# Patient Record
Sex: MALE | Race: OTHER | Employment: FULL TIME
[De-identification: names, ages, dates, MRNs, and addresses within clinical notes are randomized per-mention and may not be internally consistent; named-entity substitution may affect disease eponyms.]

---

## 2017-02-07 ENCOUNTER — OFFICE VISIT (OUTPATIENT)
Dept: FAMILY MEDICINE CLINIC | Facility: CLINIC | Age: 37
End: 2017-02-07

## 2017-02-07 ENCOUNTER — HOSPITAL ENCOUNTER (OUTPATIENT)
Age: 37
Discharge: HOME | End: 2017-02-07
Payer: COMMERCIAL

## 2017-02-07 VITALS
HEART RATE: 68 BPM | BODY MASS INDEX: 29.77 KG/M2 | SYSTOLIC BLOOD PRESSURE: 128 MMHG | HEIGHT: 69 IN | DIASTOLIC BLOOD PRESSURE: 66 MMHG | TEMPERATURE: 97.9 F | WEIGHT: 201 LBS

## 2017-02-07 DIAGNOSIS — Z00.00 ANNUAL PHYSICAL EXAM: Primary | ICD-10-CM

## 2017-02-07 DIAGNOSIS — Z00.00 ANNUAL PHYSICAL EXAM: ICD-10-CM

## 2017-02-07 LAB
CHOLESTEROL/HDL RATIO: 3.6
CHOLESTEROL: 197 MG/DL
GLUCOSE BLD-MCNC: 104 MG/DL (ref 70–99)
HDLC SERPL-MCNC: 54 MG/DL
LDL CHOLESTEROL: 120 MG/DL (ref 0–130)
TRIGL SERPL-MCNC: 115 MG/DL
VLDLC SERPL CALC-MCNC: NORMAL MG/DL (ref 1–30)

## 2017-02-07 PROCEDURE — 99395 PREV VISIT EST AGE 18-39: CPT | Performed by: FAMILY MEDICINE

## 2017-02-07 PROCEDURE — 82947 ASSAY GLUCOSE BLOOD QUANT: CPT

## 2017-02-07 PROCEDURE — 80061 LIPID PANEL: CPT

## 2017-02-07 PROCEDURE — 36415 COLL VENOUS BLD VENIPUNCTURE: CPT

## 2017-02-07 ASSESSMENT — PATIENT HEALTH QUESTIONNAIRE - PHQ9
1. LITTLE INTEREST OR PLEASURE IN DOING THINGS: 0
SUM OF ALL RESPONSES TO PHQ QUESTIONS 1-9: 0
SUM OF ALL RESPONSES TO PHQ9 QUESTIONS 1 & 2: 0
2. FEELING DOWN, DEPRESSED OR HOPELESS: 0

## 2017-02-07 ASSESSMENT — ENCOUNTER SYMPTOMS
CONSTIPATION: 1
NAUSEA: 0
SINUS PRESSURE: 0
SORE THROAT: 0
SHORTNESS OF BREATH: 0
VOMITING: 0
ABDOMINAL PAIN: 0

## 2017-10-20 ENCOUNTER — OFFICE VISIT (OUTPATIENT)
Dept: FAMILY MEDICINE CLINIC | Age: 37
End: 2017-10-20
Payer: COMMERCIAL

## 2017-10-20 VITALS
TEMPERATURE: 97.5 F | BODY MASS INDEX: 31.58 KG/M2 | HEIGHT: 68 IN | WEIGHT: 208.4 LBS | SYSTOLIC BLOOD PRESSURE: 124 MMHG | HEART RATE: 97 BPM | OXYGEN SATURATION: 97 % | DIASTOLIC BLOOD PRESSURE: 72 MMHG

## 2017-10-20 DIAGNOSIS — R20.0 NUMBNESS AND TINGLING IN LEFT HAND: ICD-10-CM

## 2017-10-20 DIAGNOSIS — M25.512 LEFT SHOULDER PAIN, UNSPECIFIED CHRONICITY: Primary | ICD-10-CM

## 2017-10-20 DIAGNOSIS — R20.2 NUMBNESS AND TINGLING IN LEFT HAND: ICD-10-CM

## 2017-10-20 PROCEDURE — 99213 OFFICE O/P EST LOW 20 MIN: CPT | Performed by: FAMILY MEDICINE

## 2017-10-20 ASSESSMENT — ENCOUNTER SYMPTOMS
SHORTNESS OF BREATH: 0
COUGH: 0
BACK PAIN: 0
NAUSEA: 0
ABDOMINAL PAIN: 0
DIARRHEA: 0
CONSTIPATION: 0
SORE THROAT: 0

## 2017-10-20 NOTE — PROGRESS NOTES
Subjective:      Patient ID: Maria Victoria Mart is a 44 y.o. male. Visit Information    Have you changed or started any medications since your last visit including any over-the-counter medicines, vitamins, or herbal medicines? no   Are you having any side effects from any of your medications? -  no  Have you stopped taking any of your medications? Is so, why? -  no    Have you seen any other physician or provider since your last visit? No   Have you had any other diagnostic tests since your last visit? Yes - Records Requested  Have you been seen in the emergency room and/or had an admission to a hospital since we last saw you? No  Have you had your routine dental cleaning in the past 6 months? no    Have you activated your [employer] account? If not, what are your barriers? Yes     Patient Care Team:  Barbara Alcocer MD as PCP - General (Family Medicine)    Medical History Review  Past Medical, Family, and Social History reviewed and does not contribute to the patient presenting condition    Health Maintenance   Topic Date Due    HIV screen  07/26/1994    DTaP/Tdap/Td vaccine (1 - Tdap) 07/26/1998    Flu vaccine (1) 09/01/2017     HPI   This 38-year -old male is seen in the Office. today complaining of left shoulder pain off and on   went to the urgent care and they gave him some pain medication he states that it is feeling better the pain comes and goes. He also complains of some numbness and tingling in both of his hand and mostly in his left hand he states he is a . At work it bothers him some time he has pain in his fingers has been using some patches on his shoulder and he states that the pain is much better  Review of Systems   Constitutional: Negative for appetite change. HENT: Negative for ear pain and sore throat. Eyes: Negative for visual disturbance. Respiratory: Negative for cough and shortness of breath. Cardiovascular: Negative for chest pain.    Gastrointestinal: Negative for abdominal pain, constipation, diarrhea and nausea. Genitourinary: Negative for frequency and urgency. Musculoskeletal: Positive for arthralgias. Negative for back pain. Neurological: Positive for numbness. Negative for dizziness and headaches. Objective:   Physical Exam   Constitutional: He is oriented to person, place, and time. He appears well-developed and well-nourished. /72   Pulse 97   Temp 97.5 °F (36.4 °C) (Oral)   Ht 5' 8\" (1.727 m)   Wt 208 lb 6.4 oz (94.5 kg)   SpO2 97%   BMI 31.69 kg/m²    HENT:   Head: Normocephalic. Mouth/Throat: Oropharynx is clear and moist.   Cardiovascular: Normal rate and regular rhythm. Pulmonary/Chest: Breath sounds normal. He has no rales. Abdominal: Soft. There is no tenderness. Musculoskeletal: He exhibits no edema or tenderness. No tenderness over the left shoulder present no restricted movement of the left arm   Lymphadenopathy:     He has no cervical adenopathy. Neurological: He is alert and oriented to person, place, and time. Tinel test is positive in left wrist   Nursing note and vitals reviewed. Assessment:      1. Left shoulder pain, unspecified chronicity  Analgesic    2. Numbness and tingling in left hand            Plan:        No orders of the defined types were placed in this encounter. No orders of the defined types were placed in this encounter. Return if symptoms worsen or fail to improve.     Discussed with the patient that if the pain gets worse in the shoulder he needs to come and see me if numbness in his hands get worse then we can run some tests    Wear a wrist splint on his left hand

## 2018-05-22 ENCOUNTER — OFFICE VISIT (OUTPATIENT)
Dept: FAMILY MEDICINE CLINIC | Age: 38
End: 2018-05-22
Payer: COMMERCIAL

## 2018-05-22 VITALS
OXYGEN SATURATION: 98 % | TEMPERATURE: 98 F | HEART RATE: 82 BPM | DIASTOLIC BLOOD PRESSURE: 80 MMHG | SYSTOLIC BLOOD PRESSURE: 120 MMHG | WEIGHT: 198 LBS | BODY MASS INDEX: 30.11 KG/M2

## 2018-05-22 DIAGNOSIS — K59.00 CONSTIPATION, UNSPECIFIED CONSTIPATION TYPE: Primary | ICD-10-CM

## 2018-05-22 PROCEDURE — 99213 OFFICE O/P EST LOW 20 MIN: CPT | Performed by: FAMILY MEDICINE

## 2018-05-22 RX ORDER — KETOCONAZOLE 20 MG/G
CREAM TOPICAL
Qty: 30 G | Refills: 1 | Status: CANCELLED | OUTPATIENT
Start: 2018-05-22

## 2018-05-22 RX ORDER — POLYETHYLENE GLYCOL 3350 17 G/17G
17 POWDER, FOR SOLUTION ORAL DAILY
Qty: 510 G | Refills: 0 | Status: SHIPPED | OUTPATIENT
Start: 2018-05-22 | End: 2019-01-21 | Stop reason: SDUPTHER

## 2018-05-22 RX ORDER — KETOCONAZOLE 20 MG/G
CREAM TOPICAL
COMMUNITY
Start: 2018-05-08 | End: 2018-05-22

## 2018-05-22 ASSESSMENT — ENCOUNTER SYMPTOMS
SHORTNESS OF BREATH: 0
NAUSEA: 0
ABDOMINAL PAIN: 0
VOMITING: 0
SORE THROAT: 0
CONSTIPATION: 1

## 2018-05-22 ASSESSMENT — PATIENT HEALTH QUESTIONNAIRE - PHQ9
2. FEELING DOWN, DEPRESSED OR HOPELESS: 0
SUM OF ALL RESPONSES TO PHQ9 QUESTIONS 1 & 2: 0
SUM OF ALL RESPONSES TO PHQ QUESTIONS 1-9: 0
1. LITTLE INTEREST OR PLEASURE IN DOING THINGS: 0

## 2018-07-16 ENCOUNTER — OFFICE VISIT (OUTPATIENT)
Dept: FAMILY MEDICINE CLINIC | Age: 38
End: 2018-07-16
Payer: COMMERCIAL

## 2018-07-16 VITALS
WEIGHT: 194.4 LBS | HEART RATE: 82 BPM | BODY MASS INDEX: 28.79 KG/M2 | TEMPERATURE: 97.9 F | DIASTOLIC BLOOD PRESSURE: 78 MMHG | HEIGHT: 69 IN | SYSTOLIC BLOOD PRESSURE: 134 MMHG | OXYGEN SATURATION: 96 %

## 2018-07-16 DIAGNOSIS — Z23 NEED FOR DIPHTHERIA-TETANUS-PERTUSSIS (TDAP) VACCINE, ADULT/ADOLESCENT: ICD-10-CM

## 2018-07-16 DIAGNOSIS — B49 FUNGAL INFECTION: Primary | ICD-10-CM

## 2018-07-16 DIAGNOSIS — Z00.00 PREVENTATIVE HEALTH CARE: ICD-10-CM

## 2018-07-16 DIAGNOSIS — R53.83 FATIGUE, UNSPECIFIED TYPE: ICD-10-CM

## 2018-07-16 DIAGNOSIS — Z83.3 FAMILY HISTORY OF DIABETES MELLITUS: ICD-10-CM

## 2018-07-16 DIAGNOSIS — K59.00 CONSTIPATION, UNSPECIFIED CONSTIPATION TYPE: ICD-10-CM

## 2018-07-16 PROCEDURE — 90471 IMMUNIZATION ADMIN: CPT | Performed by: NURSE PRACTITIONER

## 2018-07-16 PROCEDURE — 90715 TDAP VACCINE 7 YRS/> IM: CPT | Performed by: NURSE PRACTITIONER

## 2018-07-16 PROCEDURE — 99204 OFFICE O/P NEW MOD 45 MIN: CPT | Performed by: NURSE PRACTITIONER

## 2018-07-16 RX ORDER — NYSTATIN 100000 [USP'U]/G
POWDER TOPICAL
Qty: 1 BOTTLE | Refills: 5 | Status: SHIPPED | OUTPATIENT
Start: 2018-07-16 | End: 2021-02-08

## 2018-07-16 ASSESSMENT — ENCOUNTER SYMPTOMS
ABDOMINAL DISTENTION: 1
CONSTIPATION: 1
SHORTNESS OF BREATH: 0
ABDOMINAL PAIN: 0
COUGH: 0
EYE DISCHARGE: 0
BLOOD IN STOOL: 0

## 2018-07-16 NOTE — PROGRESS NOTES
Value Date     05/23/2007    K 3.6 05/23/2007     05/23/2007    CO2 26 05/23/2007    BUN 23 05/23/2007    CREATININE 1.1 05/23/2007    GLUCOSE 104 02/07/2017    CALCIUM 9.6 05/23/2007     No results found for: LABA1C        Assessment:       Diagnosis Orders   1. Fungal infection  nystatin (MYCOSTATIN) 636770 UNIT/GM powder   2. Constipation, unspecified constipation type     3. Fatigue, unspecified type  CBC    TSH with Reflex   4. Preventative health care  Comprehensive Metabolic Panel    Lipid Panel    HIV Screen   5. Need for diphtheria-tetanus-pertussis (Tdap) vaccine, adult/adolescent  Tdap (age 10y-63y) IM (Adacel)   10. Family history of diabetes mellitus  Hemoglobin A1C       Plan:       Diagnosis Orders   1. Fungal infection  nystatin (MYCOSTATIN) 687801 UNIT/GM powder   2. Constipation, unspecified constipation type     3. Fatigue, unspecified type  CBC    TSH with Reflex   4. Preventative health care  Comprehensive Metabolic Panel    Lipid Panel    HIV Screen   5. Need for diphtheria-tetanus-pertussis (Tdap) vaccine, adult/adolescent  Tdap (age 10y-63y) IM (Adacel)   10. Family history of diabetes mellitus  Hemoglobin A1C         Use cream ,use powder to absorb sweat, use mild soap on genitalia. Patient verbalizes understanding. Florencia Elizabeth received counseling on the following healthy behaviors: medication adherence  Reviewed prior labs and health maintenance  Continue current medications, diet and exercise. Discussed use, benefit, and side effects of prescribed medications. Barriers to medication compliance addressed. Patient given educational materials - see patient instructions  Was a self-tracking handout given in paper form or via [employer]? Yes    Requested Prescriptions     Signed Prescriptions Disp Refills    nystatin (MYCOSTATIN) 186645 UNIT/GM powder 1 Bottle 5     Sig: Apply 3 times daily. All patient questions answered. Patient voiced understanding.     Quality Measures    Body mass index is 28.71 kg/m². Elevated. Weight control planned discussed Healthy diet and regular exercise. BP: 134/78 Blood pressure is normal. Treatment plan consists of No treatment change needed. Lab Results   Component Value Date    LDLCHOLESTEROL 120 02/07/2017    (goal LDL reduction with dx if diabetes is 50% LDL reduction)      PHQ Scores 5/22/2018 2/7/2017 11/30/2015   PHQ2 Score 0 0 0   PHQ9 Score 0 0 0     Interpretation of Total Score Depression Severity: 1-4 = Minimal depression, 5-9 = Mild depression, 10-14 = Moderate depression, 15-19 = Moderately severe depression, 20-27 = Severe depression    Return in about 4 weeks (around 8/13/2018). Orders Placed This Encounter   Procedures    Tdap (age 10y-63y) IM (Adacel)    CBC     Standing Status:   Future     Standing Expiration Date:   7/16/2019    Comprehensive Metabolic Panel     Standing Status:   Future     Standing Expiration Date:   7/16/2019    Lipid Panel     Standing Status:   Future     Standing Expiration Date:   7/16/2019     Order Specific Question:   Is Patient Fasting?/# of Hours     Answer:   12    TSH with Reflex     Standing Status:   Future     Standing Expiration Date:   7/17/2019    Hemoglobin A1C     Standing Status:   Future     Standing Expiration Date:   7/16/2019    HIV Screen     Standing Status:   Future     Standing Expiration Date:   7/16/2019     Orders Placed This Encounter   Medications    nystatin (MYCOSTATIN) 365251 UNIT/GM powder     Sig: Apply 3 times daily. Dispense:  1 Bottle     Refill:  5     56.7 g       Patient given verbal and/or written educational instructions. Follow up as directed. I have reviewed and agree with the nursing documentation.     Electronically signed by PEPE Connolly CNP on 7/16/2018 at 9:43 AM

## 2018-07-21 ENCOUNTER — HOSPITAL ENCOUNTER (OUTPATIENT)
Age: 38
Discharge: HOME | End: 2018-07-21
Payer: COMMERCIAL

## 2018-07-21 DIAGNOSIS — Z83.3 FAMILY HISTORY OF DIABETES MELLITUS: ICD-10-CM

## 2018-07-21 DIAGNOSIS — R53.83 FATIGUE, UNSPECIFIED TYPE: ICD-10-CM

## 2018-07-21 DIAGNOSIS — Z00.00 PREVENTATIVE HEALTH CARE: ICD-10-CM

## 2018-07-21 LAB
ALBUMIN SERPL-MCNC: 4.4 G/DL (ref 3.5–5.2)
ALBUMIN/GLOBULIN RATIO: ABNORMAL (ref 1–2.5)
ALP BLD-CCNC: 67 U/L (ref 40–129)
ALT SERPL-CCNC: 18 U/L (ref 5–41)
ANION GAP SERPL CALCULATED.3IONS-SCNC: 12 MMOL/L (ref 9–17)
AST SERPL-CCNC: 18 U/L
BILIRUB SERPL-MCNC: 0.42 MG/DL (ref 0.3–1.2)
BUN BLDV-MCNC: 15 MG/DL (ref 6–20)
BUN/CREAT BLD: ABNORMAL (ref 9–20)
CALCIUM SERPL-MCNC: 9.1 MG/DL (ref 8.6–10.4)
CHLORIDE BLD-SCNC: 104 MMOL/L (ref 98–107)
CHOLESTEROL/HDL RATIO: 3.2
CHOLESTEROL: 165 MG/DL
CO2: 28 MMOL/L (ref 20–31)
CREAT SERPL-MCNC: 0.74 MG/DL (ref 0.7–1.2)
ESTIMATED AVERAGE GLUCOSE: 105 MG/DL
GFR AFRICAN AMERICAN: >60 ML/MIN
GFR NON-AFRICAN AMERICAN: >60 ML/MIN
GFR SERPL CREATININE-BSD FRML MDRD: ABNORMAL ML/MIN/{1.73_M2}
GFR SERPL CREATININE-BSD FRML MDRD: ABNORMAL ML/MIN/{1.73_M2}
GLUCOSE BLD-MCNC: 114 MG/DL (ref 70–99)
HBA1C MFR BLD: 5.3 % (ref 4–6)
HCT VFR BLD CALC: 44 % (ref 41–53)
HDLC SERPL-MCNC: 52 MG/DL
HEMOGLOBIN: 15.1 G/DL (ref 13.5–17.5)
HIV AG/AB: NONREACTIVE
LDL CHOLESTEROL: 103 MG/DL (ref 0–130)
MCH RBC QN AUTO: 30.8 PG (ref 26–34)
MCHC RBC AUTO-ENTMCNC: 34.4 G/DL (ref 31–37)
MCV RBC AUTO: 89.7 FL (ref 80–100)
NRBC AUTOMATED: NORMAL PER 100 WBC
PDW BLD-RTO: 13.3 % (ref 11.5–14.9)
PLATELET # BLD: 281 K/UL (ref 150–450)
PMV BLD AUTO: 8.6 FL (ref 6–12)
POTASSIUM SERPL-SCNC: 4.6 MMOL/L (ref 3.7–5.3)
RBC # BLD: 4.91 M/UL (ref 4.5–5.9)
SODIUM BLD-SCNC: 144 MMOL/L (ref 135–144)
TOTAL PROTEIN: 7.6 G/DL (ref 6.4–8.3)
TRIGL SERPL-MCNC: 48 MG/DL
TSH SERPL DL<=0.05 MIU/L-ACNC: 0.67 MIU/L (ref 0.3–5)
VLDLC SERPL CALC-MCNC: NORMAL MG/DL (ref 1–30)
WBC # BLD: 5.4 K/UL (ref 3.5–11)

## 2018-07-21 PROCEDURE — 84443 ASSAY THYROID STIM HORMONE: CPT

## 2018-07-21 PROCEDURE — 85027 COMPLETE CBC AUTOMATED: CPT

## 2018-07-21 PROCEDURE — 80061 LIPID PANEL: CPT

## 2018-07-21 PROCEDURE — 83036 HEMOGLOBIN GLYCOSYLATED A1C: CPT

## 2018-07-21 PROCEDURE — 36415 COLL VENOUS BLD VENIPUNCTURE: CPT

## 2018-07-21 PROCEDURE — 80053 COMPREHEN METABOLIC PANEL: CPT

## 2018-07-21 PROCEDURE — 87389 HIV-1 AG W/HIV-1&-2 AB AG IA: CPT

## 2018-07-31 ENCOUNTER — OFFICE VISIT (OUTPATIENT)
Dept: UROLOGY | Age: 38
End: 2018-07-31
Payer: COMMERCIAL

## 2018-07-31 VITALS
HEIGHT: 69 IN | HEART RATE: 65 BPM | BODY MASS INDEX: 28.88 KG/M2 | DIASTOLIC BLOOD PRESSURE: 88 MMHG | SYSTOLIC BLOOD PRESSURE: 131 MMHG | WEIGHT: 195 LBS | RESPIRATION RATE: 16 BRPM

## 2018-07-31 DIAGNOSIS — N48.1 BALANITIS: Primary | ICD-10-CM

## 2018-07-31 PROCEDURE — 99204 OFFICE O/P NEW MOD 45 MIN: CPT | Performed by: UROLOGY

## 2018-07-31 RX ORDER — CLOTRIMAZOLE AND BETAMETHASONE DIPROPIONATE 10; .64 MG/G; MG/G
CREAM TOPICAL
Qty: 45 G | Refills: 0 | Status: SHIPPED | OUTPATIENT
Start: 2018-07-31 | End: 2018-09-18 | Stop reason: SDUPTHER

## 2018-07-31 ASSESSMENT — ENCOUNTER SYMPTOMS
NAUSEA: 0
ABDOMINAL PAIN: 0
VOMITING: 0
COUGH: 0
COLOR CHANGE: 0
BACK PAIN: 1
WHEEZING: 0
EYE REDNESS: 0
EYE PAIN: 0
SHORTNESS OF BREATH: 0

## 2018-07-31 NOTE — PROGRESS NOTES
[facility]  [facility] - [place]  [address]  [address]  [address] [facility] [address]  Dept: [phone]  Dept Fax: [phone] [facility] Urology Office Note - New patient    Patient:  Neville Mcguire  YOB: 1980  Date: 7/31/2018    The patient is a 43 y.o. male who presents today for evaluation of the following problems:   Chief Complaint   Patient presents with    New Patient     fungal infection    referred by PEPE Gant CNP. HPI  Here for fungal infection he has noted on his penis. His PCP has given in Ketoconazole with some improvement. His foreskin is tight. Gets athletes feet. (Patient's old records have been requested, reviewed and summarized in today's note.)    Summary of old records: N/A    Additional History: N/A    Procedures Today: N/A    Last several PSA's:  No results found for: PSA  Last total testosterone:  No results found for: TESTOSTERONE  Urinalysis today:  No results found for this visit on 07/31/18. AUA Symptom Score (7/31/2018):   INCOMPLETE EMPTYING: How often have you had the sensation of not emptying your bladder?: Less than Half the time  FREQUENCY: How often do you have to urinate less than every two hours?: Not at all  INTERMITTENCY: How often have you found you stopped and started again several times when you urinated?: Not at all  URGENCY: How often have you found it difficult to postpone urination?: Not at all  WEAK STREAM: How often have you had a weak urinary stream?: Not at all  STRAINING: How often have you had to strain to start  urination?: Not at all  NOCTURIA: How many times did you typically get up at night to uriniate?: NONE  TOTAL I-PSS SCORE[de-identified] 2  How would you feel if you were to spend the rest of your life with your urinary condition?: Delighted    Last BUN and creatinine:  Lab Results   Component Value Date    BUN 15 07/21/2018     Lab Results   Component Value Date    CREATININE 0.74 07/21/2018 Additional Lab/Culture results:     Imaging Reviewed during this Office Visit:   (results were independently reviewed by physician and radiology report verified)    PAST MEDICAL, FAMILY AND SOCIAL HISTORY:  Past Medical History:   Diagnosis Date    GERD (gastroesophageal reflux disease)     Irritable bowel syndrome      Past Surgical History:   Procedure Laterality Date    COLONOSCOPY      UPPER GASTROINTESTINAL ENDOSCOPY  08/10/2009     Family History   Problem Relation Age of Onset    Hypertension Mother     Diabetes Father     Cancer Father         stomach death age 71     Outpatient Prescriptions Marked as Taking for the 7/31/18 encounter (Office Visit) with Mc Ingram MD   Medication Sig Dispense Refill    clotrimazole-betamethasone (LOTRISONE) 1-0.05 % cream Apply topically 2 times daily to clean dry skin. Stop cream once skin is les sinflamed. 45 g 0    nystatin (MYCOSTATIN) 459604 UNIT/GM powder Apply 3 times daily. 1 Bottle 5       Patient has no known allergies. History   Smoking Status    Never Smoker   Smokeless Tobacco    Never Used      (If patient a smoker, smoking cessation counseling offered)   History   Alcohol Use    0.0 oz/week     Comment: occasionally       REVIEW OF SYSTEMS:  Review of Systems    Physical Exam:    This a 43 y.o. male   Vitals:    07/31/18 1537   BP: 131/88   Pulse: 65   Resp: 16     Body mass index is 28.8 kg/m². Physical Exam  Constitutional: Patient in no acute distress. Neuro: Alert and oriented to person, place and time. Psych: Mood normal, affect normal  Skin: No rash noted  HEENT: Head: Normocephalic and atraumatic  Conjunctivae and EOM are normal. Pupils are equal, round  Nose: Normal  Right External Ear: Normal; Left External Ear: Normal  Mouth: Mucosa Moist  Neck: Supple  Lungs: Respiratory effort is normal  Cardiovascular: strong and regular. Abdomen: Soft, non-tender, non-distended  Bladder non-tender and not distended.   Musculoskeletal:

## 2018-08-13 ENCOUNTER — OFFICE VISIT (OUTPATIENT)
Dept: FAMILY MEDICINE CLINIC | Age: 38
End: 2018-08-13
Payer: COMMERCIAL

## 2018-08-13 ENCOUNTER — HOSPITAL ENCOUNTER (OUTPATIENT)
Age: 38
Discharge: HOME | End: 2018-08-13
Payer: COMMERCIAL

## 2018-08-13 VITALS
WEIGHT: 192.4 LBS | DIASTOLIC BLOOD PRESSURE: 82 MMHG | SYSTOLIC BLOOD PRESSURE: 116 MMHG | OXYGEN SATURATION: 97 % | BODY MASS INDEX: 27.54 KG/M2 | HEART RATE: 77 BPM | HEIGHT: 70 IN

## 2018-08-13 DIAGNOSIS — T14.8XXA BRUISING: ICD-10-CM

## 2018-08-13 DIAGNOSIS — R53.83 FATIGUE, UNSPECIFIED TYPE: ICD-10-CM

## 2018-08-13 DIAGNOSIS — L23.7 POISON IVY DERMATITIS: Primary | ICD-10-CM

## 2018-08-13 DIAGNOSIS — N48.1 BALANITIS: ICD-10-CM

## 2018-08-13 DIAGNOSIS — K59.00 CONSTIPATION, UNSPECIFIED CONSTIPATION TYPE: ICD-10-CM

## 2018-08-13 LAB — VITAMIN D 25-HYDROXY: 29.6 NG/ML (ref 30–100)

## 2018-08-13 PROCEDURE — 99214 OFFICE O/P EST MOD 30 MIN: CPT | Performed by: NURSE PRACTITIONER

## 2018-08-13 PROCEDURE — 36415 COLL VENOUS BLD VENIPUNCTURE: CPT

## 2018-08-13 PROCEDURE — 82306 VITAMIN D 25 HYDROXY: CPT

## 2018-08-13 RX ORDER — PREDNISONE 20 MG/1
20 TABLET ORAL 2 TIMES DAILY
Qty: 10 TABLET | Refills: 0 | Status: SHIPPED | OUTPATIENT
Start: 2018-08-13 | End: 2018-08-23

## 2018-08-13 ASSESSMENT — ENCOUNTER SYMPTOMS
SHORTNESS OF BREATH: 0
CONSTIPATION: 1
ABDOMINAL DISTENTION: 0

## 2018-08-13 NOTE — PROGRESS NOTES
[address] [facility]  [address]  [address] [facility] [address]  Dept: [phone]  Dept Fax: [phone]    Dong Pacheco is a 43 y.o. male who presents today for his medical conditions/complaints as noted below. Dong Pacheco is c/o of Nail Problem and Poison Ivy (still on hip)        HPI:     Patient presents with:  Saw URO for rash on penis, getting better. Poison Ivy: still on hip, remaining on hip, cleared on R elebow. occas constipation  Had colonoscopy   Was wnl         Past Medical History:   Diagnosis Date    GERD (gastroesophageal reflux disease)     Irritable bowel syndrome       Past Surgical History:   Procedure Laterality Date    COLONOSCOPY      UPPER GASTROINTESTINAL ENDOSCOPY  08/10/2009       Family History   Problem Relation Age of Onset    Hypertension Mother     Diabetes Father     Cancer Father         colon  death age 71       Social History   Substance Use Topics    Smoking status: Never Smoker    Smokeless tobacco: Never Used    Alcohol use 0.0 oz/week      Comment: occasionally      Current Outpatient Prescriptions   Medication Sig Dispense Refill    predniSONE (DELTASONE) 20 MG tablet Take 1 tablet by mouth 2 times daily for 10 days 10 tablet 0    clotrimazole-betamethasone (LOTRISONE) 1-0.05 % cream Apply topically 2 times daily to clean dry skin. Stop cream once skin is les sinflamed. 45 g 0    nystatin (MYCOSTATIN) 060638 UNIT/GM powder Apply 3 times daily. 1 Bottle 5     No current facility-administered medications for this visit. No Known Allergies      Subjective:      Review of Systems   Constitutional: Positive for fatigue. No night sweats, no wt loss    HENT: Negative for congestion. Respiratory: Negative for shortness of breath. Cardiovascular: Negative for chest pain. Gastrointestinal: Positive for constipation (occas ). Negative for abdominal distention.    Genitourinary: Negative for by PEPE Morillo CNP on 8/13/2018 at 4:20 PM

## 2018-08-14 LAB — PATHOLOGIST REVIEW: NORMAL

## 2018-09-18 ENCOUNTER — OFFICE VISIT (OUTPATIENT)
Dept: UROLOGY | Age: 38
End: 2018-09-18
Payer: COMMERCIAL

## 2018-09-18 VITALS
BODY MASS INDEX: 26.22 KG/M2 | HEIGHT: 69 IN | WEIGHT: 177 LBS | DIASTOLIC BLOOD PRESSURE: 74 MMHG | SYSTOLIC BLOOD PRESSURE: 120 MMHG | TEMPERATURE: 97.7 F | HEART RATE: 82 BPM

## 2018-09-18 DIAGNOSIS — N48.1 BALANITIS: Primary | ICD-10-CM

## 2018-09-18 PROCEDURE — 99213 OFFICE O/P EST LOW 20 MIN: CPT | Performed by: UROLOGY

## 2018-09-18 RX ORDER — CLOTRIMAZOLE AND BETAMETHASONE DIPROPIONATE 10; .64 MG/G; MG/G
CREAM TOPICAL
Qty: 45 G | Refills: 0 | Status: SHIPPED | OUTPATIENT
Start: 2018-09-18 | End: 2019-01-21 | Stop reason: SDUPTHER

## 2018-09-18 ASSESSMENT — ENCOUNTER SYMPTOMS
COUGH: 0
VOMITING: 0
BACK PAIN: 1
COLOR CHANGE: 0
SHORTNESS OF BREATH: 0
ABDOMINAL PAIN: 0
EYE REDNESS: 0
EYE PAIN: 0
WHEEZING: 0
NAUSEA: 0

## 2018-09-18 NOTE — PROGRESS NOTES
[facility]  [facility] [address]  [address]  [address] [facility] [address]  Dept: [phone] [facility] Urology Office Note - Established    Patient:  Jerome Borrero  YOB: 1980  Date: 9/18/2018    The patient is a 43 y.o. male who presents today for evaluation of the following problems:   Chief Complaint   Patient presents with    1 Month Follow-Up     7 week F/U for Balanitis       HPI  Doing better with ketoconoloze, improving, no pain, no urinary isues. Stream good,     Summary of old records: N/A    Additional History: N/A    Procedures Today: N/A    Urinalysis today:  No results found for this visit on 09/18/18. Last several PSA's:  No results found for: PSA  Last total testosterone:  No results found for: TESTOSTERONE    AUA Symptom Score (9/18/2018):   INCOMPLETE EMPTYING: How often have you had the sensation of not emptying your bladder?: Not at all  FREQUENCY: How often do you have to urinate less than every two hours?: Less than 1 to 5 times  INTERMITTENCY: How often have you found you stopped and started again several times when you urinated?: Not at all  URGENCY: How often have you found it difficult to postpone urination?: Not at all  WEAK STREAM: How often have you had a weak urinary stream?: Not at all  STRAINING: How often have you had to strain to start  urination?: Not at all  NOCTURIA: How many times did you typically get up at night to uriniate?: NONE  TOTAL I-PSS SCORE[de-identified] 1  How would you feel if you were to spend the rest of your life with your urinary condition?: Mostly Satisfied    Last BUN and creatinine:  Lab Results   Component Value Date    BUN 15 07/21/2018     Lab Results   Component Value Date    CREATININE 0.74 07/21/2018       Additional Lab/Culture results: none    Imaging Reviewed during this Office Visit: none  (results were independently reviewed by physician and radiology report verified)    PAST MEDICAL, [address] [place] [address] HISTORY UPDATE:  Past Medical History:   Diagnosis Date    GERD (gastroesophageal reflux disease)     Irritable bowel syndrome      Past Surgical History:   Procedure Laterality Date    COLONOSCOPY      UPPER GASTROINTESTINAL ENDOSCOPY  08/10/2009     Family History   Problem Relation Age of Onset    Hypertension Mother     Diabetes Father     Cancer Father         colon  death age 71     No outpatient prescriptions have been marked as taking for the 9/18/18 encounter (Office Visit) with Freddy Stephens MD.       Patient has no known allergies. History   Smoking Status    Never Smoker   Smokeless Tobacco    Never Used     (If patient a smoker, smoking cessation counseling offered)    History   Alcohol Use    0.0 oz/week     Comment: occasionally       REVIEW OF SYSTEMS:  Review of Systems    Physical Exam:      Vitals:    09/18/18 1605   BP: 120/74   Pulse: 82   Temp: 97.7 °F (36.5 °C)     Body mass index is 26.14 kg/m². Patient is a 43 y.o. male in no acute distress and alert and oriented to person, place and time. Physical Exam  Constitutional: Patient in no acute distress. Neuro: Alert and oriented to person, place and time. Psych: Mood normal, affect normal  Skin: No rash noted  HEENT: Head: Normocephalic and atraumatic  Conjunctivae and EOM are normal. Pupils are equal, round  Nose: Normal  Right External Ear: Normal; Left External Ear: Normal  Mouth: Mucosa Moist  Neck: Supple  Lungs: Respiratory effort is normal  Cardiovascular: Warm & Pink  Abdomen: Soft, non-tender, non-distended with no CVA,  No flank tenderness,  Or hepatosplenomegaly       Assessment and Plan      1. Balanitis           Plan:    cont cream for now  Consider circ in future  Return in about 3 months (around 12/18/2018) for Follow up. Prescriptions Ordered:  No orders of the defined types were placed in this encounter. Orders Placed:  No orders of the defined types were placed in this encounter.          Freddy Stephens MD    Agree with the ROS entered by the MA.

## 2018-09-18 NOTE — PROGRESS NOTES
Review of Systems   Constitutional: Negative for appetite change, chills and fever. Eyes: Negative for pain, redness and visual disturbance. Respiratory: Negative for cough, shortness of breath and wheezing. Cardiovascular: Negative for chest pain and leg swelling. Gastrointestinal: Negative for abdominal pain, nausea and vomiting. Genitourinary: Negative for difficulty urinating, discharge, dysuria, flank pain, frequency, hematuria, scrotal swelling and testicular pain. Musculoskeletal: Positive for back pain. Negative for joint swelling and myalgias. Skin: Negative for color change, rash and wound. Neurological: Negative for dizziness, tremors and numbness. Hematological: Negative for adenopathy. Does not bruise/bleed easily.

## 2018-12-18 ENCOUNTER — OFFICE VISIT (OUTPATIENT)
Dept: UROLOGY | Age: 38
End: 2018-12-18
Payer: COMMERCIAL

## 2018-12-18 VITALS
TEMPERATURE: 97.8 F | BODY MASS INDEX: 30.07 KG/M2 | SYSTOLIC BLOOD PRESSURE: 132 MMHG | HEART RATE: 78 BPM | HEIGHT: 69 IN | DIASTOLIC BLOOD PRESSURE: 97 MMHG | WEIGHT: 203 LBS

## 2018-12-18 DIAGNOSIS — N47.1 PHIMOSIS: ICD-10-CM

## 2018-12-18 DIAGNOSIS — N48.1 BALANITIS: Primary | ICD-10-CM

## 2018-12-18 PROCEDURE — 99213 OFFICE O/P EST LOW 20 MIN: CPT | Performed by: UROLOGY

## 2018-12-18 ASSESSMENT — ENCOUNTER SYMPTOMS
ABDOMINAL PAIN: 0
EYE PAIN: 0
BACK PAIN: 0
VOMITING: 0
NAUSEA: 0
COUGH: 0
SHORTNESS OF BREATH: 0
COLOR CHANGE: 0
EYE REDNESS: 0
WHEEZING: 0

## 2018-12-18 NOTE — PROGRESS NOTES
up.    Prescriptions Ordered:  No orders of the defined types were placed in this encounter. Orders Placed:  No orders of the defined types were placed in this encounter. Babita Dc MD    Agree with the ROS entered by the MA.

## 2019-01-21 ENCOUNTER — OFFICE VISIT (OUTPATIENT)
Dept: FAMILY MEDICINE CLINIC | Age: 39
End: 2019-01-21
Payer: COMMERCIAL

## 2019-01-21 VITALS
HEART RATE: 87 BPM | HEIGHT: 69 IN | SYSTOLIC BLOOD PRESSURE: 125 MMHG | OXYGEN SATURATION: 96 % | DIASTOLIC BLOOD PRESSURE: 77 MMHG | WEIGHT: 203.6 LBS | BODY MASS INDEX: 30.16 KG/M2

## 2019-01-21 DIAGNOSIS — E66.9 OBESITY, CLASS I, BMI 30-34.9: ICD-10-CM

## 2019-01-21 DIAGNOSIS — K59.00 CONSTIPATION, UNSPECIFIED CONSTIPATION TYPE: ICD-10-CM

## 2019-01-21 DIAGNOSIS — Z00.01 ENCOUNTER FOR GENERAL ADULT MEDICAL EXAMINATION WITH ABNORMAL FINDINGS: Primary | ICD-10-CM

## 2019-01-21 DIAGNOSIS — B37.2 YEAST DERMATITIS: ICD-10-CM

## 2019-01-21 DIAGNOSIS — E55.9 VITAMIN D DEFICIENCY: ICD-10-CM

## 2019-01-21 DIAGNOSIS — R53.83 FATIGUE, UNSPECIFIED TYPE: ICD-10-CM

## 2019-01-21 PROBLEM — E66.811 OBESITY, CLASS I, BMI 30-34.9: Status: ACTIVE | Noted: 2019-01-21

## 2019-01-21 PROCEDURE — 99395 PREV VISIT EST AGE 18-39: CPT | Performed by: NURSE PRACTITIONER

## 2019-01-21 RX ORDER — POLYETHYLENE GLYCOL 3350 17 G/17G
17 POWDER, FOR SOLUTION ORAL DAILY
Qty: 510 G | Refills: 5 | Status: SHIPPED | OUTPATIENT
Start: 2019-01-21 | End: 2020-02-06 | Stop reason: SDUPTHER

## 2019-01-21 RX ORDER — CLOTRIMAZOLE AND BETAMETHASONE DIPROPIONATE 10; .64 MG/G; MG/G
CREAM TOPICAL
Qty: 45 G | Refills: 0 | Status: SHIPPED | OUTPATIENT
Start: 2019-01-21 | End: 2021-02-08

## 2019-01-21 ASSESSMENT — ENCOUNTER SYMPTOMS
COUGH: 0
EYE DISCHARGE: 0
BLOOD IN STOOL: 0
ABDOMINAL PAIN: 0
ROS SKIN COMMENTS: GROIN RASH
CONSTIPATION: 1
SHORTNESS OF BREATH: 0

## 2020-02-06 ENCOUNTER — OFFICE VISIT (OUTPATIENT)
Dept: FAMILY MEDICINE CLINIC | Age: 40
End: 2020-02-06
Payer: COMMERCIAL

## 2020-02-06 VITALS
WEIGHT: 215.8 LBS | HEIGHT: 69 IN | BODY MASS INDEX: 31.96 KG/M2 | HEART RATE: 77 BPM | OXYGEN SATURATION: 97 % | SYSTOLIC BLOOD PRESSURE: 120 MMHG | DIASTOLIC BLOOD PRESSURE: 76 MMHG

## 2020-02-06 PROBLEM — R53.83 FATIGUE: Status: ACTIVE | Noted: 2020-02-06

## 2020-02-06 PROBLEM — F51.02 ADJUSTMENT INSOMNIA: Status: ACTIVE | Noted: 2020-02-06

## 2020-02-06 PROCEDURE — 99396 PREV VISIT EST AGE 40-64: CPT | Performed by: FAMILY MEDICINE

## 2020-02-06 PROCEDURE — 81003 URINALYSIS AUTO W/O SCOPE: CPT | Performed by: FAMILY MEDICINE

## 2020-02-06 RX ORDER — POLYETHYLENE GLYCOL 3350 17 G/17G
17 POWDER, FOR SOLUTION ORAL DAILY
Qty: 510 G | Refills: 5 | Status: SHIPPED | OUTPATIENT
Start: 2020-02-06 | End: 2020-03-07

## 2020-02-06 SDOH — ECONOMIC STABILITY: TRANSPORTATION INSECURITY
IN THE PAST 12 MONTHS, HAS LACK OF TRANSPORTATION KEPT YOU FROM MEETINGS, WORK, OR FROM GETTING THINGS NEEDED FOR DAILY LIVING?: NO

## 2020-02-06 SDOH — ECONOMIC STABILITY: TRANSPORTATION INSECURITY
IN THE PAST 12 MONTHS, HAS THE LACK OF TRANSPORTATION KEPT YOU FROM MEDICAL APPOINTMENTS OR FROM GETTING MEDICATIONS?: NO

## 2020-02-06 SDOH — ECONOMIC STABILITY: INCOME INSECURITY: HOW HARD IS IT FOR YOU TO PAY FOR THE VERY BASICS LIKE FOOD, HOUSING, MEDICAL CARE, AND HEATING?: NOT HARD AT ALL

## 2020-02-06 SDOH — ECONOMIC STABILITY: FOOD INSECURITY: WITHIN THE PAST 12 MONTHS, THE FOOD YOU BOUGHT JUST DIDN'T LAST AND YOU DIDN'T HAVE MONEY TO GET MORE.: SOMETIMES TRUE

## 2020-02-06 SDOH — ECONOMIC STABILITY: FOOD INSECURITY: WITHIN THE PAST 12 MONTHS, YOU WORRIED THAT YOUR FOOD WOULD RUN OUT BEFORE YOU GOT MONEY TO BUY MORE.: SOMETIMES TRUE

## 2020-02-06 ASSESSMENT — ENCOUNTER SYMPTOMS
COUGH: 0
DIARRHEA: 0
APNEA: 0
NAUSEA: 0
EYE PAIN: 0
CONSTIPATION: 1
TROUBLE SWALLOWING: 0
ABDOMINAL PAIN: 0
RESPIRATORY NEGATIVE: 1
COLOR CHANGE: 0
SHORTNESS OF BREATH: 0
SORE THROAT: 0
CHEST TIGHTNESS: 0
VOMITING: 0

## 2020-02-06 ASSESSMENT — PATIENT HEALTH QUESTIONNAIRE - PHQ9
SUM OF ALL RESPONSES TO PHQ9 QUESTIONS 1 & 2: 0
SUM OF ALL RESPONSES TO PHQ QUESTIONS 1-9: 0
2. FEELING DOWN, DEPRESSED OR HOPELESS: 0
1. LITTLE INTEREST OR PLEASURE IN DOING THINGS: 0
SUM OF ALL RESPONSES TO PHQ QUESTIONS 1-9: 0

## 2020-02-06 NOTE — PATIENT INSTRUCTIONS
when you lie down, start a worry book. Well before bedtime, write down your worries, and then set the book and your concerns aside. · Try meditation or other relaxation techniques before you go to bed. · If you cannot fall asleep, get up and go to another room until you feel sleepy. Do something relaxing. Repeat your bedtime routine before you go to bed again. · Make your house quiet and calm about an hour before bedtime. Turn down the lights, turn off the TV, log off the computer, and turn down the volume on music. This can help you relax after a busy day. When should you call for help? Watch closely for changes in your health, and be sure to contact your doctor if:    · Your efforts to improve your sleep do not work.     · Your insomnia gets worse.     · You have been feeling down, depressed, or hopeless or have lost interest in things that you usually enjoy. Where can you learn more? Go to https://[employer]ion[employer].Testive. org and sign in to your [employer] account. Enter P513 in the [employer] box to learn more about \"Insomnia: Care Instructions. \"     If you do not have an account, please click on the \"Sign Up Now\" link. Current as of: April 7, 2019  Content Version: 12.3  © 6341-5694 Healthwise, Incorporated. Care instructions adapted under license by [facility] ([facility]). If you have questions about a medical condition or this instruction, always ask your healthcare professional. [place] [address] any warranty or liability for your use of this information.

## 2020-02-06 NOTE — PROGRESS NOTES
Visit Information    Have you changed or started any medications since your last visit including any over-the-counter medicines, vitamins, or herbal medicines? no   Are you having any side effects from any of your medications? -  no  Have you stopped taking any of your medications? Is so, why? -  no    Have you seen any other physician or provider since your last visit? No  Have you had any other diagnostic tests since your last visit? No  Have you been seen in the emergency room and/or had an admission to a hospital since we last saw you? No  Have you had your routine dental cleaning in the past 6 months? no    Have you activated your [employer] account? If not, what are your barriers?  Yes     Patient Care Team:  PEPE Linares CNP as PCP - General (Family Medicine)  PEPE Delacruz CNP as PCP - [facility] Provider    Medical History Review  Past Medical, Family, and Social History reviewed and does contribute to the patient presenting condition    Health Maintenance   Topic Date Due    Diabetes screen  07/26/2019    Flu vaccine (1) 09/01/2019    Lipid screen  07/21/2023    DTaP/Tdap/Td vaccine (2 - Td) 07/16/2028    Shingles Vaccine (1 of 2) 07/26/2029    HIV screen  Completed    Hepatitis A vaccine  Aged Out    Hepatitis B vaccine  Aged Out    Hib vaccine  Aged Out    Meningococcal (ACWY) vaccine  Aged Out    Pneumococcal 0-64 years Vaccine  Aged Out

## 2020-02-06 NOTE — PROGRESS NOTES
[address] [address]  Dept: [phone]     Chief Complaint   Patient presents with    Annual Exam     Genesis Benavidez is a 35 y.o. male patient is here today for his annual physical examination. Also, Annual Exam   [employer] Orlando Kiser's chronic conditions includes:  Past Medical History:   Diagnosis Date    GERD (gastroesophageal reflux disease)     Irritable bowel syndrome       Cardiovascular screening:  BP screening: UTD  BP Readings from Last 3 Encounters:   02/06/20 120/76   01/21/19 125/77   12/18/18 (!) 132/97     Lipid screening -women 39 or older, increased risk of CHD:   The 10-year ASCVD risk score (Stefanie Moses, et al., 2013) is: 0.6%    Values used to calculate the score:      Age: 35 years      Sex: Male      Is Non- : No      Diabetic: No      Tobacco smoker: No      Systolic Blood Pressure: 591 mmHg      Is BP treated: No      HDL Cholesterol: 52 mg/dL      Total Cholesterol: 165 mg/dL    Diabetes mellitus screening:  Other: none  Lab Results   Component Value Date    LABA1C 5.3 07/21/2018     Lab Results   Component Value Date     07/21/2018        Depression screening:  The patient reports that domestic violence in his life is absent. Sleep pattern7-9 hours  PHQ-9 Total Score: 0 (2/6/2020  3:16 PM)       Nutritional assessment:  denies Healthy diet:  denies Fluctuation with weight. deniesRegular exercise:  not activenone  Body mass index is 31.87 kg/m². Wt Readings from Last 3 Encounters:   02/06/20 215 lb 12.8 oz (97.9 kg)   01/21/19 203 lb 9.6 oz (92.4 kg)   12/18/18 203 lb (92.1 kg)     Vision/Dentist/hearing  Last eye exam:last year   Visual Acuity Screening    Right eye Left eye Both eyes   Without correction: 20/13 20/13 20/10   With correction:        deniesHearing difficulties.   Last dental exam and preventative dental cleaning:yearly    No results found for: PAP Urinary symptoms:none  Denies prostate cancer history    Colonoscopy Screening:  Colonoscopy N/A  Recommended at 46 y/o  admits tofamily history of colon cancer- FATHER    Social history/Risk Screening:  Social History     Tobacco Use    Smoking status: Never Smoker    Smokeless tobacco: Never Used   Substance Use Topics    Alcohol use: Yes     Alcohol/week: 0.0 standard drinks     Comment: occasionally    Drug use: No      denies Use of recreational drugs none  deniesEver been transfused or tattooed  admits to Wears seatbelts:     Tobacco use screening:No Smoking History = 0  The patient smokes 0 packs per day.    Attempts to quit 0  Lung cancer screenin-78 yo, 30 pack-year currently smoking or have quit within the past 15 years:     Alcohol use:social drinker  no history of illicit drug use    Hepatitis C screening-adults at high risk and adults born between 80-46 No results found for: HAV, HEPAIGM, HEPBIGM, HEPBCAB, HBEAG, HEPCAB   HIV screening No results found for: [id number]     Immunization:  Tdap one time, then Td every 10 years: UTD  Influenza vaccination annually declined  Pneumococcal n/a  Varicella declined  Immunization History   Administered Date(s) Administered    Influenza A (G7V3-12) Vaccine Nasal 2009    Tdap (Boostrix, Adacel) 2018     Patient Active Problem List    Diagnosis Date Noted    Fatigue 2020    Adjustment insomnia 2020    Obesity, Class I, BMI 30-34.9 2019    Vitamin D deficiency 2019    Yeast dermatitis 2019    Family history of diabetes mellitus 2018    Constipation 2018     Past Surgical History:   Procedure Laterality Date    COLONOSCOPY      UPPER GASTROINTESTINAL ENDOSCOPY  08/10/2009     Family History   Problem Relation Age of Onset    Hypertension Mother     Diabetes Father     Cancer Father         colon  death age 71     Current Outpatient Medications   Medication Sig Dispense Refill    polyethylene glycol (MIRALAX) powder Take 17 g by mouth daily 510 g 5    melatonin ER 1 MG TBCR tablet Take 1 tablet by mouth nightly as needed (insomnia) 30 tablet 2    clotrimazole-betamethasone (LOTRISONE) 1-0.05 % cream Apply topically 2 times daily to clean dry skin. Stop cream once skin is less inflamed. 45 g 0    vitamin D (CHOLECALCIFEROL) 1000 UNIT TABS tablet Take 1 tablet by mouth daily 90 tablet 3    nystatin (MYCOSTATIN) 954730 UNIT/GM powder Apply 3 times daily. 1 Bottle 5     No current facility-administered medications for this visit. The patient's past medical, surgical, social, and family history as well as his current medications and allergies were reviewed as documented in today's encounter. Review of Systems   Constitutional: Positive for fatigue. Negative for chills and fever. HENT: Negative for congestion, ear pain, sore throat and trouble swallowing. Eyes: Negative for pain and visual disturbance. Respiratory: Negative. Negative for apnea, cough, chest tightness and shortness of breath. Cardiovascular: Negative. Gastrointestinal: Positive for constipation. Negative for abdominal pain, diarrhea, nausea and vomiting. Endocrine: Negative for cold intolerance and heat intolerance. Genitourinary: Negative for difficulty urinating, dysuria, frequency and genital sores. Musculoskeletal: Negative for arthralgias, gait problem and myalgias. Skin: Negative for color change and rash. Neurological: Negative for weakness, light-headedness and headaches. Psychiatric/Behavioral: Positive for sleep disturbance. Negative for agitation, behavioral problems and decreased concentration. The patient is nervous/anxious. /76   Pulse 77   Ht 5' 9\" (1.753 m)   Wt 215 lb 12.8 oz (97.9 kg)   SpO2 97%   BMI 31.87 kg/m²   Physical Exam  Vitals signs and nursing note reviewed. Constitutional:       Appearance: He is well-developed. He is obese.    HENT:      Head: Normocephalic. Right Ear: External ear normal.      Left Ear: External ear normal.      Nose: Nose normal.   Eyes:      General: No scleral icterus. Conjunctiva/sclera: Conjunctivae normal.      Pupils: Pupils are equal, round, and reactive to light. Neck:      Musculoskeletal: Normal range of motion and neck supple. Thyroid: No thyromegaly. Vascular: No JVD. Cardiovascular:      Rate and Rhythm: Normal rate and regular rhythm. Heart sounds: Normal heart sounds. No murmur. No friction rub. No gallop. Pulmonary:      Effort: Pulmonary effort is normal. No respiratory distress. Breath sounds: Normal breath sounds. No wheezing or rales. Abdominal:      General: Abdomen is protuberant. Bowel sounds are normal. There is no distension. Palpations: Abdomen is soft. Tenderness: There is no abdominal tenderness. There is no guarding or rebound. Hernia: No hernia is present. Musculoskeletal: Normal range of motion. General: No tenderness. Lymphadenopathy:      Cervical: No cervical adenopathy. Skin:     General: Skin is warm and dry. Capillary Refill: Capillary refill takes less than 2 seconds. Findings: No rash. Neurological:      Mental Status: He is alert and oriented to person, place, and time. Sensory: No sensory deficit. Coordination: Coordination normal.   Psychiatric:         Mood and Affect: Mood is anxious. Behavior: Behavior normal.         Thought Content:  Thought content normal.         Judgment: Judgment normal.         Lab Results   Component Value Date    WBC 5.4 07/21/2018    HGB 15.1 07/21/2018    HCT 44.0 07/21/2018    MCV 89.7 07/21/2018     07/21/2018     Lab Results   Component Value Date     07/21/2018    K 4.6 07/21/2018     07/21/2018    CO2 28 07/21/2018    BUN 15 07/21/2018    CREATININE 0.74 07/21/2018    GLUCOSE 114 07/21/2018    CALCIUM 9.1 07/21/2018      Lab Results   Component Value Date    ALT 18 07/21/2018    AST 18 07/21/2018    ALKPHOS 67 07/21/2018    BILITOT 0.42 07/21/2018     Lab Results   Component Value Date    TSH 0.67 07/21/2018     Lab Results   Component Value Date    CHOL 165 07/21/2018    CHOL 197 02/07/2017    CHOL 197 12/05/2015     Lab Results   Component Value Date    TRIG 48 07/21/2018    TRIG 115 02/07/2017    TRIG 89 12/05/2015     Lab Results   Component Value Date    HDL 52 07/21/2018    HDL 54 02/07/2017    HDL 48 12/05/2015     Lab Results   Component Value Date    LDLCHOLESTEROL 103 07/21/2018    LDLCHOLESTEROL 120 02/07/2017    LDLCHOLESTEROL 131 (H) 12/05/2015     Lab Results   Component Value Date    CHOLHDLRATIO 3.2 07/21/2018    CHOLHDLRATIO 3.6 02/07/2017    CHOLHDLRATIO 4.1 12/05/2015     Lab Results   Component Value Date    LABA1C 5.3 07/21/2018       Lab Results   Component Value Date    VITD25 29.6 (L) 08/13/2018     I personally reviewed testing with patient. Otherwise labs within normal limits  ASSESSMENT AND PLAN  1. Encounter for annual health examination  Annual  Get blood work done    - CBC Auto Differential; Future  - Comprehensive Metabolic Panel, Fasting; Future  - Hemoglobin A1C; Future  - Lipid, Fasting; Future  - Vitamin D 25 Hydroxy; Future  - Urinalysis Reflex to Culture; Future  - TSH without Reflex; Future    2. Constipation, unspecified constipation type  Ongoing  Drink plenty of fluids, enough so that your urine is light yellow or clear like water. Advised to Include high-fiber foods in your diet each day. These include fruits, vegetables, beans, and whole grains. Get at least 30 minutes of exercise on most days of the week. Take a fiber supplement, such as Citrucel (Methylcellulose fiber) or Metamucil (Psyllium), every day. Schedule time each day for a bowel movement. - polyethylene glycol (MIRALAX) powder; Take 17 g by mouth daily  Dispense: 510 g; Refill: 5    3.  Adjustment insomnia  Ongoing  Start Melatonin  Cut down intake of drinks with caffeine, such as coffee or black tea, for 8 hours before bed. Cut down on smoking or use other types of tobacco near bedtime. Cut down on Nicotine and alcohol   Advised to stop eating a big meal close to bedtime. Advised to stop drinking a lot of  fluids close to bedtime.    - melatonin ER 1 MG TBCR tablet; Take 1 tablet by mouth nightly as needed (insomnia)  Dispense: 30 tablet; Refill: 2    4. Vitamin D deficiency  Ongoing  Foods that contain a lot of vitamin D includes [place], tuna, and mackerel. Cheese, egg yolks, and beef liver have small amounts of vit D.  Milk, soy drinks, orange juice, yogurt, margarine, and some kinds of cereal have vitamin D added to them. Continue to use sunblock when out in the sun to prevent skin cancer.  - Vitamin D 25 Hydroxy; Future    5. Fatigue, unspecified type  Ongoing  Evaluate endocrine conditions  - TSH without Reflex; Future    6. Obesity, Class I, BMI 30-34.9  Ongoing  BMI increasing  Advised to eat a low-fat and low carbohydrates diet. Avoid fried foods especially fast food. Choose healthier options for snacks. Have 5-6 servings of fruits and vegetables per day. Cut down on eating processed food. Add 30 minutes to 1 hour aerobic exercise for 3-4 days a week. 7. Screening for prostate cancer  Recommended    - Psa screening; Future    8. Preventative health care  Initial visit  - CBC Auto Differential; Future  - Comprehensive Metabolic Panel, Fasting; Future  - Hemoglobin A1C; Future  - Lipid, Fasting; Future  - Vitamin D 25 Hydroxy; Future  - Urinalysis Reflex to Culture; Future  - TSH without Reflex; Future  - Psa screening; Future      Health Maintenance Due   Topic Date Due    Diabetes screen  07/26/2019     Health Maintenance reviewed   No influenza vaccine available.  Education Reviewed and Recommended: Weight Bearing Exercise, Low Fat, Low Cholesterol Diet   Healthful diet and Physical activity counseling to prevent CVD- via [place]? Yes    Requested Prescriptions     Signed Prescriptions Disp Refills    polyethylene glycol (MIRALAX) powder 510 g 5     Sig: Take 17 g by mouth daily    melatonin ER 1 MG TBCR tablet 30 tablet 2     Sig: Take 1 tablet by mouth nightly as needed (insomnia)       All patient questions answered. Patient voiced understanding. Quality Measures    Body mass index is 31.87 kg/m². Elevated. Weight control planned discussed Healthy diet and regular exercise. BP: 120/76 Blood pressure is normal. Treatment plan consists of No treatment change needed. Lab Results   Component Value Date    LDLCHOLESTEROL 103 07/21/2018    (goal LDL reduction with dx if diabetes is 50% LDL reduction)      PHQ Scores 2/6/2020 5/22/2018 2/7/2017 11/30/2015   PHQ2 Score 0 0 0 0   PHQ9 Score 0 0 0 0     Interpretation of Total Score Depression Severity: 1-4 = Minimal depression, 5-9 = Mild depression, 10-14 = Moderate depression, 15-19 = Moderately severe depression, 20-27 = Severe depression  The patient's past medical, surgical, social, andfamily history as well as his   current medications and allergies were reviewed as documented in today's encounter. Medications, labs, diagnostic studies, consultations and follow-up as documented in thisencounter. Return in about 1 year (around 2/6/2021) for annual exam, call for lab order, . Patient was seen with total face to face timeof 30 minutes. More than 50% of this visit was counseling and education. Future Appointments   Date Time Provider Ruddy Sim   2/8/2021  3:00 PM PEPE Patterson CNP [facility]     This note was completed by using the assistance of a speech-recognition program. However, inadvertent computerized transcription errors may be present. Although every effort was made to ensure accuracy, no guarantees can be provided that every mistake has been identified and corrected by editing.   Electronically signed by PEPE Grijalva CNP

## 2020-02-08 ENCOUNTER — HOSPITAL ENCOUNTER (OUTPATIENT)
Age: 40
Discharge: HOME | End: 2020-02-08
Payer: COMMERCIAL

## 2020-02-08 LAB
ABSOLUTE EOS #: 0.2 K/UL (ref 0–0.4)
ABSOLUTE IMMATURE GRANULOCYTE: ABNORMAL K/UL (ref 0–0.3)
ABSOLUTE LYMPH #: 1.7 K/UL (ref 1–4.8)
ABSOLUTE MONO #: 0.4 K/UL (ref 0.1–1.3)
ALBUMIN SERPL-MCNC: 4.5 G/DL (ref 3.5–5.2)
ALBUMIN/GLOBULIN RATIO: ABNORMAL (ref 1–2.5)
ALP BLD-CCNC: 71 U/L (ref 40–129)
ALT SERPL-CCNC: 26 U/L (ref 5–41)
ANION GAP SERPL CALCULATED.3IONS-SCNC: 11 MMOL/L (ref 9–17)
AST SERPL-CCNC: 21 U/L
BASOPHILS # BLD: 1 % (ref 0–2)
BASOPHILS ABSOLUTE: 0.1 K/UL (ref 0–0.2)
BILIRUB SERPL-MCNC: 0.4 MG/DL (ref 0.3–1.2)
BILIRUBIN URINE: NEGATIVE
BUN BLDV-MCNC: 14 MG/DL (ref 6–20)
BUN/CREAT BLD: ABNORMAL (ref 9–20)
CALCIUM SERPL-MCNC: 9.3 MG/DL (ref 8.6–10.4)
CHLORIDE BLD-SCNC: 104 MMOL/L (ref 98–107)
CHOLESTEROL, FASTING: 203 MG/DL
CHOLESTEROL/HDL RATIO: 4.1
CO2: 28 MMOL/L (ref 20–31)
COLOR: YELLOW
COMMENT UA: NORMAL
CREAT SERPL-MCNC: 0.72 MG/DL (ref 0.7–1.2)
DIFFERENTIAL TYPE: ABNORMAL
EOSINOPHILS RELATIVE PERCENT: 3 % (ref 0–4)
GFR AFRICAN AMERICAN: >60 ML/MIN
GFR NON-AFRICAN AMERICAN: >60 ML/MIN
GFR SERPL CREATININE-BSD FRML MDRD: ABNORMAL ML/MIN/{1.73_M2}
GFR SERPL CREATININE-BSD FRML MDRD: ABNORMAL ML/MIN/{1.73_M2}
GLUCOSE FASTING: 114 MG/DL (ref 70–99)
GLUCOSE URINE: NEGATIVE
HCT VFR BLD CALC: 47.1 % (ref 41–53)
HDLC SERPL-MCNC: 50 MG/DL
HEMOGLOBIN: 15.9 G/DL (ref 13.5–17.5)
IMMATURE GRANULOCYTES: ABNORMAL %
KETONES, URINE: NEGATIVE
LDL CHOLESTEROL: 129 MG/DL (ref 0–130)
LEUKOCYTE ESTERASE, URINE: NEGATIVE
LYMPHOCYTES # BLD: 31 % (ref 24–44)
MCH RBC QN AUTO: 30.6 PG (ref 26–34)
MCHC RBC AUTO-ENTMCNC: 33.8 G/DL (ref 31–37)
MCV RBC AUTO: 90.4 FL (ref 80–100)
MONOCYTES # BLD: 8 % (ref 1–7)
NITRITE, URINE: NEGATIVE
NRBC AUTOMATED: ABNORMAL PER 100 WBC
PDW BLD-RTO: 13.2 % (ref 11.5–14.9)
PH UA: 7.5 (ref 5–8)
PLATELET # BLD: 298 K/UL (ref 150–450)
PLATELET ESTIMATE: ABNORMAL
PMV BLD AUTO: 8.3 FL (ref 6–12)
POTASSIUM SERPL-SCNC: 4.3 MMOL/L (ref 3.7–5.3)
PROSTATE SPECIFIC ANTIGEN: 1.25 UG/L
PROTEIN UA: NEGATIVE
RBC # BLD: 5.21 M/UL (ref 4.5–5.9)
RBC # BLD: ABNORMAL 10*6/UL
SEG NEUTROPHILS: 57 % (ref 36–66)
SEGMENTED NEUTROPHILS ABSOLUTE COUNT: 3.1 K/UL (ref 1.3–9.1)
SODIUM BLD-SCNC: 143 MMOL/L (ref 135–144)
SPECIFIC GRAVITY UA: 1.02 (ref 1–1.03)
TOTAL PROTEIN: 7.7 G/DL (ref 6.4–8.3)
TRIGLYCERIDE, FASTING: 120 MG/DL
TSH SERPL DL<=0.05 MIU/L-ACNC: 0.65 MIU/L (ref 0.3–5)
TURBIDITY: CLEAR
URINE HGB: NEGATIVE
UROBILINOGEN, URINE: NORMAL
VITAMIN D 25-HYDROXY: 24.7 NG/ML (ref 30–100)
VLDLC SERPL CALC-MCNC: ABNORMAL MG/DL (ref 1–30)
WBC # BLD: 5.5 K/UL (ref 3.5–11)
WBC # BLD: ABNORMAL 10*3/UL

## 2020-02-08 PROCEDURE — 80053 COMPREHEN METABOLIC PANEL: CPT

## 2020-02-08 PROCEDURE — 85025 COMPLETE CBC W/AUTO DIFF WBC: CPT

## 2020-02-08 PROCEDURE — 83036 HEMOGLOBIN GLYCOSYLATED A1C: CPT

## 2020-02-08 PROCEDURE — 82306 VITAMIN D 25 HYDROXY: CPT

## 2020-02-08 PROCEDURE — G0103 PSA SCREENING: HCPCS

## 2020-02-08 PROCEDURE — 36415 COLL VENOUS BLD VENIPUNCTURE: CPT

## 2020-02-08 PROCEDURE — 80061 LIPID PANEL: CPT

## 2020-02-08 PROCEDURE — 81003 URINALYSIS AUTO W/O SCOPE: CPT

## 2020-02-08 PROCEDURE — 84443 ASSAY THYROID STIM HORMONE: CPT

## 2020-02-09 LAB
ESTIMATED AVERAGE GLUCOSE: 105 MG/DL
HBA1C MFR BLD: 5.3 % (ref 4–6)

## 2020-04-15 RX ORDER — MELATONIN
Qty: 90 TABLET | Refills: 3 | Status: SHIPPED | OUTPATIENT
Start: 2020-04-15

## 2020-12-28 ENCOUNTER — HOSPITAL ENCOUNTER (EMERGENCY)
Age: 40
Discharge: HOME | End: 2020-12-28
Attending: EMERGENCY MEDICINE
Payer: COMMERCIAL

## 2020-12-28 ENCOUNTER — APPOINTMENT (OUTPATIENT)
Dept: GENERAL RADIOLOGY | Age: 40
End: 2020-12-28
Payer: COMMERCIAL

## 2020-12-28 VITALS
WEIGHT: 210 LBS | BODY MASS INDEX: 31.01 KG/M2 | RESPIRATION RATE: 16 BRPM | OXYGEN SATURATION: 99 % | TEMPERATURE: 98.4 F | DIASTOLIC BLOOD PRESSURE: 84 MMHG | SYSTOLIC BLOOD PRESSURE: 135 MMHG | HEART RATE: 75 BPM

## 2020-12-28 PROCEDURE — 6370000000 HC RX 637 (ALT 250 FOR IP): Performed by: PHYSICIAN ASSISTANT

## 2020-12-28 PROCEDURE — 90715 TDAP VACCINE 7 YRS/> IM: CPT | Performed by: PHYSICIAN ASSISTANT

## 2020-12-28 PROCEDURE — 12001 RPR S/N/AX/GEN/TRNK 2.5CM/<: CPT

## 2020-12-28 PROCEDURE — 6360000002 HC RX W HCPCS: Performed by: PHYSICIAN ASSISTANT

## 2020-12-28 PROCEDURE — 90471 IMMUNIZATION ADMIN: CPT | Performed by: PHYSICIAN ASSISTANT

## 2020-12-28 PROCEDURE — 2500000003 HC RX 250 WO HCPCS: Performed by: PHYSICIAN ASSISTANT

## 2020-12-28 PROCEDURE — 99283 EMERGENCY DEPT VISIT LOW MDM: CPT

## 2020-12-28 PROCEDURE — 73140 X-RAY EXAM OF FINGER(S): CPT

## 2020-12-28 RX ORDER — CEPHALEXIN 500 MG/1
500 CAPSULE ORAL 4 TIMES DAILY
Qty: 28 CAPSULE | Refills: 0 | Status: SHIPPED | OUTPATIENT
Start: 2020-12-28 | End: 2021-01-04

## 2020-12-28 RX ORDER — HYDROCODONE BITARTRATE AND ACETAMINOPHEN 5; 325 MG/1; MG/1
1 TABLET ORAL ONCE
Status: COMPLETED | OUTPATIENT
Start: 2020-12-28 | End: 2020-12-28

## 2020-12-28 RX ORDER — LIDOCAINE HYDROCHLORIDE 10 MG/ML
20 INJECTION, SOLUTION INFILTRATION; PERINEURAL ONCE
Status: COMPLETED | OUTPATIENT
Start: 2020-12-28 | End: 2020-12-28

## 2020-12-28 RX ADMIN — TETANUS TOXOID, REDUCED DIPHTHERIA TOXOID AND ACELLULAR PERTUSSIS VACCINE, ADSORBED 0.5 ML: 5; 2.5; 8; 8; 2.5 SUSPENSION INTRAMUSCULAR at 13:34

## 2020-12-28 RX ADMIN — LIDOCAINE HYDROCHLORIDE 20 ML: 10 INJECTION, SOLUTION INFILTRATION; PERINEURAL at 14:51

## 2020-12-28 RX ADMIN — HYDROCODONE BITARTRATE AND ACETAMINOPHEN 1 TABLET: 5; 325 TABLET ORAL at 13:34

## 2020-12-28 ASSESSMENT — PAIN SCALES - GENERAL
PAINLEVEL_OUTOF10: 5

## 2020-12-28 NOTE — ED NOTES
Pt comes to this ER with c/o lt thumb laceration. Pt states he cut his thumb on a saw blade around 11:00 today. Bleeding is controlled by a dressing. Pt arrives A+O x 4, GCS = 15, PMS x 4 intact, eupneic, and PWD.      Ashley Cooley RN  12/28/20 [id number]

## 2020-12-28 NOTE — ED PROVIDER NOTES
eMERGENCY dEPARTMENT eNCOUnter   Independent Attestation     Pt Name: Gloria Ramirez  MRN: [id number]  [place] 1980  Date of evaluation: 12/28/20     Gloria Ramirez is a 38 y.o. male with CC: Laceration (Lt thumb laceration)      Based on the medical record the care appears appropriate. I was personally available for consultation in the Emergency Department.     Dacia Cummings DO  Attending Emergency Physician                  Dacia Cummings DO  12/28/20 0446

## 2020-12-28 NOTE — ED PROVIDER NOTES
[address]  eMERGENCY dEPARTMENT eNCOUnter      Pt Name: Raul Lombardi  MRN: [id number]  [place] 1980  Date of evaluation: 12/28/2020  Provider: Karena Rodriguez PA-C    CHIEF COMPLAINT       Chief Complaint   Patient presents with    Laceration     Lt thumb laceration           HISTORY OF PRESENT ILLNESS  (Location/Symptom, Timing/Onset, Context/Setting, Quality, Duration, Modifying Factors, Severity.)   Raul Lombardi is a 38 y.o. male who presents to the emergency department c/o laceration to left thumb. States they cut left thumb on a saw blade today around 11am. Denies any numbness or tingling. Denies any other complaints. Pt is right handed. Up to date on tetanus: no       Nursing Notes were reviewed. REVIEW OF SYSTEMS    (2-9 systems for level 4, 10 or more for level 5)     Review of Systems   Laceration to left thumb     Except as noted above the remainder of the review of systems was reviewed and negative. PAST MEDICAL HISTORY     Past Medical History:   Diagnosis Date    GERD (gastroesophageal reflux disease)     Irritable bowel syndrome      None otherwise stated in nurses notes    SURGICAL HISTORY       Past Surgical History:   Procedure Laterality Date    COLONOSCOPY      UPPER GASTROINTESTINAL ENDOSCOPY  08/10/2009     None otherwise stated in nurses notes    CURRENT MEDICATIONS       Discharge Medication List as of 12/28/2020  2:53 PM      CONTINUE these medications which have NOT CHANGED    Details   Cholecalciferol (VITAMIN D3) 25 MCG (1000 UT) TABS TAKE 1 TABLET BY MOUTH DAILY, Disp-90 tablet, R-3Normal      melatonin ER 1 MG TBCR tablet Take 1 tablet by mouth nightly as needed (insomnia), Disp-30 tablet, R-2Normal      clotrimazole-betamethasone (LOTRISONE) 1-0.05 % cream Apply topically 2 times daily to clean dry skin. Stop cream once skin is less inflamed. , Disp-45 g, R-0, Normal      nystatin (MYCOSTATIN) 200734 UNIT/GM powder Apply 3 times daily. , Disp-1 Bottle, R-5, Normal             ALLERGIES     Patient has no known allergies. FAMILY HISTORY           Problem Relation Age of Onset    Hypertension Mother     Diabetes Father     Cancer Father         colon  death age 71     Family Status   Relation Name Status    Mother  Alive    Father        None otherwise stated in nurses notes    SOCIAL HISTORY      reports that he has never smoked. He has never used smokeless tobacco. He reports current alcohol use. He reports that he does not use drugs. lives at home with others     PHYSICAL EXAM    (up to 7 for level 4, 8 or more for level 5)     ED Triage Vitals [20 1254]   BP Temp Temp Source Pulse Resp SpO2 Height Weight   135/84 98.4 °F (36.9 °C) Oral 75 16 99 % -- 210 lb (95.3 kg)       Physical Exam   Constitutional: well-developed, well-nourished, nontoxic, well appearing, not distressed  HEENT:  normocephalic atraumatic, external ears normal appearance, no nasal deformity, no neck masses or edema, patient protecting airway, no stridor, phonating well  Eyes: pupils equal, sclera non-icteric, no discharge  Cardiovascular: no JVD  Respiratory: non-labored breathing, effort normal, no accessory muscle use visulized, no audible wheezing  Gastrointestinal: Abdomen not distended  Musculoskeletal: Examination of left thumb reveals FROM. 5/5 strength. Brisk cap refill. Distal sensation intact. 2/2 radial pulse. Skin: examination of left thumb reveals laceration through nail. There is a 0.5cm jagged laceration through pad of left thumb. Bleeding controlled. No visible foreign bodies.    Neuro: alert and oriented times 3, GCS 15, normal coordination, no dysarthria or aphasia  Psych: normal mood and affect, cooperative, normal thought content              DIAGNOSTIC RESULTS         RADIOLOGY:   All plain film, CT, MRI, and formal ultrasound images (except ED bedside ultrasound) are read by the radiologist, see reports below, unless otherwise noted in MDM or here. No results found. LABS:  Labs Reviewed - No data to display    All other labs were within normal range or not returned as of this dictation. EMERGENCY DEPARTMENT COURSE and DIFFERENTIAL DIAGNOSIS/MDM:   Vitals:    Vitals:    12/28/20 1254   BP: 135/84   Pulse: 75   Resp: 16   Temp: 98.4 °F (36.9 °C)   TempSrc: Oral   SpO2: 99%   Weight: 210 lb (95.3 kg)           PROCEDURES:  Laceration Repair Procedure Note    Indication: Laceration    Procedure: The patient was placed in the appropriate position and anesthesia around the laceration was obtained by infiltration using 1% Lidocaine without epinephrine. The area was then irrigated with normal saline. The laceration was closed with 5-0 Ethilon using interrupted sutures. There were no additional lacerations requiring repair. The wound area was then dressed with gauze. Total repaired wound length: 1 cm. Other Items: Suture count: 3    The patient tolerated the procedure well. Complications: None        Laceration through left thumb nail. Small jagged laceration over pad of thumb. There are no visible foreign bodies. Xray shows no fracture. Jagged laceration repaired with 3 sutures. Will not remove thumb nail. Will start on keflex. boosterix updated. Referred to dr. Elvie Collet. Wound care instructions given. Pt instructed to have sutures removed in 7-10 days by pcp. In unable to f/u, return for suture removal. Pt agrees. Instructed to return for signs of infection including redness, swelling, fevers chills, increased pain, red streaking, pus drainage.     ED MEDS:  Orders Placed This Encounter   Medications    HYDROcodone-acetaminophen (NORCO) 5-325 MG per tablet 1 tablet    Tetanus-Diphth-Acell Pertussis (BOOSTRIX) injection 0.5 mL    lidocaine 1 % injection 20 mL    cephALEXin (KEFLEX) 500 MG capsule     Sig: Take 1 capsule by mouth 4 times daily for 7 days     Dispense:  28 capsule     Refill:  0 CONSULTS:  None          FINAL IMPRESSION      1.  Laceration of left thumb without foreign body with damage to nail, initial encounter          DISPOSITION/PLAN   DISPOSITION Decision To Discharge    PATIENT REFERRED TO:  Alyssa Moulton, APRN - CNP  [address]  [facility] [address] [facility]  [facility] [address]  [address] [facility] [address], [address] [address] [address] [facility] [address] [phone]            DISCHARGE MEDICATIONS:  Discharge Medication List as of 12/28/2020  2:53 PM      START taking these medications    Details   cephALEXin (KEFLEX) 500 MG capsule Take 1 capsule by mouth 4 times daily for 7 days, Disp-28 capsule, R-0Normal             (Please note that portions of this note were completed with a voice recognition program.  Efforts were made to edit the dictations but occasionally words are mis-transcribed.)    Tavia [address], PA-C  12/28/20 [id number]

## 2020-12-30 ENCOUNTER — TELEPHONE (OUTPATIENT)
Dept: FAMILY MEDICINE CLINIC | Age: 40
End: 2020-12-30

## 2020-12-30 NOTE — TELEPHONE ENCOUNTER
[facility]) ED Follow up Call    Reason for ED visit:  [address] [facility] , this is Linettepetar Correa from Dr. Kate Daniel's office, just calling to see how you are doing after your recent ED visit. Did you receive discharge instructions? Yes  Do you understand the discharge instructions? Yes  Did the ED give you any new prescriptions? Yes  Were you able to fill your prescriptions? Yes      Do you have one of our red, yellow and green  Zone sheets that help you to determine when you should go to the ED? Not Applicable      Do you need or want to make a follow up appt with your PCP? Yes    Do you have any further needs in the home i.e. Equipment?   Not Applicable        FU appts/Provider:    Future Appointments   Date Time Provider Ruddy Sim   2/8/2021  3:00 PM Barry Daniel, PEPE - CNP [facility]

## 2021-02-05 NOTE — PROGRESS NOTES
[address]  [address] [facility] [address]  Dept: [phone]     Chief Complaint   Patient presents with    Annual Exam      Here for annual exam. Also, Annual Exam    SHIFT WORK SLEEP DISORDER/INSOMNIA  Patient works ar [employer] and does shiftwork. He c/o some hypersomnia and insomnia. He was given some melatonin with mild success. Especially when shifts changes drastically. CONSTIPATION  Patient still c/o constipation. He tries some metamucil with no success. He c/o increase gassiness/bloatingness. Patient had colonoscopy when he was 26 due to constipation. No polyps, diverticulosis. DYSLIPIDEMIA  Ross Dunn has a known history of dyslipidemia. Patient is on healthy lifestyl to help improve dyslipidemia. Patient denies adverse reaction to this therapy. The patient is not known to have coexisting coronary artery disease. ASCVD Score below. The 10-year CVD risk score (JOJO'Sheldon, et al., 2008) is: 3.9%    Values used to calculate the score:      Age: 38 years      Sex: Male      Diabetic: No      Tobacco smoker: No      Systolic Blood Pressure: 673 mmHg      Is BP treated: No      HDL Cholesterol: 50 mg/dL      Total Cholesterol: 203 mg/dL  Lab Results   Component Value Date/Time    CHOLFAST 203 (H) 02/08/2020 08:23 AM    CHOL 165 07/21/2018 07:47 AM    HDL 50 02/08/2020 08:23 AM    LDLCHOLESTEROL 129 02/08/2020 08:23 AM    TRIGLYCFAST 120 02/08/2020 08:23 AM    CHOLHDLRATIO 4.1 02/08/2020 08:23 AM    VLDL NOT REPORTED 02/08/2020 08:23 AM     VITAMIN D  Patient has a known Vitamin D Deficiency. he had a course of supplementation for 12 weeks. he is due to get levels check. We continue to discuss ways to manage this condition. We also discussed ways to improve the levels. Such as learning food groups that are high in Vitamin D.  We also discuss that sun exposure is beneficial however, too much sun and sun damage can potentially result 21 223 lb (101.2 kg)   20 210 lb (95.3 kg)   20 215 lb 12.8 oz (97.9 kg)     Healthful diet and Physical activity counseling to prevent CVD- low carb, low fat diet, increase fruits and vegetables, and exercise 4-5 times a day 30-40 minutes a day discussed  Tobacco use screening: never smoked  Lung cancer screenin-78 yo, 30 pack-year currently smoking or have quit within the past 15 years:   Alcohol use:social drinker  Immunization History   Administered Date(s) Administered    Influenza A ([id number]) Vaccine Nasal 2009    Tdap (Boostrix, Adacel) 2018, 2020     Tdap onetime, then Td every 10 years  Influenza annually  PPSV 23 in all adults 19-63 yo with chronic conditions, smokers, alcoholism,  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 Colonoscopy recommended at 47  The patient has yes family history of colon cancer  The patient has yesfamily history of cancer.   BP screening  BP Readings from Last 3 Encounters:   21 118/82   20 135/84   20 120/76     Pulse Readings from Last 3 Encounters:   21 82   20 75   20 77     Diabetes mellitus type 2 screening if sustained /80  Lab Results   Component Value Date    LABA1C 5.3 2020     Lipid screening   Lab Results   Component Value Date    CHOL 165 2018    CHOL 197 2017    CHOL 197 2015     Lab Results   Component Value Date    TRIG 48 2018    TRIG 115 2017    TRIG 89 2015     Lab Results   Component Value Date    HDL 50 2020    HDL 52 2018    HDL 54 2017     Lab Results   Component Value Date    LDLCHOLESTEROL 129 2020    LDLCHOLESTEROL 103 2018    LDLCHOLESTEROL 120 2017     Lab Results   Component Value Date    CHOLHDLRATIO 4.1 2020    CHOLHDLRATIO 3.2 2018    CHOLHDLRATIO 3.6 2017     The 10-year ASCVD risk score (Linnell Jeans., et al., 2013) is: 1.1%    Values used to calculate the score: Age: 38 years      Sex: Male      Is Non- : No      Diabetic: No      Tobacco smoker: No      Systolic Blood Pressure: 222 mmHg      Is BP treated: No      HDL Cholesterol: 50 mg/dL      Total Cholesterol: 203 mg/dL  Hepatitis C screening-adults at high risk and adults born between 0219-8505  No results found for: HAV, HEPAIGM, HEPBIGM, HEPBCAB, HBEAG, HEPCAB  Fall screening-any fall in the past 1 yearNot at risk for falls. PHQ Scores 2/8/2021 2/6/2020 5/22/2018 2/7/2017 11/30/2015   PHQ2 Score 1 0 0 0 0   PHQ9 Score 1 0 0 0 0     Interpretation of Total Score Depression Severity: 1-4 = Minimal depression, 5-9 = Mild depression, 10-14 = Moderate depression, 15-19 = Moderately severedepression, 20-27 = Severe depression  Health Maintenance reviewed - annual labs. Health Maintenance Due   Topic Date Due    Hepatitis C screen  1980     Patient Active Problem List    Diagnosis Date Noted    Shift work sleep disorder 02/08/2021    Fatigue 02/06/2020    Adjustment insomnia 02/06/2020    Obesity, Class I, BMI 30-34.9 01/21/2019    Vitamin D deficiency 01/21/2019    Yeast dermatitis 01/21/2019    Family history of diabetes mellitus 07/16/2018    Constipation 07/16/2018     Past Medical History:   Diagnosis Date    GERD (gastroesophageal reflux disease)     Irritable bowel syndrome      Past Surgical History:   Procedure Laterality Date    COLONOSCOPY      UPPER GASTROINTESTINAL ENDOSCOPY  08/10/2009     Family History   Problem Relation Age of Onset    Hypertension Mother     Diabetes Father     Cancer Father         colon  death age 71     Social History     Tobacco Use    Smoking status: Never Smoker    Smokeless tobacco: Never Used   Substance Use Topics    Alcohol use:  Yes     Alcohol/week: 0.0 standard drinks     Comment: occasionally    Drug use: No     Current Outpatient Medications   Medication Sig Dispense Refill    Wheat Dextrin (BENEFIBER ON THE GO) POWD Take 1 Package by mouth daily Mix with 8 ounce drink daily. 100 each 3    modafinil (PROVIGIL) 100 MG tablet Take 1 tablet by mouth daily for 30 days. 30 tablet 0    melatonin ER 1 MG TBCR tablet Take 1 tablet by mouth nightly as needed (insomnia) 30 tablet 2    Cholecalciferol (VITAMIN D3) 25 MCG (1000 UT) TABS TAKE 1 TABLET BY MOUTH DAILY 90 tablet 3     No current facility-administered medications for this visit. The patient's past medical, surgical, social,and family history as well as his current medications and allergies were reviewed as documented in today's encounter. ROS:    Constitutional:  Negative for fatigue  HENT:  Negative for congestion, rhinitis, sore throat, normal hearing  Eyes:  No vision issues  Resp:  Negative for SOB, wheezing, cough  Cardiovascular: Negative for CP,   Gastrointestinal: Negative for abd pain and N/V, normal BMs  :  Negative for dysuria and enuresis  Musculoskeletal:  Negative for myalgias  Skin: Negative for rash, change in moles, and sunburn. Acne:none   Neuro:  Negative for dizziness, headache, syncopal episodes  Psych: negative for depression or anxiety  -vital signs stable and within normal limits except   /82   Pulse 82   Temp 97.3 °F (36.3 °C) (Temporal)   Ht 5' 9\" (1.753 m)   Wt 223 lb (101.2 kg)   SpO2 98%   BMI 32.93 kg/m²   Physical Exam  Vitals signs and nursing note reviewed. Constitutional:       Appearance: He is well-developed. He is obese. HENT:      Head: Normocephalic. Right Ear: External ear normal.      Left Ear: External ear normal.      Nose: Nose normal.   Eyes:      General: No scleral icterus. Conjunctiva/sclera: Conjunctivae normal.      Pupils: Pupils are equal, round, and reactive to light. Neck:      Musculoskeletal: Normal range of motion and neck supple. Thyroid: No thyromegaly. Vascular: No JVD. Cardiovascular:      Rate and Rhythm: Normal rate and regular rhythm.       Heart sounds: Normal heart sounds. No murmur. No friction rub. No gallop. Pulmonary:      Effort: Pulmonary effort is normal. No respiratory distress. Breath sounds: Normal breath sounds. No wheezing or rales. Abdominal:      General: Abdomen is protuberant. Bowel sounds are normal. There is no distension. Palpations: Abdomen is soft. Tenderness: There is no abdominal tenderness. There is no guarding or rebound. Hernia: No hernia is present. Musculoskeletal: Normal range of motion. General: No tenderness. Lymphadenopathy:      Cervical: No cervical adenopathy. Skin:     General: Skin is warm and dry. Capillary Refill: Capillary refill takes less than 2 seconds. Findings: No rash. Neurological:      Mental Status: He is alert and oriented to person, place, and time. Sensory: No sensory deficit. Coordination: Coordination normal.      Comments: Varicose veins both legs   Psychiatric:         Mood and Affect: Mood is anxious. Behavior: Behavior normal.         Thought Content: Thought content normal.         Judgment: Judgment normal.          I personally reviewed testing with patient.   Otherwise labs within normal limits  Lab Results   Component Value Date    WBC 5.5 02/08/2020    HGB 15.9 02/08/2020    HCT 47.1 02/08/2020    MCV 90.4 02/08/2020     02/08/2020     Lab Results   Component Value Date     02/08/2020    K 4.3 02/08/2020     02/08/2020    CO2 28 02/08/2020    BUN 14 02/08/2020    CREATININE 0.72 02/08/2020    GLUCOSE 114 07/21/2018    CALCIUM 9.3 02/08/2020      Lab Results   Component Value Date    ALT 26 02/08/2020    AST 21 02/08/2020    ALKPHOS 71 02/08/2020    BILITOT 0.40 02/08/2020     Lab Results   Component Value Date    TSH 0.65 02/08/2020     Lab Results   Component Value Date    CHOL 165 07/21/2018    CHOL 197 02/07/2017    CHOL 197 12/05/2015     Lab Results   Component Value Date    TRIG 48 07/21/2018    TRIG 115 02/07/2017 TRIG 89 12/05/2015     Lab Results   Component Value Date    HDL 50 02/08/2020    HDL 52 07/21/2018    HDL 54 02/07/2017     Lab Results   Component Value Date    LDLCHOLESTEROL 129 02/08/2020    LDLCHOLESTEROL 103 07/21/2018    LDLCHOLESTEROL 120 02/07/2017     Lab Results   Component Value Date    CHOLHDLRATIO 4.1 02/08/2020    CHOLHDLRATIO 3.2 07/21/2018    CHOLHDLRATIO 3.6 02/07/2017     Lab Results   Component Value Date    LABA1C 5.3 02/08/2020       Lab Results   Component Value Date    VITD25 24.7 (L) 02/08/2020     ASSESSMENT AND PLAN  1. Shift work sleep disorder  Failure to Improve  Continue current routine or therapy. DISCUSSED AND ADVISED TO:  Cut down intake of drinks with caffeine, such as coffee or black tea, for 8 hours before bed. Cut down on smoking or use other types of tobacco near bedtime. Cut down on Nicotine and alcohol   Stop eating a big meal close to bedtime. Stop drinking a lot of  fluids close to bedtime.      - modafinil (PROVIGIL) 100 MG tablet; Take 1 tablet by mouth daily for 30 days. Dispense: 30 tablet; Refill: 0    2. Adjustment insomnia  Failure to Improve  Continue current routine or therapy. DISCUSSED AND ADVISED TO:  Cut down intake of drinks with caffeine, such as coffee or black tea, for 8 hours before bed. Cut down on smoking or use other types of tobacco near bedtime. Cut down on Nicotine and alcohol   Stop eating a big meal close to bedtime. Stop drinking a lot of  fluids close to bedtime.      - melatonin ER 1 MG TBCR tablet; Take 1 tablet by mouth nightly as needed (insomnia)  Dispense: 30 tablet; Refill: 2    3. Constipation, unspecified constipation type  Failure to Improve  Continue therapy. DISCUSSED and ADVISED TO:  Drink plenty of fluids, 1.5 to 2 liters a day  Increase high-fiber foods  with 25-30 g each day. These include fruits, vegetables, beans, and whole grains. Get at least 30 minutes of exercise on most days of the week.    Take a fiber supplement, such as Citrucel (Methylcellulose fiber) or Metamucil (Psyllium), every day. Schedule time each day for a bowel movement. - Wheat Dextrin (BENEFIBER ON THE GO) POWD; Take 1 Package by mouth daily Mix with 8 ounce drink daily. Dispense: 100 each; Refill: 3    4. Vitamin D deficiency  Continue Vitamin D supplementation  DISCUSSED AND ADVISED TO:  Foods that contain a lot of vitamin D includes [place], tuna, and mackerel. Cheese, egg yolks, and beef liver have small amounts of vit D.  Milk, soy drinks, orange juice, yogurt, margarine, and some kinds of cereal have vitamin D added to them. Continue to use sunblock when out in the sun to prevent skin cancer.  - Vitamin D 25 Hydroxy; Future    5. Elevated LDL cholesterol level  Failure to Improve  Continue therapy. Advised to decrease the consumption of red meats, fried foods, trans fats, sweets, sugary beverages. Advised to increase fish, vegetables, and fruits consumption. Advised to add fiber or OTC supplements in diet. Discussed weight loss which will result in improvement of lipids levels. Advised to increase daily physical activities and add regular exercises. - Lipid, Fasting; Future    6. Obesity, Class I, BMI 30-34.9  BMI increasing  DISCUSSED AND ADVISED TO:  Eat a low-fat and low carbohydrates diet. Avoid fried foods especially fast food. Choose healthier options for snacks. Have 5-6 servings of fruits and vegetables per day. Cut down on eating processed food. Add 30 minutes to 1 hour aerobic exercise for 3-4 days a week. 7. Hyperglycemia  Ongoing   Evaluate for metabolic disorders   and or vitamin deficiencies. - CBC Auto Differential; Future  - Hemoglobin A1C; Future  - Comprehensive Metabolic Panel, Fasting; Future  - Urinalysis Reflex to Culture; Future    8. Screening for viral disease  recommended    - Hepatitis C Antibody; Future    9. Screening for prostate cancer  recommended    - PSA Screening;  Future     Low carb, low fat diet, increase fruits and vegetables, and exercise 4-5 times a week 30-40 minutes a day, or walk 1-2 hours per day, or wear a pedometer and get at least 10,000 steps per day. Dental exam 2-3 times /year advised. Immunizations reviewed. Health Maintenance reviewed - declined flu, duel for annual labs. Orders Placed This Encounter   Procedures    Hepatitis C Antibody     Standing Status:   Future     Standing Expiration Date:   8/5/2022    CBC Auto Differential     Standing Status:   Future     Standing Expiration Date:   8/5/2022    Hemoglobin A1C     Standing Status:   Future     Standing Expiration Date:   8/5/2022    Comprehensive Metabolic Panel, Fasting     Standing Status:   Future     Standing Expiration Date:   8/5/2022    Lipid, Fasting     Standing Status:   Future     Standing Expiration Date:   8/5/2022    Urinalysis Reflex to Culture     Standing Status:   Future     Standing Expiration Date:   8/5/2022     Order Specific Question:   SPECIFY(EX-CATH,MIDSTREAM,CYSTO,ETC)? Answer:   midstream    Vitamin D 25 Hydroxy     Standing Status:   Future     Standing Expiration Date:   8/5/2022    PSA Screening     Standing Status:   Future     Standing Expiration Date:   8/8/2022     Medications Discontinued During This Encounter   Medication Reason    nystatin (MYCOSTATIN) 184796 UNIT/GM powder LIST CLEANUP    clotrimazole-betamethasone (LOTRISONE) 1-0.05 % cream LIST CLEANUP    melatonin ER 1 MG TBCR tablet REORDER     Thepatient's past medical, surgical, social, and family history as well as his   current medications and allergies were reviewed as documented in today's encounter. Medications, labs, diagnostic studies,consultations and follow-up as documented in this encounter. Return in about 6 months (around 8/8/2021) for Chronic conditions, 30mins.   Future Appointments   Date Time Provider Ruddy Sim   6/14/2021  4:15 PM PEPE Patterson - [facility] [address] [facility]     This note was completed by using the assistance of a speech-recognition program. However, inadvertent computerized transcription errors may be present. Although every effort wasmade to ensure accuracy, no guarantees can be provided that every mistake has been identified and corrected by editing.   Electronically signed by PEPE Lopez CNP on 2/8/2021 at 7:26 PM

## 2021-02-08 ENCOUNTER — OFFICE VISIT (OUTPATIENT)
Dept: FAMILY MEDICINE CLINIC | Age: 41
End: 2021-02-08
Payer: COMMERCIAL

## 2021-02-08 VITALS
OXYGEN SATURATION: 98 % | TEMPERATURE: 97.3 F | HEIGHT: 69 IN | HEART RATE: 82 BPM | DIASTOLIC BLOOD PRESSURE: 82 MMHG | WEIGHT: 223 LBS | SYSTOLIC BLOOD PRESSURE: 118 MMHG | BODY MASS INDEX: 33.03 KG/M2

## 2021-02-08 DIAGNOSIS — E55.9 VITAMIN D DEFICIENCY: ICD-10-CM

## 2021-02-08 DIAGNOSIS — E66.9 OBESITY, CLASS I, BMI 30-34.9: ICD-10-CM

## 2021-02-08 DIAGNOSIS — E78.00 ELEVATED LDL CHOLESTEROL LEVEL: ICD-10-CM

## 2021-02-08 DIAGNOSIS — Z11.59 SCREENING FOR VIRAL DISEASE: ICD-10-CM

## 2021-02-08 DIAGNOSIS — Z12.5 SCREENING FOR PROSTATE CANCER: ICD-10-CM

## 2021-02-08 DIAGNOSIS — K59.00 CONSTIPATION, UNSPECIFIED CONSTIPATION TYPE: ICD-10-CM

## 2021-02-08 DIAGNOSIS — F51.02 ADJUSTMENT INSOMNIA: ICD-10-CM

## 2021-02-08 DIAGNOSIS — R73.9 HYPERGLYCEMIA: ICD-10-CM

## 2021-02-08 DIAGNOSIS — G47.26 SHIFT WORK SLEEP DISORDER: Primary | ICD-10-CM

## 2021-02-08 PROCEDURE — 99212 OFFICE O/P EST SF 10 MIN: CPT | Performed by: FAMILY MEDICINE

## 2021-02-08 PROCEDURE — 99396 PREV VISIT EST AGE 40-64: CPT | Performed by: FAMILY MEDICINE

## 2021-02-08 PROCEDURE — 81003 URINALYSIS AUTO W/O SCOPE: CPT | Performed by: FAMILY MEDICINE

## 2021-02-08 RX ORDER — WHEAT DEXTRIN 3 G/4 G
1 POWDER IN PACKET (EA) ORAL DAILY
Qty: 100 EACH | Refills: 3 | Status: SHIPPED | OUTPATIENT
Start: 2021-02-08 | End: 2021-03-10

## 2021-02-08 RX ORDER — MODAFINIL 100 MG/1
100 TABLET ORAL DAILY
Qty: 30 TABLET | Refills: 0 | Status: SHIPPED | OUTPATIENT
Start: 2021-02-08 | End: 2021-03-10

## 2021-02-08 ASSESSMENT — PATIENT HEALTH QUESTIONNAIRE - PHQ9
SUM OF ALL RESPONSES TO PHQ9 QUESTIONS 1 & 2: 1
SUM OF ALL RESPONSES TO PHQ QUESTIONS 1-9: 1
SUM OF ALL RESPONSES TO PHQ QUESTIONS 1-9: 1
1. LITTLE INTEREST OR PLEASURE IN DOING THINGS: 1
SUM OF ALL RESPONSES TO PHQ QUESTIONS 1-9: 1

## 2021-02-08 NOTE — PATIENT INSTRUCTIONS
Schedule a Vaccine  If you qualify for the vaccine as a Phase 1B recipient, call the [facility]) COVID-19 Vaccination Hotline to schedule your appointment or to get additional information about the [facility]) locations which are offering the COVID-19 vaccine. To be 94% effective, it's important that you receive two doses of one of the COVID-19 vaccines. -If you are receiving the Mayberry Peter vaccine, your second shot will be scheduled as close to 21 days after the first shot as possible. -If you are receiving the Moderna vaccine, your second shot will be scheduled as close to 28 days after the first shot as possible. Call [phone]      Links to [facility]) website and [facility] website      [facility]-monitoring-coronavirus-covid-19/covid-19-vaccine/[place]-vaccine    [email or web address]  Patient Education        Lynnda Puls: Instrucciones de cuidado  Narcolepsy: Care Instructions  Instrucciones de cuidado  A todo el kristina le da un poco de sueño de vez en cuando, en viajes largos en automóviles u otros momentos en los que desea permanecer alerta. Sin embargo, algunas personas no pueden controlar mosquera somnolencia. No es para nada divertido estar a mitad del día de [place] o conduciendo un vehículo y Ebbie Rolling necesidad imperiosa de dormir. A esta afección médica se le llama narcolepsia. Los médicos no conocen la causa de la narcolepsia. Mosquera médico le podría pedir que lleve un diario del sueño branden un par de semanas. Ollis Mena a usted y a mosquera médico a decidirse por un tratamiento. Suele ayudar dormir siestas breves branden el día. Y estas cosas podrían ayudarle a dormir mejor por la noche: crear un buen espacio para dormir, hacer cosas que le mejoran el Mando de ánimo antes de acostarse y mantener un horario regular para dormir. Neal médico podría recomendar medicamentos para ayudarle a permanecer despierto branden el día o dormir branden la noche. La atención de seguimiento es nuria parte clave de neal tratamiento y seguridad. Asegúrese de hacer y acudir a todas las citas, y llame a neal médico si está teniendo problemas. También es nuria buena idea saber los resultados de brennan exámenes y mantener nuria lista de los medicamentos que demetria. ¿Cómo puede cuidarse en el hogar? · Trate de dormir 2 o 3 siestas breves, en horarios regulares, branden el día. Después de nuria siesta, siempre dese tiempo para despertarse por completo antes de conducir un automóvil o hacer cualquier cosa que pudiera causar un accidente. · Avant los medicamentos exactamente según las indicaciones. Llame a neal médico si anca estar teniendo un problema con neal medicamento. Podría ser necesario que pruebe distintos medicamentos antes de encontrar el que le funcione mejor. · Intente mejorar brennan hábitos de sueño nocturno. Las siguientes son algunas de las cosas que puede hacer:  ? Váyase a la cama solo cuando tenga sueño y levántese a la misma hora todos los días aun cuando sienta que no fortune descansado. [place] podría ayudarle a dormir joan la noche siguiente y la que sigue. ? Si permanece despierto después de Cassius Julio de 15 minutos, levántese, salga de la habitación y nicole alguna actividad tranquila, sheron leer, hasta que sienta sueño nuevamente. ? Evite consumir bebidas o comidas que contengan cafeína después de las 3 p.m. [place] incluye café, té, bebidas cola y chocolate. ? Asegúrese de [employer] neal habitación a nuria temperatura que no sea demasiado krista ni demasiado baja, y [place] y [place] ? Asegúrese de que neal colchón le proporcione un buen apoyo. · Sea valdez con neal cuerpo:  ? Alivie la tensión con ejercicio o masajes. ? Alexus Barrientos y practique técnicas de relajación. ? Evite el alcohol, la cafeína, la nicotina y las drogas [address] Estas sustancias pueden aumentar los niveles de ansiedad y ocasionarle problemas para dormir. · Nicole ejercicios Pitney Salas. Hacer estiramientos y ejercicios aeróbicos suaves, nadar, caminar y montar en bicicleta pueden ayudarle a pasar el día y dormir joan branden la noche. · Siga nuria dieta saludable. Es posible que se sienta mejor si gagan las comidas pesadas y si come más frutas y verduras. · No utilice pastillas para dormir de [employer]. Pueden provocarle un sueño intranquilo. · Pregúntele a mosquera médico si cualquier medicamento que demetria pudiera causar somnolencia. Por ejemplo, los medicamentos para combatir los resfriados y las alergias pueden causar somnolencia. · Considere unirse a un senait de apoyo con personas que tengan narcolepsia u otros trastornos del sueño. Estos grupos pueden ser nuria buena mya de consejos sobre lo que se puede hacer. También puede ser reconfortante hablar con personas que se enfrentan a retos similares. Mosquera médico puede decirle cómo comunicarse con un senait de apoyo. ¿Cuándo debe pedir ayuda? Llame a mosquera médico ahora mismo o busque atención médica inmediata si:    · Se desmayó (perdió el conocimiento).     · No puede utilizar los músculos. [place] podría ocurrir por un momento, a veces después de reírse o Negrito Edman, y podría afectar únicamente nuria parte de mosquera cuerpo. Preste especial atención a los cambios en mosquera sangeeta y asegúrese de comunicarse con mosquera médico si:    · Mosquera somnolencia continúa empeorando. ¿Dónde puede encontrar más información en inglés? Maribel Crane a [email or web address] e ingrese a mosquera cuenta de MyChartAretha Catherine R488 en el Nirav Pickup \"Search Health Information\" para más información (en inglés) sobre \"Narcolepsia: Instrucciones de cuidado. \"     Si no tiene nuria cuenta, nicole elizabeth en el enlace \"Sign Up Now\".   Revisado: 24 febrero, 2020               Versión del contenido: 12.6 © 6682-7926 [employer]wise, [employer]. Las instrucciones de cuidado fueron adaptadas bajo licencia por [employer] [facility] ([facility]). Si usted tiene Allyn Roslyn afección médica o sobre estas instrucciones, siempre pregunte a mosquera profesional de sangeeta. [employer], [employer] niega toda garantía o responsabilidad por mosquera uso de esta información.

## 2021-02-09 ENCOUNTER — HOSPITAL ENCOUNTER (OUTPATIENT)
Age: 41
Discharge: HOME | End: 2021-02-09
Payer: COMMERCIAL

## 2021-02-09 ENCOUNTER — TELEPHONE (OUTPATIENT)
Dept: FAMILY MEDICINE CLINIC | Age: 41
End: 2021-02-09

## 2021-02-09 DIAGNOSIS — E78.2 MIXED HYPERLIPIDEMIA: Primary | ICD-10-CM

## 2021-02-09 DIAGNOSIS — Z11.59 SCREENING FOR VIRAL DISEASE: ICD-10-CM

## 2021-02-09 DIAGNOSIS — E78.00 ELEVATED LDL CHOLESTEROL LEVEL: ICD-10-CM

## 2021-02-09 DIAGNOSIS — E55.9 VITAMIN D DEFICIENCY: ICD-10-CM

## 2021-02-09 DIAGNOSIS — R73.9 HYPERGLYCEMIA: ICD-10-CM

## 2021-02-09 DIAGNOSIS — Z12.5 SCREENING FOR PROSTATE CANCER: ICD-10-CM

## 2021-02-09 LAB
-: ABNORMAL
ABSOLUTE EOS #: 0.2 K/UL (ref 0–0.4)
ABSOLUTE IMMATURE GRANULOCYTE: ABNORMAL K/UL (ref 0–0.3)
ABSOLUTE LYMPH #: 2.1 K/UL (ref 1–4.8)
ABSOLUTE MONO #: 0.6 K/UL (ref 0.1–1.3)
ALBUMIN SERPL-MCNC: 4.2 G/DL (ref 3.5–5.2)
ALBUMIN/GLOBULIN RATIO: ABNORMAL (ref 1–2.5)
ALP BLD-CCNC: 80 U/L (ref 40–129)
ALT SERPL-CCNC: 31 U/L (ref 5–41)
AMORPHOUS: ABNORMAL
ANION GAP SERPL CALCULATED.3IONS-SCNC: 8 MMOL/L (ref 9–17)
AST SERPL-CCNC: 25 U/L
BACTERIA: ABNORMAL
BASOPHILS # BLD: 1 % (ref 0–2)
BASOPHILS ABSOLUTE: 0.1 K/UL (ref 0–0.2)
BILIRUB SERPL-MCNC: 0.44 MG/DL (ref 0.3–1.2)
BILIRUBIN URINE: NEGATIVE
BUN BLDV-MCNC: 16 MG/DL (ref 6–20)
BUN/CREAT BLD: ABNORMAL (ref 9–20)
CALCIUM SERPL-MCNC: 9.3 MG/DL (ref 8.6–10.4)
CASTS UA: ABNORMAL /LPF
CHLORIDE BLD-SCNC: 104 MMOL/L (ref 98–107)
CHOLESTEROL, FASTING: 229 MG/DL
CHOLESTEROL/HDL RATIO: 4.7
CO2: 28 MMOL/L (ref 20–31)
COLOR: YELLOW
COMMENT UA: ABNORMAL
CREAT SERPL-MCNC: 0.71 MG/DL (ref 0.7–1.2)
CRYSTALS, UA: ABNORMAL /HPF
DIFFERENTIAL TYPE: ABNORMAL
EOSINOPHILS RELATIVE PERCENT: 3 % (ref 0–4)
EPITHELIAL CELLS UA: ABNORMAL /HPF
ESTIMATED AVERAGE GLUCOSE: 111 MG/DL
GFR AFRICAN AMERICAN: >60 ML/MIN
GFR NON-AFRICAN AMERICAN: >60 ML/MIN
GFR SERPL CREATININE-BSD FRML MDRD: ABNORMAL ML/MIN/{1.73_M2}
GFR SERPL CREATININE-BSD FRML MDRD: ABNORMAL ML/MIN/{1.73_M2}
GLUCOSE FASTING: 98 MG/DL (ref 70–99)
GLUCOSE URINE: NEGATIVE
HBA1C MFR BLD: 5.5 % (ref 4–6)
HCT VFR BLD CALC: 46 % (ref 41–53)
HDLC SERPL-MCNC: 49 MG/DL
HEMOGLOBIN: 15.6 G/DL (ref 13.5–17.5)
HEPATITIS C ANTIBODY: NONREACTIVE
IMMATURE GRANULOCYTES: ABNORMAL %
KETONES, URINE: NEGATIVE
LDL CHOLESTEROL: 144 MG/DL (ref 0–130)
LEUKOCYTE ESTERASE, URINE: NEGATIVE
LYMPHOCYTES # BLD: 30 % (ref 24–44)
MCH RBC QN AUTO: 30.4 PG (ref 26–34)
MCHC RBC AUTO-ENTMCNC: 33.8 G/DL (ref 31–37)
MCV RBC AUTO: 89.8 FL (ref 80–100)
MONOCYTES # BLD: 8 % (ref 1–7)
MUCUS: ABNORMAL
NITRITE, URINE: NEGATIVE
NRBC AUTOMATED: ABNORMAL PER 100 WBC
OTHER OBSERVATIONS UA: ABNORMAL
PDW BLD-RTO: 12.9 % (ref 11.5–14.9)
PH UA: 6.5 (ref 5–8)
PLATELET # BLD: 314 K/UL (ref 150–450)
PLATELET ESTIMATE: ABNORMAL
PMV BLD AUTO: 7.8 FL (ref 6–12)
POTASSIUM SERPL-SCNC: 4 MMOL/L (ref 3.7–5.3)
PROSTATE SPECIFIC ANTIGEN: 1.38 UG/L
PROTEIN UA: NEGATIVE
RBC # BLD: 5.12 M/UL (ref 4.5–5.9)
RBC # BLD: ABNORMAL 10*6/UL
RBC UA: ABNORMAL /HPF
RENAL EPITHELIAL, UA: ABNORMAL /HPF
SEG NEUTROPHILS: 58 % (ref 36–66)
SEGMENTED NEUTROPHILS ABSOLUTE COUNT: 4 K/UL (ref 1.3–9.1)
SODIUM BLD-SCNC: 140 MMOL/L (ref 135–144)
SPECIFIC GRAVITY UA: 1.02 (ref 1–1.03)
TOTAL PROTEIN: 7.5 G/DL (ref 6.4–8.3)
TRICHOMONAS: ABNORMAL
TRIGLYCERIDE, FASTING: 181 MG/DL
TURBIDITY: ABNORMAL
URINE HGB: NEGATIVE
UROBILINOGEN, URINE: NORMAL
VITAMIN D 25-HYDROXY: 23.5 NG/ML (ref 30–100)
VLDLC SERPL CALC-MCNC: ABNORMAL MG/DL (ref 1–30)
WBC # BLD: 7 K/UL (ref 3.5–11)
WBC # BLD: ABNORMAL 10*3/UL
WBC UA: ABNORMAL /HPF
YEAST: ABNORMAL

## 2021-02-09 PROCEDURE — 80061 LIPID PANEL: CPT

## 2021-02-09 PROCEDURE — 81001 URINALYSIS AUTO W/SCOPE: CPT

## 2021-02-09 PROCEDURE — 85025 COMPLETE CBC W/AUTO DIFF WBC: CPT

## 2021-02-09 PROCEDURE — G0103 PSA SCREENING: HCPCS

## 2021-02-09 PROCEDURE — 36415 COLL VENOUS BLD VENIPUNCTURE: CPT

## 2021-02-09 PROCEDURE — 80053 COMPREHEN METABOLIC PANEL: CPT

## 2021-02-09 PROCEDURE — 86803 HEPATITIS C AB TEST: CPT

## 2021-02-09 PROCEDURE — 83036 HEMOGLOBIN GLYCOSYLATED A1C: CPT

## 2021-02-09 PROCEDURE — 82306 VITAMIN D 25 HYDROXY: CPT

## 2021-02-09 RX ORDER — PRAVASTATIN SODIUM 20 MG
20 TABLET ORAL DAILY
Qty: 90 TABLET | Refills: 2 | Status: SHIPPED | OUTPATIENT
Start: 2021-02-09 | End: 2021-06-21 | Stop reason: ALTCHOICE

## 2021-02-09 RX ORDER — UREA 10 %
1 LOTION (ML) TOPICAL NIGHTLY
COMMUNITY
Start: 2021-02-08 | End: 2021-06-21 | Stop reason: SDUPTHER

## 2021-02-10 NOTE — TELEPHONE ENCOUNTER
Please let the patient know that his  cholesterol levels were elevated. I sent a prescription for pravastatin to help improve his  cholesterol level. Please advise the patient to:  Cut down on red meats and trans fats in their diet. Increase physical activity. Add some regular exercise at least 3 times a week on their routine  Try to lose weight to help improve their cholesterol levels. No hepatitis    Blood count, liver and kidney functions were good. Prostate cancer screening normal.    Vitamin d still low. Continue supplements. Urinalysis normal- but drink more water. Diabetes screening still below prediabetes.         Lab Results   Component Value Date    WBC 7.0 02/09/2021    HGB 15.6 02/09/2021    HCT 46.0 02/09/2021    MCV 89.8 02/09/2021     02/09/2021     Lab Results   Component Value Date     02/09/2021    K 4.0 02/09/2021     02/09/2021    CO2 28 02/09/2021    BUN 16 02/09/2021    CREATININE 0.71 02/09/2021    GLUCOSE 114 07/21/2018    CALCIUM 9.3 02/09/2021      Lab Results   Component Value Date    ALT 31 02/09/2021    AST 25 02/09/2021    ALKPHOS 80 02/09/2021    BILITOT 0.44 02/09/2021     Lab Results   Component Value Date    TSH 0.65 02/08/2020     Lab Results   Component Value Date    CHOL 165 07/21/2018    CHOL 197 02/07/2017    CHOL 197 12/05/2015     Lab Results   Component Value Date    TRIG 48 07/21/2018    TRIG 115 02/07/2017    TRIG 89 12/05/2015     Lab Results   Component Value Date    HDL 49 02/09/2021    HDL 50 02/08/2020    HDL 52 07/21/2018     Lab Results   Component Value Date    LDLCHOLESTEROL 144 (H) 02/09/2021    LDLCHOLESTEROL 129 02/08/2020    LDLCHOLESTEROL 103 07/21/2018     Lab Results   Component Value Date    VLDL NOT REPORTED (H) 02/09/2021    VLDL NOT REPORTED 02/08/2020    VLDL NOT REPORTED 07/21/2018     Lab Results   Component Value Date    CHOLHDLRATIO 4.7 02/09/2021    CHOLHDLRATIO 4.1 02/08/2020    CHOLHDLRATIO 3.2 07/21/2018 Lab Results   Component Value Date    LABA1C 5.5 02/09/2021       Lab Results   Component Value Date    VITD25 23.5 (L) 02/09/2021

## 2021-06-18 NOTE — PROGRESS NOTES
CONSTIPATION  Patient still c/o constipation. He tries some metamucil with no success. He c/o increase gassiness/bloatingness. Patient had colonoscopy when he was 26 due to constipation. No polyps, diverticulosis. Will change metamucil to benefiber. Encouraged tp drink more water. VITAMIN D  Patient has a known Vitamin D Deficiency. he had a course of supplementation for 12 weeks. he is due to get levels check. We continue to discuss ways to manage this condition. We also discussed ways to improve the levels. Such as learning food groups that are high in Vitamin D. We also discuss that sun exposure is beneficial however, too much sun and sun damage can potentially result in skin cancer. Lab Results   Component Value Date/Time    VITD25 23.5 (L) 02/09/2021 09:56 AM      OBESITY  Patient's BMI is Body mass index is 32.19 kg/m². kg/m2. BMI is increasing. Patient understands that this condition increases the patient's risk for chronic conditions. Patient advised to practice healthy eating habits. Diet should include plenty of fruits, vegetables, whole grains, lean protein, and low-fat dairy. Increase water consumption. Reduce consumption of dietary sugar and sugar-sweetened beverages. Increasing physical exercises at least 3-4 times a week. We will monitor progression of condition. VITAL SIGNS:  Vitals:    06/21/21 1454   BP: 110/70   Pulse: 70   Temp: 97.7 °F (36.5 °C)   SpO2: 97%   Weight: 218 lb (98.9 kg)   Height: 5' 9\" (1.753 m)   Estimated body mass index is 32.19 kg/m² as calculated from the following:    Height as of this encounter: 5' 9\" (1.753 m). Weight as of this encounter: 218 lb (98.9 kg). Review of Systems   Constitutional: Negative for chills and fever. HENT: Negative for congestion, ear pain, sore throat and trouble swallowing. Eyes: Negative for pain and visual disturbance. Respiratory: Negative. Negative for apnea, cough, chest tightness and shortness of breath. Cardiovascular: Negative. Gastrointestinal: Positive for constipation. Negative for abdominal pain, diarrhea, nausea and vomiting. Endocrine: Negative for cold intolerance and heat intolerance. Genitourinary: Negative for difficulty urinating, dysuria, frequency and genital sores. Musculoskeletal: Negative for arthralgias, gait problem and myalgias. Skin: Negative for color change and rash. Neurological: Negative for weakness, light-headedness and headaches. Psychiatric/Behavioral: Negative for agitation, behavioral problems, decreased concentration, sleep disturbance and suicidal ideas. The patient is nervous/anxious. Physical Exam  Vitals and nursing note reviewed. Constitutional:       Appearance: He is well-developed. He is obese. HENT:      Head: Normocephalic. Right Ear: External ear normal.      Left Ear: External ear normal.      Nose: Nose normal.   Eyes:      General: No scleral icterus. Conjunctiva/sclera: Conjunctivae normal.      Pupils: Pupils are equal, round, and reactive to light. Neck:      Thyroid: No thyromegaly. Vascular: No JVD. Cardiovascular:      Rate and Rhythm: Normal rate and regular rhythm. Heart sounds: Normal heart sounds. No murmur heard. No friction rub. No gallop. Pulmonary:      Effort: Pulmonary effort is normal. No respiratory distress. Breath sounds: Normal breath sounds. No wheezing or rales. Abdominal:      General: Abdomen is protuberant. Bowel sounds are normal. There is no distension. Palpations: Abdomen is soft. Tenderness: There is no abdominal tenderness. There is no guarding or rebound. Hernia: No hernia is present. Musculoskeletal:         General: No tenderness. Normal range of motion. Cervical back: Normal range of motion and neck supple. Lymphadenopathy:      Cervical: No cervical adenopathy. Skin:     General: Skin is warm and dry.       Capillary Refill: Capillary refill takes less than 2 seconds. Findings: No rash. Neurological:      Mental Status: He is alert and oriented to person, place, and time. Sensory: No sensory deficit. Coordination: Coordination normal.      Comments: Varicose veins both legs   Psychiatric:         Mood and Affect: Mood is anxious. Speech: Speech is not rapid and pressured. Behavior: Behavior normal.         Thought Content: Thought content normal. Thought content does not include homicidal or suicidal ideation. Judgment: Judgment normal.         MEDICAL HISTORY      Diagnosis Date    GERD (gastroesophageal reflux disease)     Irritable bowel syndrome       MEDICATIONS  Prior to Visit Medications    Medication Sig Taking? Authorizing Provider   pitavastatin (LIVALO) 1 MG TABS tablet Take 1 tablet by mouth nightly Yes PEPE Patterson CNP   melatonin 1 MG tablet Take 1 tablet by mouth nightly Yes PEPE Patterson CNP   Wheat Dextrin (BENEFIBER ON THE GO) POWD Take 1 Package by mouth daily Mix with 8 ounce drink daily. Yes PEPE Patterson CNP   Cholecalciferol (VITAMIN D3) 25 MCG (1000 UT) TABS TAKE 1 TABLET BY MOUTH DAILY Yes PEPE Patterson CNP       ASSESSMENT/PLAN:  1. Shift work sleep disorder  Improving  Continue melatonin  Continue to monitor    - melatonin 1 MG tablet; Take 1 tablet by mouth nightly  Dispense: 180 tablet; Refill: 1    2. Mixed hyperlipidemia  Stable  Continue therapy. Advised to decrease the consumption of red meats, fried foods, trans fats, sweets, sugary beverages. Advised to increase fish, vegetables, and fruits consumption. Advised to add fiber or OTC supplements in diet. Discussed weight loss which will result in improvement of lipids levels. Advised to increase daily physical activities and add regular exercises. - pitavastatin (LIVALO) 1 MG TABS tablet; Take 1 tablet by mouth nightly  Dispense: 90 tablet; Refill: 1    3.  Adjustment insomnia have spent 25 minutes reviewing previous notes, test results and face to face with the patient discussing the diagnosis and importance of compliance with the treatment plan as well as documenting on the day of the visit. This note was completed by using the assistance of a speech-recognition program. However, inadvertent computerized transcription errors may be present. Although every effort was made to ensure accuracy, no guarantees can be provided that every mistake has been identified and corrected by editing.   Electronically signed by PEPE Keyes CNP on 6/17/21 at 9:57 PM EDT     --PEPE Keyes CNP

## 2021-06-21 ENCOUNTER — OFFICE VISIT (OUTPATIENT)
Dept: FAMILY MEDICINE CLINIC | Age: 41
End: 2021-06-21
Payer: COMMERCIAL

## 2021-06-21 VITALS
HEART RATE: 70 BPM | BODY MASS INDEX: 32.29 KG/M2 | WEIGHT: 218 LBS | TEMPERATURE: 97.7 F | DIASTOLIC BLOOD PRESSURE: 70 MMHG | OXYGEN SATURATION: 97 % | SYSTOLIC BLOOD PRESSURE: 110 MMHG | HEIGHT: 69 IN

## 2021-06-21 DIAGNOSIS — E78.2 MIXED HYPERLIPIDEMIA: ICD-10-CM

## 2021-06-21 DIAGNOSIS — K59.04 CHRONIC IDIOPATHIC CONSTIPATION: ICD-10-CM

## 2021-06-21 DIAGNOSIS — F51.02 ADJUSTMENT INSOMNIA: ICD-10-CM

## 2021-06-21 DIAGNOSIS — E55.9 VITAMIN D DEFICIENCY: ICD-10-CM

## 2021-06-21 DIAGNOSIS — E66.9 CLASS 1 OBESITY WITH SERIOUS COMORBIDITY AND BODY MASS INDEX (BMI) OF 32.0 TO 32.9 IN ADULT, UNSPECIFIED OBESITY TYPE: ICD-10-CM

## 2021-06-21 DIAGNOSIS — G47.26 SHIFT WORK SLEEP DISORDER: Primary | ICD-10-CM

## 2021-06-21 PROCEDURE — 99214 OFFICE O/P EST MOD 30 MIN: CPT | Performed by: FAMILY MEDICINE

## 2021-06-21 RX ORDER — WHEAT DEXTRIN 3 G/4 G
1 POWDER IN PACKET (EA) ORAL DAILY
Qty: 100 EACH | Refills: 3 | Status: SHIPPED | OUTPATIENT
Start: 2021-06-21 | End: 2021-07-21

## 2021-06-21 RX ORDER — UREA 10 %
1 LOTION (ML) TOPICAL NIGHTLY
Qty: 180 TABLET | Refills: 1 | Status: SHIPPED | OUTPATIENT
Start: 2021-06-21

## 2021-06-21 SDOH — ECONOMIC STABILITY: FOOD INSECURITY: WITHIN THE PAST 12 MONTHS, THE FOOD YOU BOUGHT JUST DIDN'T LAST AND YOU DIDN'T HAVE MONEY TO GET MORE.: NEVER TRUE

## 2021-06-21 SDOH — ECONOMIC STABILITY: FOOD INSECURITY: WITHIN THE PAST 12 MONTHS, YOU WORRIED THAT YOUR FOOD WOULD RUN OUT BEFORE YOU GOT MONEY TO BUY MORE.: NEVER TRUE

## 2021-06-21 ASSESSMENT — ENCOUNTER SYMPTOMS
DIARRHEA: 0
COLOR CHANGE: 0
EYE PAIN: 0
SHORTNESS OF BREATH: 0
CHEST TIGHTNESS: 0
CONSTIPATION: 1
NAUSEA: 0
TROUBLE SWALLOWING: 0
VOMITING: 0
COUGH: 0
RESPIRATORY NEGATIVE: 1
ABDOMINAL PAIN: 0
APNEA: 0
SORE THROAT: 0

## 2021-06-21 ASSESSMENT — SOCIAL DETERMINANTS OF HEALTH (SDOH): HOW HARD IS IT FOR YOU TO PAY FOR THE VERY BASICS LIKE FOOD, HOUSING, MEDICAL CARE, AND HEATING?: NOT VERY HARD

## 2021-06-21 NOTE — PROGRESS NOTES
Visit Information    Have you changed or started any medications since your last visit including any over-the-counter medicines, vitamins, or herbal medicines? no   Are you having any side effects from any of your medications? -  no  Have you stopped taking any of your medications? Is so, why? -  no    Have you seen any other physician or provider since your last visit? No  Have you had any other diagnostic tests since your last visit? No  Have you been seen in the emergency room and/or had an admission to a hospital since we last saw you? No  Have you had your routine dental cleaning in the past 6 months? yes     Have you activated your [employer] account? If not, what are your barriers?  Yes     Patient Care Team:  PEPE Claros CNP as PCP - General (Family Medicine)  PEPE Claros CNP as PCP - [facility] Provider    Medical History Review  Past Medical, Family, and Social History reviewed and does contribute to the patient presenting condition    Health Maintenance   Topic Date Due    COVID-19 Vaccine (1) Never done    Flu vaccine (Season Ended) 02/08/2022 (Originally 9/1/2021)    Lipid screen  02/09/2022    Diabetes screen  02/09/2024    DTaP/Tdap/Td vaccine (3 - Td or Tdap) 12/28/2030    Hepatitis C screen  Completed    HIV screen  Completed    Hepatitis A vaccine  Aged Out    Hepatitis B vaccine  Aged Out    Hib vaccine  Aged Out    Meningococcal (ACWY) vaccine  Aged Out    Pneumococcal 0-64 years Vaccine  Aged Out

## 2021-06-21 NOTE — PATIENT INSTRUCTIONS
Patient Education        Constipation: Care Instructions  Your Care Instructions     Constipation means that you have a hard time passing stools (bowel movements). People pass stools from 3 times a day to once every 3 days. What is normal for you may be different. Constipation may occur with pain in the rectum and cramping. The pain may get worse when you try to pass stools. Sometimes there are small amounts of bright red blood on toilet paper or the surface of stools. This is because of enlarged veins near the rectum (hemorrhoids). A few changes in your diet and lifestyle may help you avoid ongoing constipation. Your doctor may also prescribe medicine to help loosen your stool. Some medicines can cause constipation. These include pain medicines and antidepressants. Tell your doctor about all the medicines you take. Your doctor may want to make a medicine change to ease your symptoms. Follow-up care is a key part of your treatment and safety. Be sure to make and go to all appointments, and call your doctor if you are having problems. It's also a good idea to know your test results and keep a list of the medicines you take. How can you care for yourself at home? · Drink plenty of fluids. If you have kidney, heart, or liver disease and have to limit fluids, talk with your doctor before you increase the amount of fluids you drink. · Include high-fiber foods in your diet each day. These include fruits, vegetables, beans, and whole grains. · Get at least 30 minutes of exercise on most days of the week. Walking is a good choice. You also may want to do other activities, such as running, swimming, cycling, or playing tennis or team sports. · Take a fiber supplement, such as Citrucel or Metamucil, every day. Read and follow all instructions on the label. · Schedule time each day for a bowel movement. A daily routine may help. Take your time having your bowel movement.   · Support your feet with a small step stool when you sit on the toilet. This helps flex your hips and places your pelvis in a squatting position. · Your doctor may recommend an over-the-counter laxative to relieve your constipation. Examples are Milk of Magnesia and MiraLax. Read and follow all instructions on the label. Do not use laxatives on a long-term basis. When should you call for help? Call your doctor now or seek immediate medical care if:    · You have new or worse belly pain.     · You have new or worse nausea or vomiting.     · You have blood in your stools. Watch closely for changes in your health, and be sure to contact your doctor if:    · Your constipation is getting worse.     · You do not get better as expected. Where can you learn more? Go to https://[employer]. org and sign in to your [employer] account. Enter 21 [phone] in the [employer] box to learn more about \"Constipation: Care Instructions. \"     If you do not have an account, please click on the \"Sign Up Now\" link. Current as of: October 19, 2020               Content Version: 12.9  © 1313-2058 [employer]. Care instructions adapted under license by [facility] ([facility]). If you have questions about a medical condition or this instruction, always ask your healthcare professional. [address]  any warranty or liability for your use of this information.

## 2021-12-13 ENCOUNTER — OFFICE VISIT (OUTPATIENT)
Dept: FAMILY MEDICINE CLINIC | Age: 41
End: 2021-12-13
Payer: COMMERCIAL

## 2021-12-13 VITALS
DIASTOLIC BLOOD PRESSURE: 82 MMHG | TEMPERATURE: 97.5 F | SYSTOLIC BLOOD PRESSURE: 123 MMHG | HEART RATE: 81 BPM | OXYGEN SATURATION: 99 %

## 2021-12-13 DIAGNOSIS — R39.9 UTI SYMPTOMS: ICD-10-CM

## 2021-12-13 DIAGNOSIS — M54.50 ACUTE MIDLINE LOW BACK PAIN WITHOUT SCIATICA: Primary | ICD-10-CM

## 2021-12-13 LAB
BILIRUBIN, POC: NORMAL
BLOOD URINE, POC: NORMAL
CLARITY, POC: CLEAR
COLOR, POC: NORMAL
GLUCOSE URINE, POC: NORMAL
KETONES, POC: NORMAL
LEUKOCYTE EST, POC: NORMAL
NITRITE, POC: NORMAL
PH, POC: 6.5
PROTEIN, POC: NORMAL
SPECIFIC GRAVITY, POC: 1.03
UROBILINOGEN, POC: 1

## 2021-12-13 PROCEDURE — 99214 OFFICE O/P EST MOD 30 MIN: CPT | Performed by: FAMILY MEDICINE

## 2021-12-13 PROCEDURE — 96372 THER/PROPH/DIAG INJ SC/IM: CPT | Performed by: FAMILY MEDICINE

## 2021-12-13 PROCEDURE — 81002 URINALYSIS NONAUTO W/O SCOPE: CPT | Performed by: FAMILY MEDICINE

## 2021-12-13 RX ORDER — KETOROLAC TROMETHAMINE 30 MG/ML
60 INJECTION, SOLUTION INTRAMUSCULAR; INTRAVENOUS ONCE
Status: COMPLETED | OUTPATIENT
Start: 2021-12-13 | End: 2021-12-13

## 2021-12-13 RX ORDER — CYCLOBENZAPRINE HCL 10 MG
10 TABLET ORAL 3 TIMES DAILY PRN
Qty: 15 TABLET | Refills: 0 | Status: SHIPPED | OUTPATIENT
Start: 2021-12-13 | End: 2021-12-23

## 2021-12-13 RX ADMIN — KETOROLAC TROMETHAMINE 60 MG: 30 INJECTION, SOLUTION INTRAMUSCULAR; INTRAVENOUS at 10:46

## 2021-12-13 ASSESSMENT — ENCOUNTER SYMPTOMS
VOMITING: 0
DIARRHEA: 0
BACK PAIN: 1
RESPIRATORY NEGATIVE: 1
ABDOMINAL PAIN: 0
BOWEL INCONTINENCE: 0
NAUSEA: 0

## 2021-12-13 NOTE — LETTER
[facility]   [address] [facility] [address] [place] [id number]  Phone: [phone]  Fax: [phone]    Gwyn Yeboah MD        December 13, 2021     Patient: Donte Gutierrez   YOB: 1980   Date of Visit: 12/13/2021       To Whom it May Concern:    Vale Contreras was seen in my clinic on 12/13/2021. He is to be excused from work 12/13/21. If you have any questions or concerns, please don't hesitate to call.     Sincerely,         Gwyn Yeboah MD

## 2021-12-13 NOTE — PATIENT INSTRUCTIONS
Patient Education        Learning About Low Back Pain  What is low back pain? Low back pain is pain that can occur anywhere below the ribs and above the legs. It is very common. Almost everyone has it at one time or another. Low back pain can be:  · Acute. This is new pain that can last a few days to a few weeksat the most a few months. · Chronic. This pain can last for more than a few months. Sometimes it can last for years. What are some myths about low back pain? Here are some common myths about low back painand the facts:  Myth: \"I need to rest my back when I have back pain. \"   Fact: Staying active won't hurt you. It may help you get better faster. Myth: \"I need prescription pain medicine. \"   Fact: It's best to try to let time and being active heal your back. Opioid pain medicinessuch as hydrocodone or oxycodoneusually don't work any better than over-the-counter medicines like ibuprofen or naproxen. And opioids can cause serious problems like opioid use disorder or overdose. Moderate to severe opioid use disorder is sometimes called addiction. Myth: \"I need a test like an X-ray or an MRI to diagnose my low back pain. \"   Fact: Getting a test right away won't help you get better faster. And it could lead you down a treatment path you may not need, since most people get better on their own. What causes low back pain? In most cases, there isn't a clear cause. This can be frustrating, because your back hurts and there's no obvious reason. Your back pain can be caused by:  Overuse or muscle strain. This can happen from playing sports, lifting heavy things, or not being physically fit. A herniated disc. This is a problem with the cushion between the bones in your back. Arthritis. With age, you may have changes in your bones that can narrow the space around your nerves. Other causes. In rare cases, the cause is a serious illness like an infection or cancer.  But there are usually other symptoms too.  What are the symptoms? Back pain can come on quickly or over time. You may feel:  · Pain in your hips or buttock. · Leg pain, numbness, tingling, or weakness. When a nerve gets squeezedsuch as from a disc problem or arthritisyou may have symptoms in your leg or foot. You can even have leg symptoms from a back problem without having any pain in your back. · Pain that's sharp or dull, sometimes with stiffness or muscle spasms. It may be in one small area or over a broad area. But even bad pain doesn't mean that it's caused by something serious. How is low back pain diagnosed? A physical exam is the main way to diagnose low back pain. Your doctor may examine your back, check your nerves by testing your reflexes, and make sure that your muscles are strong. Your doctor also will ask questions about your back and overall health. Most people don't need any tests right away. Tests often don't show the reason for your pain. If your pain lasts more than 6 weeks or you have symptoms that your doctor is more concerned about, then your doctor may order tests. These may include an X-ray, a CT scan, or an MRI. Sometimes other tests such as a bone scan or nerve conduction test may be done. How is low back pain treated? Most acute low back pain gets better on its own within a few weeks, no matter what the cause. Time and doing usual activities are all that most people need to feel better. Using heat or ice and taking over-the-counter pain medicine also can help while your body heals. If you aren't getting better on your own or your pain is very bad, your doctor may recommend:  · Physical therapy. · Spinal manipulation, such as by a chiropractor. · Acupuncture. · Massage. · Injections of steroid medicine in your back (especially for pain that involves your legs). If you have chronic low back pain, treatment will help you understand and manage your pain. Treatment may include:  · Staying active.  This may include walking or doing back exercises. · Physical therapy. · Medicines. Some of these medicines are also used for other problems, like depression. · Pain management. Your doctor may have you see a pain specialist.  · Counseling. Having chronic pain can be hard. It may help to talk to someone who can help you cope with your pain. Surgery isn't needed for most people. But it may help some types of low back pain. Follow-up care is a key part of your treatment and safety. Be sure to make and go to all appointments, and call your doctor if you are having problems. It's also a good idea to know your test results and keep a list of the medicines you take. When should you call for help? Call 911 anytime you think you may need emergency care. For example, call if:  · You can't move a leg at all. Call your doctor now or seek immediate medical care if:  · You have new or worse symptoms in your legs, belly, or buttocks. Symptoms may include:  ? Numbness or tingling. ? Weakness. ? Pain. · You lose bladder or bowel control. Watch closely for changes in your health, and be sure to contact your doctor if:  · Along with the back pain, you have a fever, lose weight, or don't feel well. · You do not get better as expected. Where can you learn more? Go to https://Green MomitshamaLiveOps.WinDensity. org and sign in to your [employer] account. Enter A007 in the [facility] box to learn more about \"Learning About Low Back Pain. \"     If you do not have an account, please click on the \"Sign Up Now\" link. Current as of: July 1, 2021               Content Version: 13.0  © 2264-0478 Healthwise, Incorporated. Care instructions adapted under license by [facility] ([facility] [place]). If you have questions about a medical condition or this instruction, always ask your healthcare professional. [address] any warranty or liability for your use of this information.          Patient Education        Low Back Pain: Exercises  Introduction  Here are some examples of exercises for you to try. The exercises may be suggested for a condition or for rehabilitation. Start each exercise slowly. Ease off the exercises if you start to have pain. You will be told when to start these exercises and which ones will work best for you. How to do the exercises  Press-up    1. Lie on your stomach, supporting your body with your forearms. 2. Press your elbows down into the floor to raise your upper back. As you do this, relax your stomach muscles and allow your back to arch without using your back muscles. As your press up, do not let your hips or pelvis come off the floor. 3. Hold for 15 to 30 seconds, then relax. 4. Repeat 2 to 4 times. Alternate arm and leg (bird dog) exercise    Do this exercise slowly. Try to keep your body straight at all times, and do not let one hip drop lower than the other. 1. Start on the floor, on your hands and knees. 2. Tighten your belly muscles. 3. Raise one leg off the floor, and hold it straight out behind you. Be careful not to let your hip drop down, because that will twist your trunk. 4. Hold for about 6 seconds, then lower your leg and switch to the other leg. 5. Repeat 8 to 12 times on each leg. 6. Over time, work up to holding for 10 to 30 seconds each time. 7. If you feel stable and secure with your leg raised, try raising the opposite arm straight out in front of you at the same time. Knee-to-chest exercise    1. Lie on your back with your knees bent and your feet flat on the floor. 2. Bring one knee to your chest, keeping the other foot flat on the floor (or keeping the other leg straight, whichever feels better on your lower back). 3. Keep your lower back pressed to the floor. Hold for at least 15 to 30 seconds. 4. Relax, and lower the knee to the starting position. 5. Repeat with the other leg. Repeat 2 to 4 times with each leg.   6. To get more stretch, put your other leg flat on the floor while pulling your knee to your chest.  Curl-ups    1. Lie on the floor on your back with your knees bent at a 90-degree angle. Your feet should be flat on the floor, about 12 inches from your buttocks. 2. Cross your arms over your chest. If this bothers your neck, try putting your hands behind your neck (not your head), with your elbows spread apart. 3. Slowly tighten your belly muscles and raise your shoulder blades off the floor. 4. Keep your head in line with your body, and do not press your chin to your chest.  5. Hold this position for 1 or 2 seconds, then slowly lower yourself back down to the floor. 6. Repeat 8 to 12 times. Pelvic tilt exercise    1. Lie on your back with your knees bent. 2. \"Brace\" your stomach. This means to tighten your muscles by pulling in and imagining your belly button moving toward your spine. You should feel like your back is pressing to the floor and your hips and pelvis are rocking back. 3. Hold for about 6 seconds while you breathe smoothly. 4. Repeat 8 to 12 times. Heel dig bridging    1. Lie on your back with both knees bent and your ankles bent so that only your heels are digging into the floor. Your knees should be bent about 90 degrees. 2. Then push your heels into the floor, squeeze your buttocks, and lift your hips off the floor until your shoulders, hips, and knees are all in a straight line. 3. Hold for about 6 seconds as you continue to breathe normally, and then slowly lower your hips back down to the floor and rest for up to 10 seconds. 4. Do 8 to 12 repetitions. Hamstring stretch in doorway    1. Lie on your back in a doorway, with one leg through the open door. 2. Slide your leg up the wall to straighten your knee. You should feel a gentle stretch down the back of your leg. 3. Hold the stretch for at least 15 to 30 seconds. Do not arch your back, point your toes, or bend either knee.  Keep one heel touching the floor and the other heel touching the wall. 4. Repeat with your other leg. 5. Do 2 to 4 times for each leg. Hip flexor stretch    1. Kneel on the floor with one knee bent and one leg behind you. Place your forward knee over your foot. Keep your other knee touching the floor. 2. Slowly push your hips forward until you feel a stretch in the upper thigh of your rear leg. 3. Hold the stretch for at least 15 to 30 seconds. Repeat with your other leg. 4. Do 2 to 4 times on each side. Wall sit    1. Stand with your back 10 to 12 inches away from a wall. 2. Lean into the wall until your back is flat against it. 3. Slowly slide down until your knees are slightly bent, pressing your lower back into the wall. 4. Hold for about 6 seconds, then slide back up the wall. 5. Repeat 8 to 12 times. Follow-up care is a key part of your treatment and safety. Be sure to make and go to all appointments, and call your doctor if you are having problems. It's also a good idea to know your test results and keep a list of the medicines you take. Where can you learn more? Go to https://Yours FlorallypeCode71eweb.CasaRoma. org and sign in to your [employer] account. Enter C042 in the [employer] box to learn more about \"Low Back Pain: Exercises. \"     If you do not have an account, please click on the \"Sign Up Now\" link. Current as of: July 1, 2021               Content Version: 13.0  © 2006-2021 Healthwise, [facility]. Care instructions adapted under license by [facility] ([facility]). If you have questions about a medical condition or this instruction, always ask your healthcare professional. [place] [address] any warranty or liability for your use of this information. Urine in office negative for infection  Toradol injection given today in office. Do not take any other NSAIDS today.  May take tylenol if needed  May resume NSAIDs tomorrow if needed  May take muscle relaxer, ice/heat, Use lidocaine/icy hot patches as needed  If symptoms worsen or do not improve please follow-up with PCP or return to clinic

## 2021-12-13 NOTE — PROGRESS NOTES
[address] [facility]   [address] [place] [address]   Dept: [phone]  Dept Fax: [phone]    Christiano Lagos is a 42 y.o. male who presents today for his medical conditions/complaintsas noted below. Christiano Lagos is c/o of Lower Back Pain (middle of lower back onset for 2 days)        HPI:     Back Pain  This is a new problem. The current episode started in the past 7 days (2 days). The problem occurs constantly. The problem has been gradually worsening since onset. The pain is present in the lumbar spine. The quality of the pain is described as aching. The pain does not radiate. The pain is at a severity of 8/10. The pain is severe. The symptoms are aggravated by standing and position. Pertinent negatives include no abdominal pain, bladder incontinence, bowel incontinence, dysuria, fever, leg pain, numbness, pelvic pain, tingling or weakness. He has tried NSAIDs for the symptoms. The treatment provided mild relief. Past Medical History:   Diagnosis Date    GERD (gastroesophageal reflux disease)     Irritable bowel syndrome     Past medical history reviewed and pertinent positives/negatives in the HPI    Past Surgical History:   Procedure Laterality Date    COLONOSCOPY      UPPER GASTROINTESTINAL ENDOSCOPY  08/10/2009       Family History   Problem Relation Age of Onset    Hypertension Mother     Diabetes Father     Cancer Father         colon  death age 71       Social History     Tobacco Use    Smoking status: Never Smoker    Smokeless tobacco: Never Used   Substance Use Topics    Alcohol use:  Yes     Alcohol/week: 0.0 standard drinks     Comment: occasionally      Current Outpatient Medications   Medication Sig Dispense Refill    cyclobenzaprine (FLEXERIL) 10 MG tablet Take 1 tablet by mouth 3 times daily as needed for Muscle spasms 15 tablet 0    pitavastatin (LIVALO) 1 MG TABS tablet Take 1 tablet by mouth nightly 90

## 2021-12-14 ENCOUNTER — OFFICE VISIT (OUTPATIENT)
Dept: FAMILY MEDICINE CLINIC | Age: 41
End: 2021-12-14
Payer: COMMERCIAL

## 2021-12-14 VITALS
DIASTOLIC BLOOD PRESSURE: 90 MMHG | SYSTOLIC BLOOD PRESSURE: 149 MMHG | OXYGEN SATURATION: 99 % | TEMPERATURE: 97.9 F | HEART RATE: 84 BPM

## 2021-12-14 DIAGNOSIS — M54.50 ACUTE MIDLINE LOW BACK PAIN WITHOUT SCIATICA: Primary | ICD-10-CM

## 2021-12-14 DIAGNOSIS — L20.9 ATOPIC DERMATITIS, UNSPECIFIED TYPE: ICD-10-CM

## 2021-12-14 PROCEDURE — 99214 OFFICE O/P EST MOD 30 MIN: CPT

## 2021-12-14 RX ORDER — IBUPROFEN 800 MG/1
800 TABLET ORAL EVERY 8 HOURS PRN
Qty: 21 TABLET | Refills: 0 | Status: SHIPPED | OUTPATIENT
Start: 2021-12-14 | End: 2022-07-12 | Stop reason: ALTCHOICE

## 2021-12-14 RX ORDER — TRIAMCINOLONE ACETONIDE 1 MG/G
CREAM TOPICAL
Qty: 30 G | Refills: 0 | Status: SHIPPED | OUTPATIENT
Start: 2021-12-14

## 2021-12-14 RX ORDER — PREDNISONE 20 MG/1
40 TABLET ORAL DAILY
Qty: 10 TABLET | Refills: 0 | Status: SHIPPED | OUTPATIENT
Start: 2021-12-14 | End: 2021-12-19

## 2021-12-14 ASSESSMENT — ENCOUNTER SYMPTOMS
EYE DISCHARGE: 0
SHORTNESS OF BREATH: 0
SORE THROAT: 0
EYE ITCHING: 0
BOWEL INCONTINENCE: 0
DIARRHEA: 0
APNEA: 0
RHINORRHEA: 0
CHEST TIGHTNESS: 0
NAIL CHANGES: 0
WHEEZING: 0
EYE PAIN: 0
NAUSEA: 0
CHOKING: 0
BACK PAIN: 1
ABDOMINAL PAIN: 0
RESPIRATORY NEGATIVE: 1
GASTROINTESTINAL NEGATIVE: 1
EYES NEGATIVE: 1
SINUS PRESSURE: 0
STRIDOR: 0
SINUS PAIN: 0
COUGH: 0
VOMITING: 0

## 2021-12-14 NOTE — PROGRESS NOTES
[facility]  [facility]   [address] [facility] [id number] [address] [facility]   [address] [address]  Dept: [phone]    Salomon Talbot is a 42 y.o. male Established patient, who presents to the walk-in clinic today with conditions/complaints as noted below:    Chief Complaint   Patient presents with    Back Pain     pt stated that back pain started on sunday          HPI:     Back Pain  This is a new problem. The current episode started yesterday. The problem occurs constantly. The problem is unchanged. The pain is present in the lumbar spine. The quality of the pain is described as aching and shooting. The pain does not radiate. The patient is experiencing no pain. The pain is the same all the time. The symptoms are aggravated by bending, sitting and twisting. Pertinent negatives include no abdominal pain, bladder incontinence, bowel incontinence, chest pain, dysuria, fever, headaches, leg pain, numbness, paresis, paresthesias, pelvic pain, perianal numbness, tingling, weakness or weight loss. He has tried muscle relaxant and NSAIDs for the symptoms. The treatment provided mild relief. Rash  This is a chronic problem. Episode onset: come and go all through the year. The problem is unchanged. Location: right lower leg. The rash is characterized by dryness, redness and itchiness. It is unknown if there was an exposure to a precipitant. Pertinent negatives include no anorexia, congestion, cough, diarrhea, eye pain, facial edema, fatigue, fever, joint pain, nail changes, rhinorrhea, shortness of breath, sore throat or vomiting. Treatments tried: Lotion. The treatment provided no relief. Patient feels \"dizzy and drowsy\" when taking Muscle relaxants but states pain does improve. 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   Past Medical History:   Diagnosis Date    GERD (gastroesophageal reflux disease)     Irritable bowel syndrome        Current Outpatient Medications   Medication Sig Dispense Refill    ibuprofen (ADVIL;MOTRIN) 800 MG tablet Take 1 tablet by mouth every 8 hours as needed for Pain 21 tablet 0    predniSONE (DELTASONE) 20 MG tablet Take 2 tablets by mouth daily for 5 days 10 tablet 0    triamcinolone (KENALOG) 0.1 % cream Apply topically 2 times daily, until rash gone 30 g 0    cyclobenzaprine (FLEXERIL) 10 MG tablet Take 1 tablet by mouth 3 times daily as needed for Muscle spasms 15 tablet 0    pitavastatin (LIVALO) 1 MG TABS tablet Take 1 tablet by mouth nightly 90 tablet 1    melatonin 1 MG tablet Take 1 tablet by mouth nightly 180 tablet 1    Cholecalciferol (VITAMIN D3) 25 MCG (1000 UT) TABS TAKE 1 TABLET BY MOUTH DAILY 90 tablet 3     No current facility-administered medications for this visit. No Known Allergies    Review of Systems:     Review of Systems   Constitutional: Negative. Negative for activity change, appetite change, chills, diaphoresis, fatigue, fever, unexpected weight change and weight loss. HENT: Negative for congestion, ear pain, postnasal drip, rhinorrhea, sinus pressure, sinus pain, sneezing and sore throat. Eyes: Negative. Negative for pain, discharge and itching. Respiratory: Negative. Negative for apnea, cough, choking, chest tightness, shortness of breath, wheezing and stridor. Cardiovascular: Negative. Negative for chest pain, palpitations and leg swelling. Gastrointestinal: Negative. Negative for abdominal pain, anorexia, bowel incontinence, diarrhea, nausea and vomiting. Genitourinary: Negative for bladder incontinence, dysuria and pelvic pain. Musculoskeletal: Positive for back pain (low back pain). Negative for joint pain. Skin: Positive for rash (dry, itchy). Negative for nail changes. Neurological: Negative.   Negative for dizziness, tingling, tremors, seizures, syncope, facial asymmetry, speech difficulty, weakness, light-headedness, numbness, headaches and paresthesias. Physical Exam:      BP (!) 149/90   Pulse 84   Temp 97.9 °F (36.6 °C)   SpO2 99%     Physical Exam  Vitals reviewed. Constitutional:       Appearance: Normal appearance. He is normal weight. HENT:      Head: Normocephalic and atraumatic. Right Ear: Tympanic membrane, ear canal and external ear normal.      Left Ear: Tympanic membrane, ear canal and external ear normal.      Nose: Nose normal.      Mouth/Throat:      Mouth: Mucous membranes are moist.      Pharynx: Oropharynx is clear. Eyes:      Extraocular Movements: Extraocular movements intact. Conjunctiva/sclera: Conjunctivae normal.      Pupils: Pupils are equal, round, and reactive to light. Cardiovascular:      Rate and Rhythm: Normal rate and regular rhythm. Pulses: Normal pulses. Heart sounds: Normal heart sounds. Pulmonary:      Effort: Pulmonary effort is normal.      Breath sounds: Normal breath sounds. Abdominal:      General: Abdomen is flat. Bowel sounds are normal.      Palpations: Abdomen is soft. Musculoskeletal:         General: Normal range of motion. Cervical back: Normal range of motion and neck supple. Lumbar back: Spasms and tenderness present. Comments: Muscle stiffness noted on lower back, midline. Patient was seen yesterday and states he does not feel better from yesterday. Skin:     General: Skin is warm and dry. Capillary Refill: Capillary refill takes less than 2 seconds. Findings: Erythema and rash present. Comments: Dry, pruritic rash on right lower leg   Neurological:      General: No focal deficit present. Mental Status: He is alert and oriented to person, place, and time. Mental status is at baseline. Psychiatric:         Mood and Affect: Mood normal.         Behavior: Behavior normal.         Plan:          1.  Acute midline low back pain without sciatica  -     ibuprofen (ADVIL;MOTRIN) 800 MG tablet; Take 1 tablet by mouth every 8 hours as needed for Pain, Disp-21 tablet, R-0Normal  -     predniSONE (DELTASONE) 20 MG tablet; Take 2 tablets by mouth daily for 5 days, Disp-10 tablet, R-0Normal  2. Atopic dermatitis, unspecified type  -     triamcinolone (KENALOG) 0.1 % cream; Apply topically 2 times daily, until rash gone, Disp-30 g, R-0, Normal  Discussed possible effects of steroids regarding increasing blood sugars. Patient does not have history of diabetes. Agrees with plan of care to give oral steroids for 5 days. Back exercises included in paperwork. And follow-up with his PCP if his back pain persists. Follow Up Instructions:      Return if symptoms worsen or fail to improve, for Follow up with PCP within 1 week. Orders Placed This Encounter   Medications    ibuprofen (ADVIL;MOTRIN) 800 MG tablet     Sig: Take 1 tablet by mouth every 8 hours as needed for Pain     Dispense:  21 tablet     Refill:  0    predniSONE (DELTASONE) 20 MG tablet     Sig: Take 2 tablets by mouth daily for 5 days     Dispense:  10 tablet     Refill:  0    triamcinolone (KENALOG) 0.1 % cream     Sig: Apply topically 2 times daily, until rash gone     Dispense:  30 g     Refill:  0             Patient and/or parent given educational materials - see patient instructions. Discussed use, benefit, and side effects of prescribed medications. All patient questions answered. Patient and/or parent voiced understanding.       Electronically signed by PEPE Johnston 12/14/2021 at 2:35 PM

## 2021-12-14 NOTE — PATIENT INSTRUCTIONS
Patient Education        Low Back Pain: Exercises  Introduction  Here are some examples of exercises for you to try. The exercises may be suggested for a condition or for rehabilitation. Start each exercise slowly. Ease off the exercises if you start to have pain. You will be told when to start these exercises and which ones will work best for you. How to do the exercises  Press-up    1. Lie on your stomach, supporting your body with your forearms. 2. Press your elbows down into the floor to raise your upper back. As you do this, relax your stomach muscles and allow your back to arch without using your back muscles. As your press up, do not let your hips or pelvis come off the floor. 3. Hold for 15 to 30 seconds, then relax. 4. Repeat 2 to 4 times. Alternate arm and leg (bird dog) exercise    Do this exercise slowly. Try to keep your body straight at all times, and do not let one hip drop lower than the other. 1. Start on the floor, on your hands and knees. 2. Tighten your belly muscles. 3. Raise one leg off the floor, and hold it straight out behind you. Be careful not to let your hip drop down, because that will twist your trunk. 4. Hold for about 6 seconds, then lower your leg and switch to the other leg. 5. Repeat 8 to 12 times on each leg. 6. Over time, work up to holding for 10 to 30 seconds each time. 7. If you feel stable and secure with your leg raised, try raising the opposite arm straight out in front of you at the same time. Knee-to-chest exercise    1. Lie on your back with your knees bent and your feet flat on the floor. 2. Bring one knee to your chest, keeping the other foot flat on the floor (or keeping the other leg straight, whichever feels better on your lower back). 3. Keep your lower back pressed to the floor. Hold for at least 15 to 30 seconds. 4. Relax, and lower the knee to the starting position. 5. Repeat with the other leg. Repeat 2 to 4 times with each leg.   6. To get more stretch, put your other leg flat on the floor while pulling your knee to your chest.  Curl-ups    1. Lie on the floor on your back with your knees bent at a 90-degree angle. Your feet should be flat on the floor, about 12 inches from your buttocks. 2. Cross your arms over your chest. If this bothers your neck, try putting your hands behind your neck (not your head), with your elbows spread apart. 3. Slowly tighten your belly muscles and raise your shoulder blades off the floor. 4. Keep your head in line with your body, and do not press your chin to your chest.  5. Hold this position for 1 or 2 seconds, then slowly lower yourself back down to the floor. 6. Repeat 8 to 12 times. Pelvic tilt exercise    1. Lie on your back with your knees bent. 2. \"Brace\" your stomach. This means to tighten your muscles by pulling in and imagining your belly button moving toward your spine. You should feel like your back is pressing to the floor and your hips and pelvis are rocking back. 3. Hold for about 6 seconds while you breathe smoothly. 4. Repeat 8 to 12 times. Heel dig bridging    1. Lie on your back with both knees bent and your ankles bent so that only your heels are digging into the floor. Your knees should be bent about 90 degrees. 2. Then push your heels into the floor, squeeze your buttocks, and lift your hips off the floor until your shoulders, hips, and knees are all in a straight line. 3. Hold for about 6 seconds as you continue to breathe normally, and then slowly lower your hips back down to the floor and rest for up to 10 seconds. 4. Do 8 to 12 repetitions. Hamstring stretch in doorway    1. Lie on your back in a doorway, with one leg through the open door. 2. Slide your leg up the wall to straighten your knee. You should feel a gentle stretch down the back of your leg. 3. Hold the stretch for at least 15 to 30 seconds. Do not arch your back, point your toes, or bend either knee.  Keep one heel touching the floor and the other heel touching the wall. 4. Repeat with your other leg. 5. Do 2 to 4 times for each leg. Hip flexor stretch    1. Kneel on the floor with one knee bent and one leg behind you. Place your forward knee over your foot. Keep your other knee touching the floor. 2. Slowly push your hips forward until you feel a stretch in the upper thigh of your rear leg. 3. Hold the stretch for at least 15 to 30 seconds. Repeat with your other leg. 4. Do 2 to 4 times on each side. Wall sit    1. Stand with your back 10 to 12 inches away from a wall. 2. Lean into the wall until your back is flat against it. 3. Slowly slide down until your knees are slightly bent, pressing your lower back into the wall. 4. Hold for about 6 seconds, then slide back up the wall. 5. Repeat 8 to 12 times. Follow-up care is a key part of your treatment and safety. Be sure to make and go to all appointments, and call your doctor if you are having problems. It's also a good idea to know your test results and keep a list of the medicines you take. Where can you learn more? Go to https://[employer] PartnersewPlanStan.[facility]. org and sign in to your [employer] account. Enter K946 in the [facility] box to learn more about \"Low Back Pain: Exercises. \"     If you do not have an account, please click on the \"Sign Up Now\" link. Current as of: July 1, 2021               Content Version: 13.0  © 2006-2021 Healthwise, Incorporated. Care instructions adapted under license by [facility] [address] If you have questions about a medical condition or this instruction, always ask your healthcare professional. [place] [address] any warranty or liability for your use of this information.

## 2021-12-14 NOTE — LETTER
[facility]   [address] [facility] [address] [facility] [address]  Phone: [phone]  Fax: [phone]    PEPE Maharaj CNP        December 14, 2021     Patient: Sim Alegre   YOB: 1980   Date of Visit: 12/14/2021       To Whom It May Concern: It is my medical opinion that Martha People be excused 12/14/2021 due to medical illness. If you have any questions or concerns, please don't hesitate to call.     Sincerely,        PEPE Maharaj CNP

## 2022-04-12 ENCOUNTER — OFFICE VISIT (OUTPATIENT)
Dept: FAMILY MEDICINE CLINIC | Age: 42
End: 2022-04-12
Payer: COMMERCIAL

## 2022-04-12 VITALS
TEMPERATURE: 98.6 F | HEART RATE: 86 BPM | DIASTOLIC BLOOD PRESSURE: 82 MMHG | OXYGEN SATURATION: 99 % | BODY MASS INDEX: 31.84 KG/M2 | SYSTOLIC BLOOD PRESSURE: 118 MMHG | HEIGHT: 69 IN | WEIGHT: 215 LBS

## 2022-04-12 DIAGNOSIS — Z00.01 ENCOUNTER FOR WELL ADULT EXAM WITH ABNORMAL FINDINGS: Primary | ICD-10-CM

## 2022-04-12 DIAGNOSIS — F51.02 ADJUSTMENT INSOMNIA: ICD-10-CM

## 2022-04-12 DIAGNOSIS — E66.09 CLASS 1 OBESITY DUE TO EXCESS CALORIES WITH SERIOUS COMORBIDITY AND BODY MASS INDEX (BMI) OF 31.0 TO 31.9 IN ADULT: ICD-10-CM

## 2022-04-12 DIAGNOSIS — Z80.0 FAMILY HISTORY OF COLON CANCER: ICD-10-CM

## 2022-04-12 DIAGNOSIS — L30.9 DERMATITIS: ICD-10-CM

## 2022-04-12 DIAGNOSIS — E78.2 MIXED HYPERLIPIDEMIA: ICD-10-CM

## 2022-04-12 DIAGNOSIS — E55.9 VITAMIN D DEFICIENCY: ICD-10-CM

## 2022-04-12 PROCEDURE — G0447 BEHAVIOR COUNSEL OBESITY 15M: HCPCS | Performed by: FAMILY MEDICINE

## 2022-04-12 PROCEDURE — 99396 PREV VISIT EST AGE 40-64: CPT | Performed by: FAMILY MEDICINE

## 2022-04-12 ASSESSMENT — ENCOUNTER SYMPTOMS
BLOOD IN STOOL: 0
NAUSEA: 0
CONSTIPATION: 1
SINUS PRESSURE: 0
COLOR CHANGE: 0
TROUBLE SWALLOWING: 0
EYE REDNESS: 0
CHEST TIGHTNESS: 0
RECTAL PAIN: 0
ABDOMINAL PAIN: 0
DIARRHEA: 0
SORE THROAT: 0
RHINORRHEA: 0
VOMITING: 0
COUGH: 0
BACK PAIN: 0
ABDOMINAL DISTENTION: 0
SHORTNESS OF BREATH: 0
STRIDOR: 0
WHEEZING: 0

## 2022-04-12 ASSESSMENT — PATIENT HEALTH QUESTIONNAIRE - PHQ9
SUM OF ALL RESPONSES TO PHQ QUESTIONS 1-9: 0
2. FEELING DOWN, DEPRESSED OR HOPELESS: 0
SUM OF ALL RESPONSES TO PHQ QUESTIONS 1-9: 0
SUM OF ALL RESPONSES TO PHQ QUESTIONS 1-9: 0
SUM OF ALL RESPONSES TO PHQ9 QUESTIONS 1 & 2: 0
SUM OF ALL RESPONSES TO PHQ QUESTIONS 1-9: 0
1. LITTLE INTEREST OR PLEASURE IN DOING THINGS: 0

## 2022-04-12 NOTE — PROGRESS NOTES
Visit Information    Have you changed or started any medications since your last visit including any over-the-counter medicines, vitamins, or herbal medicines? no   Have you stopped taking any of your medications? Is so, why? -  no  Are you having any side effects from any of your medications? - no    Have you seen any other physician or provider since your last visit? yes -    Have you had any other diagnostic tests since your last visit?  no   Have you been seen in the emergency room and/or had an admission in a hospital since we last saw you?  no   Have you had your routine dental cleaning in the past 6 months?  yes -      Do you have an active MyChart account? If no, what is the barrier?   Yes    Patient Care Team:  PEPE Casas CNP as PCP - General (Family Medicine)  PE[facility]    Medical History Review  Past Medical, Family, and Social History reviewed and does contribute to the patient presenting condition    Health Maintenance   Topic Date Due    COVID-19 Vaccine (1) Never done    Depression Screen  02/08/2022    Lipid screen  02/09/2022    Flu vaccine (Season Ended) 09/01/2022    Diabetes screen  02/09/2024    DTaP/Tdap/Td vaccine (3 - Td or Tdap) 12/28/2030    Hepatitis C screen  Completed    HIV screen  Completed    Hepatitis A vaccine  Aged Out    Hepatitis B vaccine  Aged Out    Hib vaccine  Aged Out    Meningococcal (ACWY) vaccine  Aged Out    Pneumococcal 0-64 years Vaccine  Aged Out

## 2022-04-12 NOTE — PATIENT INSTRUCTIONS
Body Mass Index: Care Instructions  Your Care Instructions     Body mass index (BMI) can help you see if your weight is raising your risk for health problems. It uses a formula to compare how much you weigh with how tallyou are.  A BMI lower than 18.5 is considered underweight.  A BMI between 18.5 and 24.9 is considered healthy.  A BMI between 25 and 29.9 is considered overweight. A BMI of 30 or higher is considered obese. If your BMI is in the normal range, it means that you have a lower risk for weight-related health problems. If your BMI is in the overweight or obese range, you may be at increased risk for weight-related health problems, such as high blood pressure, heart disease, stroke, arthritis or joint pain, and diabetes. If your BMI is in the underweight range, you may be at increased risk for health problems such as fatigue, lower protection (immunity) againstillness, muscle loss, bone loss, hair loss, and hormone problems. BMI is just one measure of your risk for weight-related health problems. You may be at higher risk for health problems if you are not active, you eat anunhealthy diet, or you drink too much alcohol or use tobacco products. Follow-up care is a key part of your treatment and safety. Be sure to make and go to all appointments, and call your doctor if you are having problems. It's also a good idea to know your test results and keep alist of the medicines you take. How can you care for yourself at home?  Practice healthy eating habits. This includes eating plenty of fruits, vegetables, whole grains, lean protein, and low-fat dairy.  If your doctor recommends it, get more exercise. Walking is a good choice. Bit by bit, increase the amount you walk every day. Try for at least 30 minutes on most days of the week.  Do not smoke. Smoking can increase your risk for health problems. If you need help quitting, talk to your doctor about stop-smoking programs and medicines. These can increase your chances of quitting for good.  Limit alcohol to 2 drinks a day for men and 1 drink a day for women. Too much alcohol can cause health problems. If you have a BMI higher than 25   Your doctor may do other tests to check your risk for weight-related health problems. This may include measuring the distance around your waist. A waist measurement of more than 40 inches in men or 35 inches in women can increase the risk of weight-related health problems.  Talk with your doctor about steps you can take to stay healthy or improve your health. You may need to make lifestyle changes to lose weight and stay healthy, such as changing your diet and getting regular exercise. If you have a BMI lower than 18.5   Your doctor may do other tests to check your risk for health problems.  Talk with your doctor about steps you can take to stay healthy or improve your health. You may need to make lifestyle changes to gain or maintain weight and stay healthy, such as getting more healthy foods in your diet and doing exercises to build muscle. Where can you learn more? Go to https://[employer]. org and sign in to your [employer] account. Enter S176 in the [employer] box to learn more about \"Body Mass Index: Care Instructions. \"     If you do not have an account, please click on the \"Sign Up Now\" link. Current as of: December 27, 2021               Content Version: 13.2  © 2006-2022 Healthwise, Incorporated. Care instructions adapted under license by [facility] ([facility]). If you have questions about a medical condition or this instruction, always ask your healthcare professional. [place] [address] any warranty or liability for your use of this information. DASH Diet: Care Instructions  Your Care Instructions     The DASH diet is an eating plan that can help lower your blood pressure. DASH stands for Dietary Approaches to Stop Hypertension.  Hypertension is high bloodpressure. The DASH diet focuses on eating foods that are high in calcium, potassium, and magnesium. These nutrients can lower blood pressure. The foods that are highest in these nutrients are fruits, vegetables, low-fat dairy products, nuts, seeds, and legumes. But taking calcium, potassium, and magnesium supplements instead of eating foods that are high in those nutrients does not have the same effect. The DASH diet also includes whole grains, fish, and poultry. The DASH diet is one of several lifestyle changes your doctor may recommend to lower your high blood pressure. Your doctor may also want you to decrease the amount of sodium in your diet. Lowering sodium while following the DASH dietcan lower blood pressure even further than just the DASH diet alone. Follow-up care is a key part of your treatment and safety. Be sure to make and go to all appointments, and call your doctor if you are having problems. It's also a good idea to know your test results and keep alist of the medicines you take. How can you care for yourself at home? Following the DASH diet   Eat 4 to 5 servings of fruit each day. A serving is 1 medium-sized piece of fruit, ½ cup chopped or canned fruit, 1/4 cup dried fruit, or 4 ounces (½ cup) of fruit juice. Choose fruit more often than fruit juice.  Eat 4 to 5 servings of vegetables each day. A serving is 1 cup of lettuce or raw leafy vegetables, ½ cup of chopped or cooked vegetables, or 4 ounces (½ cup) of vegetable juice. Choose vegetables more often than vegetable juice.  Get 2 to 3 servings of low-fat and fat-free dairy each day. A serving is 8 ounces of milk, 1 cup of yogurt, or 1 ½ ounces of cheese.  Eat 6 to 8 servings of grains each day. A serving is 1 slice of bread, 1 ounce of dry cereal, or ½ cup of cooked rice, pasta, or cooked cereal. Try to choose whole-grain products as much as possible.  Limit lean meat, poultry, and fish to 2 servings each day.  A serving is 3 ounces, about the size of a deck of cards.  Eat 4 to 5 servings of nuts, seeds, and legumes (cooked dried beans, lentils, and split peas) each week. A serving is 1/3 cup of nuts, 2 tablespoons of seeds, or ½ cup of cooked beans or peas.  Limit fats and oils to 2 to 3 servings each day. A serving is 1 teaspoon of vegetable oil or 2 tablespoons of salad dressing.  Limit sweets and added sugars to 5 servings or less a week. A serving is 1 tablespoon jelly or jam, ½ cup sorbet, or 1 cup of lemonade.  Eat less than 2,300 milligrams (mg) of sodium a day. If you limit your sodium to 1,500 mg a day, you can lower your blood pressure even more.  Be aware that all of these are the suggested number of servings for people who eat 1,800 to 2,000 calories a day. Your recommended number of servings may be different if you need more or fewer calories. Tips for success   Start small. Do not try to make dramatic changes to your diet all at once. You might feel that you are missing out on your favorite foods and then be more likely to not follow the plan. Make small changes, and stick with them. Once those changes become habit, add a few more changes.  Try some of the following:  ? Make it a goal to eat a fruit or vegetable at every meal and at snacks. This will make it easy to get the recommended amount of fruits and vegetables each day. ? Try yogurt topped with fruit and nuts for a snack or healthy dessert. ? Add lettuce, tomato, cucumber, and onion to sandwiches. ? Combine a ready-made pizza crust with low-fat mozzarella cheese and lots of vegetable toppings. Try using tomatoes, squash, spinach, broccoli, carrots, cauliflower, and onions. ? Have a variety of cut-up vegetables with a low-fat dip as an appetizer instead of chips and dip. ? Sprinkle sunflower seeds or chopped almonds over salads. Or try adding chopped walnuts or almonds to cooked vegetables. ? Try some vegetarian meals using beans and peas. Add garbanzo or kidney beans to salads. Make burritos and tacos with mashed tobin beans or black beans. Where can you learn more? Go to https://[employer]ec.[employer]Pol. org and sign in to your [employer] account. Enter Z709 in the [facility] box to learn more about \"DASH Diet: Care Instructions. \"     If you do not have an account, please click on the \"Sign Up Now\" link. Current as of: January 10, 2022               Content Version: 13.2  © 4674-1241 Healthwise, Incorporated. Care instructions adapted under license by [facility] ([facility]). If you have questions about a medical condition or this instruction, always ask your healthcare professional. [address] any warranty or liability for your use of this information.

## 2022-04-12 NOTE — PROGRESS NOTES
2022      Ward Conteh (:  1980) is a 42 y.o. male, here for a preventive medicine evaluation, Annual Exam and Other (DRY SKIN RIGHT LEG/THE PAST FEW MONTHS/ CREAM NOT HELPING)   . ASSESSMENT/PLAN:    1. Encounter for well adult exam with abnormal findings  -     ME Behavior  obesity 15m []  -     CBC; Future  -     Comprehensive Metabolic Panel; Future  2. Mixed hyperlipidemia  -     Lipid, Fasting; Future  -     pitavastatin (LIVALO) 1 MG TABS tablet; Take 1 tablet by mouth nightly, Disp-90 tablet, R-1Normal  -     ME Behavior  obesity 15m []  3. Class 1 obesity due to excess calories with serious comorbidity and body mass index (BMI) of 31.0 to 31.9 in adult  -     ME Behavior  obesity 15m []  -     Hemoglobin A1C; Future  -     Vitamin D 25 Hydroxy; Future  -     TSH; Future  4. Adjustment insomnia  5. Vitamin D deficiency  -     Vitamin D 25 Hydroxy; Future  6. Dermatitis  -     hydrocortisone 2.5 % ointment; Apply topically 2 times daily. , Disp-200 g, R-1, Normal  7. Family history of colon cancer  -     [facility] Screening Colonoscopy      Low carb, low fat diet, increase fruits and vegetables, and exercise 4-5 times a week 30-40 minutes a day, or walk 1-2 hours per day, or wear a pedometer and get at least 10,000 steps per day. Dental exam 2-3 times /year advised. Immunizations reviewed. Health Maintenance reviewed   Smoking cessation counseling given, patient  Smoke    Annual eye exam advised. Jimmie Almanzar received counseling on the following healthy behaviors: nutrition, exercise, medication adherence and decrease in alcohol consumption  Reviewed prior labs and health maintenance  Discussed use, benefit, and side effects of prescribed medications. Barriers to medication compliance addressed. Patient given educational materials - see patient instructions  All patient questions answered. Patient voiced understanding.     The patient's past medical, surgical, social, and family history as well as his  current medications and allergies were reviewed as documented in today's encounter. Medications, labs, diagnostic studies, [facility] Return in about 3 months (around 7/12/2022) for pcp, lab work. Data Unavailable    Future Appointments   Date Time Provider Ruddy Sim   7/12/2022  3:30 [facility]   4/14/2023  3:30 PM PEPE Patterson CNP [facility]         Patient Active Problem List   Diagnosis    Family history of diabetes mellitus    Constipation    Obesity, Class I, BMI 30-34.9    Vitamin D deficiency    Yeast dermatitis    Fatigue    Adjustment insomnia    Shift work sleep disorder    Mixed hyperlipidemia    Dermatitis       Prior to Visit Medications    Medication Sig Taking? Authorizing Provider   pitavastatin (LIVALO) 1 MG TABS tablet Take 1 tablet by mouth nightly Yes Marito Akers MD   hydrocortisone 2.5 % ointment Apply topically 2 times daily. Yes Marito Akers MD   triamcinolone (KENALOG) 0.1 % cream Apply topically 2 times daily, until rash gone Yes PEPE Werner CNP   melatonin 1 MG tablet Take 1 tablet by mouth nightly Yes PEPE Patterson CNP   Cholecalciferol (VITAMIN D3) 25 MCG (1000 UT) TABS TAKE 1 TABLET BY MOUTH DAILY Yes PEPE Patterson CNP   ibuprofen (ADVIL;MOTRIN) 800 MG tablet Take 1 tablet by mouth every 8 hours as needed for Pain  PEPE Ahumada CNP        No Known Allergies    Past Medical History:   Diagnosis Date    GERD (gastroesophageal reflux disease)     Irritable bowel syndrome        Past Surgical History:   Procedure Laterality Date    COLONOSCOPY      UPPER GASTROINTESTINAL ENDOSCOPY  08/10/2009       .   Social History     Tobacco Use    Smoking status: Never Smoker    Smokeless tobacco: Never Used   Substance Use Topics    Alcohol use: Yes     Alcohol/week: 0.0 standard drinks     Comment: occasionally    Drug use: No       Family History   Problem Relation Age of Onset    Hypertension Mother     Diabetes Father     Cancer Father         colon  death age 71       ADVANCE DIRECTIVE: N, <no information>    SUBJECTIVE / Racquel Amen is here for Annual Exam and Other (DRY SKIN RIGHT LEG/THE PAST FEW MONTHS/ CREAM NOT HELPING)       Current concerns: Patient has history of hyperlipidemia, no recent blood work. Patient needs physical form filled out for work. Patient is on statins reports recently he has not refilled that. Obesity worsening, patient has gained few pounds, reports he is trying to be physically and watching his diet but nothing is helping. He admits to drinking every weekend 3-4 beers. Insomnia takes melatonin only as needed. Patient has history of dermatitis on right lower extremity reports its since last 6 months on and off tried some medications before but it recurs. It started after he had trauma. It is associate with itching. The rash is dry scaly lesion. Urinary symptoms: no    Sexually active: Yes     Wears seatbelts: yes     Regular exercise: yes  Ever been transfused or tattooed?: not asked    Last eye exam: up to date: Yes        Visual Acuity Screening    Right eye Left eye Both eyes   Without correction: 20/20 20/25 20/13   With correction:          Hearing:normal :Yes    Last dental exam and preventative dental cleaning: up to date : Yes    Nutritional assessment: Body mass index is 31.75 kg/m². Elevated. Weight is   Wt Readings from Last 3 Encounters:   04/12/22 215 lb (97.5 kg)   06/21/21 218 lb (98.9 kg)   02/08/21 223 lb (101.2 kg)       Healthful diet and Physical activity counseling to prevent CVD- low carb, low fat diet, increase fruits and vegetables, and exercise 4-5 times a day 30-40minutes a day discussed    Nicotine dependence.  no  Smoker, counseling given to quit smoking. 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 Patient reports he had 1 colonoscopy done about 10 years before when he was constipated no results found in epic. He is due for colonoscopy. The patient has family history of cancer.     Lab Results   Component Value Date    PSA 1.38 02/09/2021    PSA 1.25 02/08/2020         Blood pressure is normal.  BP Readings from Last 3 Encounters:   04/12/22 118/82   12/14/21 (!) 149/90   12/13/21 123/82       Pulse is normal.  Pulse Readings from Last 3 Encounters:   04/12/22 86   12/14/21 84   12/13/21 81       A1c is   Lab Results   Component Value Date    LABA1C 5.5 02/09/2021    LABA1C 5.3 02/08/2020    LABA1C 5.3 07/21/2018       Lipid screening -    Lab Results   Component Value Date    CHOL 165 07/21/2018    CHOL 197 02/07/2017    CHOL 197 12/05/2015     Lab Results   Component Value Date    TRIG 48 07/21/2018    TRIG 115 02/07/2017    TRIG 89 12/05/2015     Lab Results   Component Value Date    HDL 49 02/09/2021    HDL 50 02/08/2020    HDL 52 07/21/2018     Lab Results   Component Value Date    LDLCHOLESTEROL 144 (H) 02/09/2021    LDLCHOLESTEROL 129 02/08/2020    LDLCHOLESTEROL 103 07/21/2018     Lab Results   Component Value Date    CHOLHDLRATIO 4.7 02/09/2021    CHOLHDLRATIO 4.1 02/08/2020    CHOLHDLRATIO 3.2 07/21/2018 Cardiovascular risk is: The 10-year ASCVD risk score (Kadie Romero et al., 2013) is: 1.6%    Values used to calculate the score:      Age: 42 years      Sex: Male      Is Non- : No      Diabetic: No      Tobacco smoker: No      Systolic Blood Pressure: 884 mmHg      Is BP treated: No      HDL Cholesterol: 49 mg/dL      Total Cholesterol: 229 mg/dL    Hepatitis C screening-   Lab Results   Component Value Date    HEPCAB NONREACTIVE 02/09/2021            [x]Negative depression screening. []1-4 = Minimal depression   []5-9 = Mild depression   []10-14 = Moderate depression   []15-19 = Moderately severe depression   []20-27 = Severe depression    PHQ Scores 4/12/2022 2/8/2021 2/6/2020 5/22/2018 2/7/2017 11/30/2015   PHQ2 Score 0 1 0 0 0 0   PHQ9 Score 0 1 0 0 0 0       Health Maintenance   Topic Date Due    COVID-19 Vaccine (1) Never done    Lipid screen  02/09/2022    Flu vaccine (Season Ended) 09/01/2022    Depression Screen  04/12/2023    Diabetes screen  02/09/2024    DTaP/Tdap/Td vaccine (3 - Td or Tdap) 12/28/2030    Hepatitis C screen  Completed    HIV screen  Completed    Hepatitis A vaccine  Aged Out    Hepatitis B vaccine  Aged Out    Hib vaccine  Aged Out    Meningococcal (ACWY) vaccine  Aged Out    Pneumococcal 0-64 years Vaccine  Aged Out       -rest of complaints with corresponding details per ROS    Review of Systems   Constitutional: Positive for unexpected weight change. Negative for activity change, appetite change, fatigue and fever. HENT: Negative for congestion, ear pain, postnasal drip, rhinorrhea, sinus pressure, sore throat and trouble swallowing. Eyes: Negative for redness and visual disturbance. Respiratory: Negative for cough, chest tightness, shortness of breath, wheezing and stridor. Cardiovascular: Negative for chest pain, palpitations and leg swelling. Gastrointestinal: Positive for constipation.  Negative for abdominal distention, abdominal pain, blood in stool, diarrhea, nausea, rectal pain and vomiting. Endocrine: Negative for polydipsia, polyphagia and polyuria. Genitourinary: Negative for difficulty urinating, flank pain, frequency and urgency. Musculoskeletal: Negative for arthralgias, back pain, gait problem, myalgias and neck pain. Skin: Positive for rash. Negative for color change and wound. Allergic/Immunologic: Negative for food allergies and immunocompromised state. Neurological: Positive for numbness. Negative for dizziness, speech difficulty, weakness, light-headedness and headaches. Psychiatric/Behavioral: Negative for agitation, behavioral problems, decreased concentration, dysphoric mood, hallucinations, sleep disturbance and suicidal ideas. The patient is nervous/anxious.          -vital signs stable and within normal limits except     /82   Pulse 86   Temp 98.6 °F (37 °C)   Ht 5' 9\" (1.753 m)   Wt 215 lb (97.5 kg)   SpO2 99%   BMI 31.75 kg/m²   Vitals:    04/12/22 1438   BP: 118/82   Pulse: 86   Temp: 98.6 °F (37 °C)   SpO2: 99%   Weight: 215 lb (97.5 kg)   Height: 5' 9\" (1.753 m)     Estimated body mass index is 31.75 kg/m² as calculated from the following:    Height as of this encounter: 5' 9\" (1.753 m). Weight as of this encounter: 215 lb (97.5 kg). Physical Exam  Vitals and nursing note reviewed. Constitutional:       General: He is not in acute distress. Appearance: Normal appearance. He is well-developed. He is obese. He is not diaphoretic. HENT:      Head: Normocephalic and atraumatic. Nose: Nose normal.   Eyes:      General:         Right eye: No discharge. Left eye: No discharge. Extraocular Movements: Extraocular movements intact. Conjunctiva/sclera: Conjunctivae normal.      Pupils: Pupils are equal, round, and reactive to light. Neck:      Thyroid: No thyromegaly. Cardiovascular:      Rate and Rhythm: Normal rate and regular rhythm.       Heart sounds: Normal heart sounds. No murmur heard. Pulmonary:      Effort: Pulmonary effort is normal. No respiratory distress. Breath sounds: Normal breath sounds. No rhonchi. Abdominal:      General: Bowel sounds are normal. There is no distension. Palpations: Abdomen is soft. There is no mass. Tenderness: There is no abdominal tenderness. Musculoskeletal:         General: No tenderness. Cervical back: Neck supple. No rigidity or spasms. Normal range of motion. Thoracic back: No spasms. Normal range of motion. Lumbar back: No deformity or spasms. Normal range of motion. Lymphadenopathy:      Cervical: No cervical adenopathy. Skin:     Coloration: Skin is not jaundiced or pale. Findings: Rash present. No bruising or erythema. Rash is scaling. Neurological:      General: No focal deficit present. Mental Status: He is alert and oriented to person, place, and time. Cranial Nerves: No cranial nerve deficit. Sensory: No sensory deficit. Motor: No weakness or tremor. Coordination: Romberg sign negative. Coordination normal.      Gait: Gait and tandem walk normal.      Deep Tendon Reflexes: Reflexes are normal and symmetric. Psychiatric:         Attention and Perception: Attention and perception normal. He is attentive. Mood and Affect: Mood is anxious. Mood is not depressed. Affect is not angry or tearful. Speech: He is communicative. Speech is not rapid and pressured, delayed or slurred. Behavior: Behavior normal. Behavior is not agitated or slowed. Thought Content: Thought content normal.         Judgment: Judgment normal.         No flowsheet data found. I personally reviewed testing with patient.       Otherwise labs within normal limits      Lab Results   Component Value Date    WBC 7.0 02/09/2021    HGB 15.6 02/09/2021    HCT 46.0 02/09/2021    MCV 89.8 02/09/2021     02/09/2021       Lab Results   Component Value Date     02/09/2021    K 4.0 02/09/2021     02/09/2021    CO2 28 02/09/2021    BUN 16 02/09/2021    CREATININE 0.71 02/09/2021    GLUCOSE 114 07/21/2018    CALCIUM 9.3 02/09/2021        Lab Results   Component Value Date    ALT 31 02/09/2021    AST 25 02/09/2021    ALKPHOS 80 02/09/2021    BILITOT 0.44 02/09/2021       Lab Results   Component Value Date    TSH 0.65 02/08/2020       Lab Results   Component Value Date    LDLCHOLESTEROL 144 (H) 02/09/2021       Lab Results   Component Value Date    LABA1C 5.5 02/09/2021       No results found for: [id number]    No results found for: FOLATE    No results found for: IRON, TIBC, FERRITIN    Lab Results   Component Value Date    VITD25 23.5 (L) 02/09/2021         Orders Placed This Encounter   Medications    pitavastatin (LIVALO) 1 MG TABS tablet     Sig: Take 1 tablet by mouth nightly     Dispense:  90 tablet     Refill:  1    hydrocortisone 2.5 % ointment     Sig: Apply topically 2 times daily. Dispense:  200 g     Refill:  1         Medications Discontinued During This Encounter   Medication Reason    pitavastatin (LIVALO) 1 MG TABS tablet REORDER         Orders Placed This Encounter   Procedures    Lipid, Fasting     Standing Status:   Future     Standing Expiration Date:   4/12/2023    CBC     Standing Status:   Future     Standing Expiration Date:   4/12/2023    Comprehensive Metabolic Panel     Fasting 8 hrs     Standing Status:   Future     Standing Expiration Date:   4/12/2023    Hemoglobin A1C     Standing Status:   Future     Standing Expiration Date:   4/12/2023    Vitamin D 25 Hydroxy     Standing Status:   Future     Standing Expiration Date:   4/12/2023    TSH     Standing Status:   Future     Standing Expiration Date:   4/12/2023   [facility] Screening Colonoscopy     Referral Priority:   Routine     Referral Type:    Other     Referral Reason:   Specialty Services Required     Number of Visits Requested:   1  ME Behavior  obesity 15m []         This note was completed by using the assistance of a speech-recognition program. However, inadvertent computerized transcription errors may be present. Although every effort was made to ensure accuracy, no guarantees can be provided that every mistake has been identified and corrected by editing. An electronic signature was used to authenticate this note. Electronically signed by Geronimo Smith MD on 4/12/2022 at 6:38 PM    Cardiovascular Disease Risk Counseling: Assessed the patient's risk to develop cardiovascular disease and reviewed main risk factors. Reviewed steps to reduce disease risk including:   · Quitting tobacco use, reducing amount smoked, or not starting the habit  · Making healthy food choices  · Being physically active and gradualy increasing activity levels   · Reduce weight and determine a healthy BMI goal  · Monitor blood pressure and treat if higher than 140/90 mmHg  · Maintain blood total cholesterol levels under 5 mmol/l or 190 mg/dl  · Maintain LDL cholesterol levels under 3.0 mmol/l or 115 mg/dl   · Control blood glucose levels  · Consider taking aspirin (75 mg daily), once blood pressure is controlled   Provided a follow up plan.   Time spent (minutes): 59

## 2022-04-14 ENCOUNTER — HOSPITAL ENCOUNTER (OUTPATIENT)
Age: 42
Discharge: HOME | End: 2022-04-14
Payer: COMMERCIAL

## 2022-04-14 DIAGNOSIS — E66.09 CLASS 1 OBESITY DUE TO EXCESS CALORIES WITH SERIOUS COMORBIDITY AND BODY MASS INDEX (BMI) OF 31.0 TO 31.9 IN ADULT: ICD-10-CM

## 2022-04-14 DIAGNOSIS — Z00.01 ENCOUNTER FOR WELL ADULT EXAM WITH ABNORMAL FINDINGS: ICD-10-CM

## 2022-04-14 DIAGNOSIS — E55.9 VITAMIN D DEFICIENCY: ICD-10-CM

## 2022-04-14 DIAGNOSIS — E78.2 MIXED HYPERLIPIDEMIA: ICD-10-CM

## 2022-04-14 LAB
ALBUMIN SERPL-MCNC: 4.3 G/DL (ref 3.5–5.2)
ALP BLD-CCNC: 77 U/L (ref 40–129)
ALT SERPL-CCNC: 35 U/L (ref 5–41)
ANION GAP SERPL CALCULATED.3IONS-SCNC: 9 MMOL/L (ref 9–17)
AST SERPL-CCNC: 26 U/L
BILIRUB SERPL-MCNC: 0.56 MG/DL (ref 0.3–1.2)
BUN BLDV-MCNC: 15 MG/DL (ref 6–20)
CALCIUM SERPL-MCNC: 9.4 MG/DL (ref 8.6–10.4)
CHLORIDE BLD-SCNC: 104 MMOL/L (ref 98–107)
CHOLESTEROL, FASTING: 201 MG/DL
CHOLESTEROL/HDL RATIO: 4.3
CO2: 27 MMOL/L (ref 20–31)
CREAT SERPL-MCNC: 0.77 MG/DL (ref 0.7–1.2)
ESTIMATED AVERAGE GLUCOSE: 111 MG/DL
GFR AFRICAN AMERICAN: >60 ML/MIN
GFR NON-AFRICAN AMERICAN: >60 ML/MIN
GFR SERPL CREATININE-BSD FRML MDRD: NORMAL ML/MIN/{1.73_M2}
GLUCOSE BLD-MCNC: 95 MG/DL (ref 70–99)
HBA1C MFR BLD: 5.5 % (ref 4–6)
HCT VFR BLD CALC: 45.4 % (ref 41–53)
HDLC SERPL-MCNC: 47 MG/DL
HEMOGLOBIN: 15.5 G/DL (ref 13.5–17.5)
LDL CHOLESTEROL: 130 MG/DL (ref 0–130)
MCH RBC QN AUTO: 30.3 PG (ref 26–34)
MCHC RBC AUTO-ENTMCNC: 34.2 G/DL (ref 31–37)
MCV RBC AUTO: 88.5 FL (ref 80–100)
PDW BLD-RTO: 13.2 % (ref 11.5–14.9)
PLATELET # BLD: 350 K/UL (ref 150–450)
PMV BLD AUTO: 7.8 FL (ref 6–12)
POTASSIUM SERPL-SCNC: 4.7 MMOL/L (ref 3.7–5.3)
RBC # BLD: 5.13 M/UL (ref 4.5–5.9)
SODIUM BLD-SCNC: 140 MMOL/L (ref 135–144)
TOTAL PROTEIN: 7.5 G/DL (ref 6.4–8.3)
TRIGLYCERIDE, FASTING: 122 MG/DL
TSH SERPL DL<=0.05 MIU/L-ACNC: 0.47 UIU/ML (ref 0.3–5)
VITAMIN D 25-HYDROXY: 29.4 NG/ML
WBC # BLD: 6.6 K/UL (ref 3.5–11)

## 2022-04-14 PROCEDURE — 85027 COMPLETE CBC AUTOMATED: CPT

## 2022-04-14 PROCEDURE — 36415 COLL VENOUS BLD VENIPUNCTURE: CPT

## 2022-04-14 PROCEDURE — 83036 HEMOGLOBIN GLYCOSYLATED A1C: CPT

## 2022-04-14 PROCEDURE — 84443 ASSAY THYROID STIM HORMONE: CPT

## 2022-04-14 PROCEDURE — 82306 VITAMIN D 25 HYDROXY: CPT

## 2022-04-14 PROCEDURE — 80061 LIPID PANEL: CPT

## 2022-04-14 PROCEDURE — 80053 COMPREHEN METABOLIC PANEL: CPT

## 2022-05-04 ENCOUNTER — OFFICE VISIT (OUTPATIENT)
Dept: GASTROENTEROLOGY | Age: 42
End: 2022-05-04
Payer: COMMERCIAL

## 2022-05-04 ENCOUNTER — TELEPHONE (OUTPATIENT)
Dept: GASTROENTEROLOGY | Age: 42
End: 2022-05-04

## 2022-05-04 VITALS
SYSTOLIC BLOOD PRESSURE: 128 MMHG | HEIGHT: 69 IN | WEIGHT: 214 LBS | BODY MASS INDEX: 31.7 KG/M2 | HEART RATE: 75 BPM | DIASTOLIC BLOOD PRESSURE: 84 MMHG

## 2022-05-04 DIAGNOSIS — K21.9 GASTROESOPHAGEAL REFLUX DISEASE, UNSPECIFIED WHETHER ESOPHAGITIS PRESENT: Primary | ICD-10-CM

## 2022-05-04 DIAGNOSIS — Z80.0 FAMILY HISTORY OF COLON CANCER IN FATHER: ICD-10-CM

## 2022-05-04 PROCEDURE — 99203 OFFICE O/P NEW LOW 30 MIN: CPT | Performed by: INTERNAL MEDICINE

## 2022-05-04 RX ORDER — OMEPRAZOLE 20 MG/1
20 TABLET, DELAYED RELEASE ORAL DAILY
Qty: 30 TABLET | Refills: 3 | Status: SHIPPED | OUTPATIENT
Start: 2022-05-04

## 2022-05-04 ASSESSMENT — ENCOUNTER SYMPTOMS
SORE THROAT: 0
ANAL BLEEDING: 0
ABDOMINAL DISTENTION: 0
TROUBLE SWALLOWING: 1
VOICE CHANGE: 0
WHEEZING: 0
VOMITING: 0
ABDOMINAL PAIN: 0
COUGH: 1
RECTAL PAIN: 1
BLOOD IN STOOL: 0
CHOKING: 1
NAUSEA: 0
CONSTIPATION: 1
SHORTNESS OF BREATH: 0
DIARRHEA: 0
COLOR CHANGE: 0
APNEA: 0

## 2022-05-04 NOTE — PROGRESS NOTES
Reason for Referral: Family history of colorectal cancer  Intermittent constipation      Vanita Bailey MD  [address],  [place] [address]    Chief Complaint   Patient presents with    New Patient     Family history of colon cancer    Constipation     Reason for EGD     Heartburn    Other     a lot of gas           HISTORY OF PRESENT ILLNESS: Mr.Hector Bina Landis is a 42 y.o. male with a past history remarkable for family history of colorectal cancer, father was diagnosed with colorectal cancer several years ago, previous colonoscopy performed in 2009 which failed to reveal any evidence of precancerous lesions or polyps, referred for evaluation of surveillance colonoscopy. Patient denies any prior left rectum, hematochezia, no melena. Denies any changes in appetite or weight changes. Non-smoker nondrinker. Smoker: None   Drinking history: Social  Illicit drugs: None   Abdominal surgeries: None   Prior Colonoscopy: 2009-- No polyps. Kenyetta Fillers--   Prior EGD: 2009--   FH of GI issues: Father with CRC. Past Medical,Family, and Social History reviewed and does contribute to the patient presentingcondition. Patient's PMH/PSH,SH,PSYCH Hx, MEDs, ALLERGIES, and ROS were all reviewed and updated in the appropriate sections. PAST MEDICAL HISTORY:  Past Medical History:   Diagnosis Date    GERD (gastroesophageal reflux disease)     Irritable bowel syndrome        Past Surgical History:   Procedure Laterality Date    COLONOSCOPY      UPPER GASTROINTESTINAL ENDOSCOPY  08/10/2009       CURRENT MEDICATIONS:    Current Outpatient Medications:     omeprazole (PRILOSEC OTC) 20 MG tablet, Take 1 tablet by mouth daily, Disp: 30 tablet, Rfl: 3    pitavastatin (LIVALO) 1 MG TABS tablet, Take 1 tablet by mouth nightly, Disp: 90 tablet, Rfl: 1    hydrocortisone 2.5 % ointment, Apply topically 2 times daily. , Disp: 200 g, Rfl: 1    triamcinolone (KENALOG) 0.1 % cream, Apply topically 2 times daily, until rash gone, Disp: 30 g, Rfl: 0    melatonin 1 MG tablet, Take 1 tablet by mouth nightly, Disp: 180 tablet, Rfl: 1    Cholecalciferol (VITAMIN D3) 25 MCG (1000 UT) TABS, TAKE 1 TABLET BY MOUTH DAILY, Disp: 90 tablet, Rfl: 3    polyethylene glycol (GLYCOLAX) 17 GM/SCOOP powder, Take as directed for bowel prep, Disp: 238 g, Rfl: 0    bisacodyl (BISACODYL) 5 MG EC tablet, Take as directed for bowel prep, Disp: 4 tablet, Rfl: 0    ibuprofen (ADVIL;MOTRIN) 800 MG tablet, Take 1 tablet by mouth every 8 hours as needed for Pain, Disp: 21 tablet, Rfl: 0    ALLERGIES:   No Known Allergies    FAMILY HISTORY:       Problem Relation Age of Onset    Hypertension Mother     Diabetes Father     Cancer Father         colon  death age 71         SOCIAL HISTORY:   Social History     Socioeconomic History    Marital status:      Spouse name: Not on file    Number of children: Not on file    Years of education: Not on file    Highest education level: Not on file   Occupational History    Not on file   Tobacco Use    Smoking status: Never Smoker    Smokeless tobacco: Never Used   Substance and Sexual Activity    Alcohol use: Yes     Alcohol/week: 0.0 standard drinks     Comment: occasionally    Drug use: No    Sexual activity: Yes   Other Topics Concern    Not on file   Social History Narrative    Not on file     Social Determinants of Health     Financial Resource Strain: Low Risk     Difficulty of Paying Living Expenses: Not very hard   Food Insecurity: No Food Insecurity    Worried About Running Out of Food in the Last Year: Never true    Flor of Food in the Last Year: Never true   Transportation Needs:     Lack of Transportation (Medical): Not on file    Lack of Transportation (Non-Medical):  Not on file   Physical Activity:     Days of Exercise per Week: Not on file    Minutes of Exercise per Session: Not on file   Stress:     Feeling of Stress : Not on file   Social Connections:     Frequency of Communication with Friends and Family: Not on file    Frequency of Social Gatherings with Friends and Family: Not on file    Attends [employer] Services: Not on file    Active Member of Clubs or Organizations: Not on file    Attends Club or Organization Meetings: Not on file    Marital Status: Not on file   Intimate Partner Violence:     Fear of Current or Ex-Partner: Not on file    Emotionally Abused: Not on file    Physically Abused: Not on file    Sexually Abused: Not on file   Housing Stability:     Unable to Pay for Housing in the Last Year: Not on file    Number of [place] in the Last Year: Not on file    Unstable Housing in the Last Year: Not on file         REVIEW OF SYSTEMS: A 12-point review of systems was obtained and pertinent positives and negatives were listed below. REVIEW OF SYSTEMS:     Constitutional: No fever, no chills, no lethargy, no weakness. HEENT:  No headache, otalgia, itchy eyes, nasal discharge or sore throat. Cardiac:  No chest pain, dyspnea, orthopnea or PND. Chest:   No cough, phlegm or wheezing. Abdomen:      Detailed by MA   Neuro:  No focal weakness, abnormal movements or seizure like activity. Skin:   No rashes, no itching. :   No hematuria, no pyuria, no dysuria, no flank pain. Extremities:  No swelling or joint pains. ROS was otherwise negative    Review of Systems   Constitutional: Positive for fatigue. Negative for appetite change (some days no appetite), fever and unexpected weight change. HENT: Positive for trouble swallowing. Negative for sore throat and voice change. Eyes: Negative for visual disturbance. Respiratory: Positive for cough and choking. Negative for apnea, shortness of breath and wheezing. Cardiovascular: Negative for chest pain, palpitations and leg swelling. Gastrointestinal: Positive for constipation (dark stool) and rectal pain.  Negative for abdominal distention, abdominal pain, anal bleeding, blood in stool, diarrhea, nausea and vomiting. Genitourinary: Negative for difficulty urinating. Skin: Positive for rash (right leg). Negative for color change. Neurological: Positive for dizziness, weakness, light-headedness, numbness and headaches. Negative for seizures. Hematological: Bruises/bleeds easily. Psychiatric/Behavioral: Positive for sleep disturbance. Negative for confusion. The patient is not nervous/anxious. PHYSICAL EXAMINATION: Vital signs reviewed per the nursing documentation. /84   Pulse 75   Ht 5' 9\" (1.753 m)   Wt 214 lb (97.1 kg)   BMI 31.60 kg/m²   Body mass index is 31.6 kg/m². Physical Exam    Physical Exam   Constitutional: Patient is oriented to person, place, and time. Patient appears well-developed and well-nourished. HENT:   Head: Normocephalic and atraumatic. Eyes: Pupils are equal, round, and reactive to light. EOM are normal.   Neck: Normal range of motion. Neck supple. No JVD present. No tracheal deviation present. No thyromegaly present. Cardiovascular: Normal rate, regular rhythm, normal heart sounds and intact distal pulses. Pulmonary/Chest: Effort normal and breath sounds normal. No stridor. No respiratory distress. He has no wheezes. He has no rales. He exhibits no tenderness. Abdominal: Soft. Bowel sounds are normal. He exhibits no distension and no mass. There is no tenderness. There is no rebound and no guarding. No hernia. Musculoskeletal: Normal range of motion. Lymphadenopathy:    Patient has no cervical adenopathy. Neurological: Patient is alert and oriented to person, place, and time. Psychiatric: Patient has a normal mood and affect.  Patient behavior is normal.       LABORATORY DATA: Reviewed  Lab Results   Component Value Date    WBC 6.6 04/14/2022    HGB 15.5 04/14/2022    HCT 45.4 04/14/2022    MCV 88.5 04/14/2022     04/14/2022     04/14/2022    K 4.7 04/14/2022     04/14/2022    CO2 27 04/14/2022    BUN 15 04/14/2022    CREATININE 0.77 04/14/2022    LABALBU 4.3 04/14/2022    BILITOT 0.56 04/14/2022    ALKPHOS 77 04/14/2022    AST 26 04/14/2022    ALT 35 04/14/2022         Lab Results   Component Value Date    RBC 5.13 04/14/2022    HGB 15.5 04/14/2022    MCV 88.5 04/14/2022    MCH 30.3 04/14/2022    MCHC 34.2 04/14/2022    RDW 13.2 04/14/2022    MPV 7.8 04/14/2022    BASOPCT 1 02/09/2021    LYMPHSABS 2.10 02/09/2021    MONOSABS 0.60 02/09/2021    NEUTROABS 4.00 02/09/2021    EOSABS 0.20 02/09/2021    BASOSABS 0.10 02/09/2021         DIAGNOSTIC TESTING:     No results found. IMPRESSION: Mr.Hector Bety Manzano is a 42 y.o. male with a past history remarkable for family history of colorectal cancer, father was diagnosed with colorectal cancer several years ago, previous colonoscopy performed in 2009 which failed to reveal any evidence of precancerous lesions or polyps, referred for evaluation of surveillance colonoscopy. Patient denies any prior left rectum, hematochezia, no melena. Denies any changes in appetite or weight changes. Non-smoker nondrinker. Assessment  1. Gastroesophageal reflux disease, unspecified whether esophagitis present    2. Family history of colon cancer in father        Rhett Adams was seen today for new patient, constipation, heartburn and other. Diagnoses and all orders for this visit:    Gastroesophageal reflux disease, unspecified whether esophagitis present-patient to be placed on the low-dose of PPI therapy, plan for diagnostic upper endoscopy. Advised dietary modifications.  -     EGD; Future  -     COLONOSCOPY (Screening); Future    Family history of colon cancer in father-plan for surveillance colonoscopy prior colonoscopy in 2009 which was negative. Risk, benefits, alternatives discussed with the patient. Patient GERD she agreed to proceed with the procedure.   Indication for procedure is fully discussed with the patient in his native language of [place]. Other orders  -     omeprazole (PRILOSEC OTC) 20 MG tablet; Take 1 tablet by mouth daily         RTC: After procedures. Additional comments: Thank you for allowing me to participate in the care of Mr. Eda Gruber. For any further questions please do not hesitate to contact me. I have reviewed and agree with the MA/LPN ROS please refer to their documentation from today's encounter on a separate note. Silvestre Rene MD, MPH   Board Certified in Gastroenterology  Board Certified in [address] [place] [address] #: [phone]          this note is created with the assistance of a speech recognition program.  While intending to generate a document that actually reflects the content of the visit, the document can still have some errors including those of syntax and sound a like substitutions which may escape proof reading. It such instances, actual meaning can be extrapolated by contextual diversion.

## 2022-05-05 RX ORDER — POLYETHYLENE GLYCOL 3350 17 G/17G
POWDER, FOR SOLUTION ORAL
Qty: 238 G | Refills: 0 | Status: SHIPPED | OUTPATIENT
Start: 2022-05-05 | End: 2022-07-12 | Stop reason: ALTCHOICE

## 2022-05-05 RX ORDER — BISACODYL 5 MG
TABLET, DELAYED RELEASE (ENTERIC COATED) ORAL
Qty: 4 TABLET | Refills: 0 | Status: SHIPPED | OUTPATIENT
Start: 2022-05-05 | End: 2022-07-12 | Stop reason: ALTCHOICE

## 2022-05-09 ENCOUNTER — ANESTHESIA EVENT (OUTPATIENT)
Dept: OPERATING ROOM | Age: 42
End: 2022-05-09
Payer: COMMERCIAL

## 2022-05-10 ENCOUNTER — ANESTHESIA (OUTPATIENT)
Dept: OPERATING ROOM | Age: 42
End: 2022-05-10
Payer: COMMERCIAL

## 2022-05-10 ENCOUNTER — HOSPITAL ENCOUNTER (OUTPATIENT)
Age: 42
Setting detail: OUTPATIENT SURGERY
Discharge: HOME | End: 2022-05-10
Attending: INTERNAL MEDICINE | Admitting: INTERNAL MEDICINE
Payer: COMMERCIAL

## 2022-05-10 VITALS
WEIGHT: 208 LBS | DIASTOLIC BLOOD PRESSURE: 81 MMHG | OXYGEN SATURATION: 100 % | RESPIRATION RATE: 15 BRPM | TEMPERATURE: 97 F | HEART RATE: 68 BPM | BODY MASS INDEX: 30.81 KG/M2 | HEIGHT: 69 IN | SYSTOLIC BLOOD PRESSURE: 112 MMHG

## 2022-05-10 VITALS
RESPIRATION RATE: 12 BRPM | SYSTOLIC BLOOD PRESSURE: 87 MMHG | DIASTOLIC BLOOD PRESSURE: 51 MMHG | OXYGEN SATURATION: 99 %

## 2022-05-10 PROCEDURE — 7100000001 HC PACU RECOVERY - ADDTL 15 MIN: Performed by: INTERNAL MEDICINE

## 2022-05-10 PROCEDURE — 43239 EGD BIOPSY SINGLE/MULTIPLE: CPT | Performed by: INTERNAL MEDICINE

## 2022-05-10 PROCEDURE — 88305 TISSUE EXAM BY PATHOLOGIST: CPT

## 2022-05-10 PROCEDURE — 3700000000 HC ANESTHESIA ATTENDED CARE: Performed by: INTERNAL MEDICINE

## 2022-05-10 PROCEDURE — 3609027000 HC COLONOSCOPY: Performed by: INTERNAL MEDICINE

## 2022-05-10 PROCEDURE — 7100000011 HC PHASE II RECOVERY - ADDTL 15 MIN: Performed by: INTERNAL MEDICINE

## 2022-05-10 PROCEDURE — 45378 DIAGNOSTIC COLONOSCOPY: CPT | Performed by: INTERNAL MEDICINE

## 2022-05-10 PROCEDURE — 7100000010 HC PHASE II RECOVERY - FIRST 15 MIN: Performed by: INTERNAL MEDICINE

## 2022-05-10 PROCEDURE — 7100000000 HC PACU RECOVERY - FIRST 15 MIN: Performed by: INTERNAL MEDICINE

## 2022-05-10 PROCEDURE — 6360000002 HC RX W HCPCS: Performed by: NURSE ANESTHETIST, CERTIFIED REGISTERED

## 2022-05-10 PROCEDURE — 2709999900 HC NON-CHARGEABLE SUPPLY: Performed by: INTERNAL MEDICINE

## 2022-05-10 PROCEDURE — 3700000001 HC ADD 15 MINUTES (ANESTHESIA): Performed by: INTERNAL MEDICINE

## 2022-05-10 PROCEDURE — 2580000003 HC RX 258: Performed by: ANESTHESIOLOGY

## 2022-05-10 PROCEDURE — 3609012400 HC EGD TRANSORAL BIOPSY SINGLE/MULTIPLE: Performed by: INTERNAL MEDICINE

## 2022-05-10 PROCEDURE — 2500000003 HC RX 250 WO HCPCS: Performed by: NURSE ANESTHETIST, CERTIFIED REGISTERED

## 2022-05-10 RX ORDER — SODIUM CHLORIDE, SODIUM LACTATE, POTASSIUM CHLORIDE, CALCIUM CHLORIDE 600; 310; 30; 20 MG/100ML; MG/100ML; MG/100ML; MG/100ML
INJECTION, SOLUTION INTRAVENOUS CONTINUOUS
Status: DISCONTINUED | OUTPATIENT
Start: 2022-05-10 | End: 2022-05-10 | Stop reason: HOSPADM

## 2022-05-10 RX ORDER — SODIUM CHLORIDE 9 MG/ML
25 INJECTION, SOLUTION INTRAVENOUS PRN
Status: CANCELLED | OUTPATIENT
Start: 2022-05-10

## 2022-05-10 RX ORDER — LIDOCAINE HYDROCHLORIDE 10 MG/ML
INJECTION, SOLUTION INFILTRATION; PERINEURAL PRN
Status: DISCONTINUED | OUTPATIENT
Start: 2022-05-10 | End: 2022-05-10 | Stop reason: SDUPTHER

## 2022-05-10 RX ORDER — DIPHENHYDRAMINE HYDROCHLORIDE 50 MG/ML
12.5 INJECTION INTRAMUSCULAR; INTRAVENOUS
Status: CANCELLED | OUTPATIENT
Start: 2022-05-10 | End: 2022-05-10

## 2022-05-10 RX ORDER — ONDANSETRON 2 MG/ML
4 INJECTION INTRAMUSCULAR; INTRAVENOUS
Status: CANCELLED | OUTPATIENT
Start: 2022-05-10 | End: 2022-05-10

## 2022-05-10 RX ORDER — PROPOFOL 10 MG/ML
INJECTION, EMULSION INTRAVENOUS PRN
Status: DISCONTINUED | OUTPATIENT
Start: 2022-05-10 | End: 2022-05-10 | Stop reason: SDUPTHER

## 2022-05-10 RX ORDER — SODIUM CHLORIDE 0.9 % (FLUSH) 0.9 %
5-40 SYRINGE (ML) INJECTION PRN
Status: CANCELLED | OUTPATIENT
Start: 2022-05-10

## 2022-05-10 RX ORDER — SODIUM CHLORIDE 0.9 % (FLUSH) 0.9 %
5-40 SYRINGE (ML) INJECTION PRN
Status: DISCONTINUED | OUTPATIENT
Start: 2022-05-10 | End: 2022-05-10 | Stop reason: HOSPADM

## 2022-05-10 RX ORDER — LIDOCAINE HYDROCHLORIDE 10 MG/ML
1 INJECTION, SOLUTION EPIDURAL; INFILTRATION; INTRACAUDAL; PERINEURAL
Status: DISCONTINUED | OUTPATIENT
Start: 2022-05-10 | End: 2022-05-10 | Stop reason: HOSPADM

## 2022-05-10 RX ORDER — MORPHINE SULFATE 1 MG/ML
1 INJECTION, SOLUTION EPIDURAL; INTRATHECAL; INTRAVENOUS EVERY 5 MIN PRN
Status: CANCELLED | OUTPATIENT
Start: 2022-05-10

## 2022-05-10 RX ORDER — MEPERIDINE HYDROCHLORIDE 50 MG/ML
12.5 INJECTION INTRAMUSCULAR; INTRAVENOUS; SUBCUTANEOUS ONCE
Status: CANCELLED | OUTPATIENT
Start: 2022-05-10 | End: 2022-05-10

## 2022-05-10 RX ORDER — SODIUM CHLORIDE 0.9 % (FLUSH) 0.9 %
5-40 SYRINGE (ML) INJECTION EVERY 12 HOURS SCHEDULED
Status: DISCONTINUED | OUTPATIENT
Start: 2022-05-10 | End: 2022-05-10 | Stop reason: HOSPADM

## 2022-05-10 RX ORDER — SODIUM CHLORIDE 9 MG/ML
INJECTION, SOLUTION INTRAVENOUS PRN
Status: DISCONTINUED | OUTPATIENT
Start: 2022-05-10 | End: 2022-05-10 | Stop reason: HOSPADM

## 2022-05-10 RX ORDER — SODIUM CHLORIDE 0.9 % (FLUSH) 0.9 %
5-40 SYRINGE (ML) INJECTION EVERY 12 HOURS SCHEDULED
Status: CANCELLED | OUTPATIENT
Start: 2022-05-10

## 2022-05-10 RX ADMIN — LIDOCAINE HYDROCHLORIDE 50 MG: 10 INJECTION, SOLUTION INFILTRATION; PERINEURAL at 09:37

## 2022-05-10 RX ADMIN — SODIUM CHLORIDE, POTASSIUM CHLORIDE, SODIUM LACTATE AND CALCIUM CHLORIDE: 600; 310; 30; 20 INJECTION, SOLUTION INTRAVENOUS at 08:41

## 2022-05-10 RX ADMIN — PROPOFOL 50 MG: 10 INJECTION, EMULSION INTRAVENOUS at 09:45

## 2022-05-10 RX ADMIN — PROPOFOL 100 MG: 10 INJECTION, EMULSION INTRAVENOUS at 09:37

## 2022-05-10 RX ADMIN — PROPOFOL 50 MG: 10 INJECTION, EMULSION INTRAVENOUS at 09:40

## 2022-05-10 RX ADMIN — PROPOFOL 50 MG: 10 INJECTION, EMULSION INTRAVENOUS at 09:50

## 2022-05-10 ASSESSMENT — PULMONARY FUNCTION TESTS
PIF_VALUE: 0
PIF_VALUE: 1
PIF_VALUE: 0
PIF_VALUE: 1
PIF_VALUE: 0
PIF_VALUE: 1
PIF_VALUE: 0
PIF_VALUE: 1

## 2022-05-10 ASSESSMENT — PAIN - FUNCTIONAL ASSESSMENT: PAIN_FUNCTIONAL_ASSESSMENT: NONE - DENIES PAIN

## 2022-05-10 NOTE — OP NOTE
Operative Note      Patient: Carolee Cruz  YOB: 1980  MRN: [id number]    Date of Procedure: 5/10/2022    Pre-Op Diagnosis: GERD, ESOPHAGITIS, DYSPEPSIA, SCREEN    Post-Op Diagnosis: Mild gastritis, hemorrhoids, FAIR PREP       Procedure(s):  EGD BIOPSY  COLORECTAL CANCER SCREENING, NOT HIGH RISK    Surgeon(s):  Uday Grajeda MD    Assistant:   * No surgical staff found *    Anesthesia: Monitor Anesthesia Care    Estimated Blood Loss (mL): Minimal    Complications: None    Specimens:   ID Type Source Tests Collected by Time Destination   A : stomach R/O H. Pylori Tissue Stomach SURGICAL PATHOLOGY Uday Grajeda MD 5/10/2022 1369        Implants:  * No implants in log *      Drains: * No LDAs found *          GASTROENTEROLOGY    [facility]    EGD    PROCEDURE DATE: 05/10/22    REFERRING PHYSICIAN: No ref. provider found     PRIMARY CARE PROVIDER: Keyla Sahni APRN - CNP    ATTENDING PHYSICIAN: Uday Grajeda MD     HISTORY: Mr. Carolee Cruz is a 42 y.o. male who presents to the  Endoscopy unit for upper endoscopy. The patient's clinical history is remarkable for FH of CRC, intemrittent dyspepsia and constipation, referred for diagnostic EGD. He is currently medically stable and appropriate for the planned procedure. PREOPERATIVE DIAGNOSIS: Dyspepsia. PROCEDURES:   1) Transoral Upper Endoscopy with cold biopsy. POSTOPERATIVE DIAGNOSIS:     1) Mild non-specific erythema in the antrum and gastric body, mild gastritis. Cold biopsy taken  2) Normal appearing esophageal and duodenal mucosa     MEDICATIONS:   MAC per anesthesia     EBL: <10cc    INSTRUMENT: Olympus GIF-H190  flexible Gastroscope. PREPARATION: The nature and character of the procedure as well as risks, benefits, and alternatives were discussed with the patient and informed consent was obtained.  Complications were said to include, but were not limited to: medication allergy, medication reaction, cardiovascular and respiratory problems, bleeding, perforation, infection, and/or missed diagnosis. Following arrival in the endoscopy room, the patient was placed in the left lateral decubitus position and final time-out accomplished in the presence of the nursing staff. Baseline vital signs were obtained and reviewed, and IV sedation was subsequently initiated. FINDINGS:   Esophagus: The esophagus was inspected to the Z-line. The endoscopic exam showed normal appearing esophageal mucosa and GEJ    Stomach: The stomach was inspected in both forward and retroflex fashion and was appropriately distensible. The cardia, fundus, incisura, antrum and pylorus were identified via direct visualization. The endoscopic exam showed mild gastritis. Duodenum: The proximal small bowel was inspected through the bulb, sweep, and second portion of the duodenum. The endoscopic exam showed normal appearing mucosa. IMPRESSION:    1) Mild non-specific erythema in the antrum and gastric body, mild gastritis. Cold biopsy taken  2) Normal appearing esophageal and duodenal mucosa       RECOMMENDATIONS:   1) Follow up path in GI clinic. Continue with anti-reflux therapy  2) Proceed with colonoscopy       [facility]  Gastroenterology   05/10/22    this note is created with the assistance of a speech recognition program.  While intending to generate a document that actually reflects the content of the visit, the document can still have some errors including those of syntax and sound a like substitutions which may escape proof reading. It such instances, actual meaning can be extrapolated by contextual diversion. The patient was counseled at length about the risks of chapis Covid-19 during their perioperative period and any recovery window from their procedure.   The patient was made aware that chapis Covid-19  may worsen their prognosis for recovering from their procedure  and lend to a higher morbidity and/or mortality risk. All material risks, benefits, and reasonable alternatives including postponing the procedure were discussed. The patient DOES wish to proceed with the procedure at this time. GASTROENTEROLOGY     [facility]     COLONOSCOPY    PROCEDURE DATE: 05/10/22    REFERRING PHYSICIAN: No ref. provider found     PRIMARY CARE PROVIDER: PEPE Daniels - CNP    ATTENDING PHYSICIAN: Simon Kaba MD     HISTORY: Mr. Kathy Davis is a 42 y.o. male who presents to the  endoscopy unit for colonoscopy. The patient's clinical history is remarkable for history as detailed above. He is currently medically stable and appropriate for the planned procedure. PREOPERATIVE DIAGNOSIS: family history of colon cancer, screening for colon cancer. PROCEDURES:   Transanal Colonoscopy --screening. POSTPROCEDURE DIAGNOSIS:    FAIR to ADEQUATE PREP    1) No large lesions or polyps identified  2) Mild to moderate external hemorrhoids    MEDICATIONS:     MAC per anesthesia     EBL <10cc      INSTRUMENT: Olympus CF-H180 AL Pediatric flexible Colonoscope. PREPARATION: The nature and character of the procedure as well as risks, benefits, and alternatives were discussed with the patient and informed consent was obtained. Complications were said to include, but were not limited to: medication allergy, medication reaction, cardiovascular and respiratory problems, bleeding, perforation, infection, and/or missed diagnosis. Following arrival in the endoscopy room, the patient was placed in the left lateral decubitus position and final time-out accomplished in the presence of the nursing staff. Baseline vital signs were obtained and reviewed, and IV sedation was subsequently initiated. FINDINGS: Rectal examination demonstrated no significant visible external abnormality and digital palpation was unremarkable.  Following adequate conscious sedation the colonoscope was introduced and advanced under direct visualization to the cecum, which was identified by the ileocecal valve and appendiceal orifice. The bowel preparation was felt to be FAIR to ADEQUATE. This included moderate amounts of green stool that was mostly able to be adequately irrigated and aspirated. Cecal intubation time was 8 minutes. Once maximally inserted, the endoscope was withdrawn and the mucosa was carefully inspected. The mucosal exam was revealed no large lesions or polyps. Retroflexion was performed in the rectum and moderate hemorrhoids. Withdrawal time was 16 minutes. IMPRESSION:     FAIR to ADEQUATE PREP    1) No large lesions or polyps identified  2) Mild to moderate external hemorrhoids      RECOMMENDATIONS:   1) Follow up with referring provider, as previously scheduled. 2) Repeat Colonoscopy in 5 yrs due to family history  3) Follow up UGI path in GI clinic        [address] [place] [address]  Gastroenterology   05/10/22    This note is created with the assistance of a speech recognition program.  While intending to generate a document that actually reflects the content of the visit, the document can still have some errors including those of syntax and sound a like substitutions which may escape proof reading. It such instances, actual meaning can be extrapolated by contextual diversion. The patient was counseled at length about the risks of chapis Covid-19 during their perioperative period and any recovery window from their procedure. The patient was made aware that chapis Covid-19  may worsen their prognosis for recovering from their procedure  and lend to a higher morbidity and/or mortality risk. All material risks, benefits, and reasonable alternatives including postponing the procedure were discussed. The patient DOES wish to proceed with the procedure at this time.       Electronically signed by Rohini Cui MD on 5/10/2022 at 9:57 AM

## 2022-05-10 NOTE — ANESTHESIA POSTPROCEDURE EVALUATION
POST- ANESTHESIA EVALUATION       Pt Name: Luz Elena Goodman  MRN: [id number]  YOB: 1980  Date of evaluation: 5/10/2022  Time:  11:24 AM      /81   Pulse 68   Temp 97 °F (36.1 °C) (Skin)   Resp 15   Ht 5' 9\" (1.753 m)   Wt 208 lb (94.3 kg)   SpO2 100%   BMI 30.72 kg/m²      Consciousness Level  Awake  Cardiopulmonary Status  Stable  Pain Adequately Treated YES  Nausea / Vomiting  NO  Adequate Hydration  YES  Anesthesia Related Complications NONE      Electronically signed by Kirby Gibbons MD on 5/10/2022 at 11:24 AM       Department of Anesthesiology  Postprocedure Note    Patient: Luz Elena Goodman  MRN: [id number]  YOB: 1980  Date of evaluation: 5/10/2022  Time:  11:24 AM     Procedure Summary     Date: 05/10/22 Room / Location: [address]    Anesthesia Start: 6696 Anesthesia Stop: 1001    Procedures:       EGD BIOPSY (N/A )      COLORECTAL CANCER SCREENING, NOT HIGH RISK (N/A ) Diagnosis: (GERD, ESOPHAGITIS, DYSPEPSIA, SCREEN)    Surgeons: Alistair Degroot MD Responsible Provider: Kirby Gibbons MD    Anesthesia Type: MAC ASA Status: 2          Anesthesia Type: No value filed. Randolph Phase I: Randolph Score: 4    Randolph Phase II: Randolph Score: 10    Last vitals: Reviewed and per EMR flowsheets.        Anesthesia Post Evaluation [place] affected by breech delivery

## 2022-05-10 NOTE — ANESTHESIA PRE PROCEDURE
Department of Anesthesiology  Preprocedure Note       Name:  Hector Colmenares   Age:  42 y.o.  :  1980                                          MRN:  [id number]         Date:  5/10/2022      Surgeon: Lavelle Victoria):  Liv Gates MD    Procedure: Procedure(s):  EGD BIOPSY  COLORECTAL CANCER SCREENING, NOT HIGH RISK    Medications prior to admission:   Prior to Admission medications    Medication Sig Start Date End Date Taking? Authorizing Provider   polyethylene glycol (GLYCOLAX) 17 GM/SCOOP powder Take as directed for bowel prep 22   Liv Gates MD   bisacodyl (BISACODYL) 5 MG EC tablet Take as directed for bowel prep 22   Liv Gates MD   omeprazole (PRILOSEC OTC) 20 MG tablet Take 1 tablet by mouth daily 22   Liv Gates MD   pitavastatin (LIVALO) 1 MG TABS tablet Take 1 tablet by mouth nightly 22   Amado Alvarado MD   hydrocortisone 2.5 % ointment Apply topically 2 times daily. 22   Amado Alvarado MD   ibuprofen (ADVIL;MOTRIN) 800 MG tablet Take 1 tablet by mouth every 8 hours as needed for Pain 21  Abrahan Peeling, APRN - CNP   triamcinolone (KENALOG) 0.1 % cream Apply topically 2 times daily, until rash gone 21   Abrahan Peeling, APRN - CNP   melatonin 1 MG tablet Take 1 tablet by mouth nightly 21   PEPE Patterson CNP   Cholecalciferol (VITAMIN D3) 25 MCG (1000 UT) TABS TAKE 1 TABLET BY MOUTH DAILY 4/15/20   PEPE Patterson CNP       Current medications:    No current facility-administered medications for this encounter.        Allergies:  No Known Allergies    Problem List:    Patient Active Problem List   Diagnosis Code    Family history of diabetes mellitus Z83.3    Constipation K59.00    Obesity, Class I, BMI 30-34.9 E66.9    Vitamin D deficiency E55.9    Yeast dermatitis B37.2    Fatigue R53.83    Adjustment insomnia F51.02    Shift work sleep disorder G47.26    Mixed hyperlipidemia E78.2    Dermatitis L30.9 Past Medical History:        Diagnosis Date    GERD (gastroesophageal reflux disease)     Irritable bowel syndrome        Past Surgical History:        Procedure Laterality Date    COLONOSCOPY      UPPER GASTROINTESTINAL ENDOSCOPY  08/10/2009       Social History:    Social History     Tobacco Use    Smoking status: Never Smoker    Smokeless tobacco: Never Used   Substance Use Topics    Alcohol use: Yes     Alcohol/week: 0.0 standard drinks     Comment: occasionally                                Counseling given: Not Answered      Vital Signs (Current):   Vitals:    05/10/22 0821   BP: 121/79   Pulse: 69   Resp: 15   Temp: 97.4 °F (36.3 °C)   TempSrc: Infrared   SpO2: 99%   Weight: 208 lb (94.3 kg)   Height: 5' 9\" (1.753 m)                                              BP Readings from Last 3 Encounters:   05/10/22 121/79   05/04/22 128/84   04/12/22 118/82       NPO Status: Time of last liquid consumption: 2100                        Time of last solid consumption: 2100                        Date of last liquid consumption: 05/09/22                        Date of last solid food consumption: 05/08/22    BMI:   Wt Readings from Last 3 Encounters:   05/10/22 208 lb (94.3 kg)   05/04/22 214 lb (97.1 kg)   04/12/22 215 lb (97.5 kg)     Body mass index is 30.72 kg/m².     CBC:   Lab Results   Component Value Date    WBC 6.6 04/14/2022    RBC 5.13 04/14/2022    HGB 15.5 04/14/2022    HCT 45.4 04/14/2022    MCV 88.5 04/14/2022    RDW 13.2 04/14/2022     04/14/2022       CMP:   Lab Results   Component Value Date     04/14/2022    K 4.7 04/14/2022     04/14/2022    CO2 27 04/14/2022    BUN 15 04/14/2022    CREATININE 0.77 04/14/2022    GFRAA >60 04/14/2022    LABGLOM >60 04/14/2022    GLUCOSE 95 04/14/2022    PROT 7.5 04/14/2022    CALCIUM 9.4 04/14/2022    BILITOT 0.56 04/14/2022    ALKPHOS 77 04/14/2022    AST 26 04/14/2022    ALT 35 04/14/2022       POC Tests: No results for input(s): POCGLU, POCNA, POCK, POCCL, POCBUN, POCHEMO, POCHCT in the last 72 hours. Coags: No results found for: PROTIME, INR, APTT    HCG (If Applicable): No results found for: PREGTESTUR, PREGSERUM, HCG, HCGQUANT     ABGs: No results found for: PHART, PO2ART, VKZ4DRQ, BTQ6HNG, BEART, Q6LPCJMB     Type & Screen (If Applicable):  No results found for: LABABO, LABRH    Drug/Infectious Status (If Applicable):  Lab Results   Component Value Date    HEPCAB NONREACTIVE 02/09/2021       COVID-19 Screening (If Applicable): No results found for: COVID19        Anesthesia Evaluation  Patient summary reviewed and Nursing notes reviewed no history of anesthetic complications:   Airway: Mallampati: II  TM distance: >3 FB   Neck ROM: full  Mouth opening: > = 3 FB Dental: normal exam         Pulmonary:Negative Pulmonary ROS and normal exam  breath sounds clear to auscultation                             Cardiovascular:    (+) hyperlipidemia        Rhythm: regular  Rate: normal                    Neuro/Psych:   Negative Neuro/Psych ROS              GI/Hepatic/Renal:   (+) GERD: no interval change, bowel prep,           Endo/Other: Negative Endo/Other ROS                    Abdominal:       Abdomen: soft. Vascular: negative vascular ROS. Other Findings:             Anesthesia Plan      MAC     ASA 2             Anesthetic plan and risks discussed with patient. Plan discussed with CRNA.                   Axel Vargas MD   5/10/2022

## 2022-05-10 NOTE — H&P
Procedure History and Physical    Pre-Procedural Diagnosis:  Dyspepsia, CR screening, family history of colon CA    Indications:  same    Procedure Planned: endoscopy and colonoscopy     History Obtained From:  patient    HISTORY OF PRESENT ILLNESS:       The patient is a 42 y.o. male who presents for the above procedure. Past Medical History:    Past Medical History:   Diagnosis Date    GERD (gastroesophageal reflux disease)     Irritable bowel syndrome        Past Surgical History:    Past Surgical History:   Procedure Laterality Date    COLONOSCOPY      UPPER GASTROINTESTINAL ENDOSCOPY  08/10/2009       Medications:  No current facility-administered medications for this encounter. Allergies:   No Known Allergies              Social   Social History     Tobacco Use    Smoking status: Never Smoker    Smokeless tobacco: Never Used   Substance Use Topics    Alcohol use: Yes     Alcohol/week: 0.0 standard drinks     Comment: occasionally        PSYCH HISTORY:  Depression No  Anxiety No  Suicide No       Family History   Problem Relation Age of Onset    Hypertension Mother     Diabetes Father     Cancer Father         colon  death age 71      No family history of colon cancer, Crohn's disease, or ulcerative colitis    Problems with Sedation/Anesthesia in the past? no    REVIEW OF SYSTEMS:  12 point review of systems negative other than mentioned above. PHYSICAL EXAM:    Vitals: There were no vitals taken for this visit.     Focused Exam related to procedure:    General appearance: NAD, conversant   Eyes: anicteric sclerae, moist conjunctivae; no lid-lag; PERRLA   Lungs: CTA, with normal respiratory effort and no intercostal retractions   CV: RRR, no MRGs   Abdomen: Soft, non-tender; no masses or HSM   Skin: Normal temperature, turgor and texture; no rash, ulcers or subcutaneous nodules     DATA:  CBC:   Lab Results   Component Value Date    WBC 6.6 04/14/2022    HGB 15.5 04/14/2022    HCT 45.4 04/14/2022    MCV 88.5 04/14/2022     04/14/2022     BUN/Cr:   Lab Results   Component Value Date    BUN 15 04/14/2022   ,   Lab Results   Component Value Date    CREATININE 0.77 04/14/2022     Potassium:   Lab Results   Component Value Date    K 4.7 04/14/2022     PT/INR: No results found for: INR, PROTIME    ASSESSMENT AND PLAN:       1. Patient is a 42 y.o. male with above specified procedure planned. Expected Sedation/Anesthesia Type: MAC    2. ASA ([address] Anesthesiology) Anesthesia Status: Class 2 - A normal healthy patient with mild systemic disease    3. Mallampati: II (soft palate, uvula, fauces visible)  4. Procedure options, risks and benefits reviewed with Patient. Patient expresses understanding.     5.  Consent has been signed:  Yes    Iwona Castanon MD

## 2022-05-11 LAB — SURGICAL PATHOLOGY REPORT: NORMAL

## 2022-07-01 ENCOUNTER — TELEPHONE (OUTPATIENT)
Dept: FAMILY MEDICINE CLINIC | Age: 42
End: 2022-07-01

## 2022-07-01 NOTE — TELEPHONE ENCOUNTER
Patient called because he has been experiencing some L upper leg pain and he states that the pain is happening more often and it also feels like his leg is \"sleeping\" at times. He has an appointment on 07/12/22 but didn't know if he should wait that long. Please advise.

## 2022-07-05 NOTE — TELEPHONE ENCOUNTER
Called pt and gave him message, verbalized understanding will go to ED if pain is worsening. Will also keep his appt on 07/12/22.

## 2022-07-10 ASSESSMENT — ENCOUNTER SYMPTOMS
ABDOMINAL PAIN: 0
COUGH: 0
NAUSEA: 0
RESPIRATORY NEGATIVE: 1
SORE THROAT: 0
SHORTNESS OF BREATH: 0
APNEA: 0
CHEST TIGHTNESS: 0
DIARRHEA: 0
EYE PAIN: 0
TROUBLE SWALLOWING: 0
COLOR CHANGE: 0
VOMITING: 0

## 2022-07-10 NOTE — PROGRESS NOTES
[address]  [facility]  [address] [place] [address]  Dept: [address] (:  1980) is a 42 y.o. male. Patient is here for evaluation of the following chief complaint(s):  Chief Complaint   Patient presents with    Leg Pain     LEFT // NOTICED FOR A WHILE BUT 2 WEEKS AGO PAIN HAS BEEN BOTHERING HIM MORE/TRIED IBUPROFEN HELPED SOME    Fatigue     NOT ANY BETTER        SUBJECTIVE/OBJECTIVE:  YOLA Baltazar is a 42 y.o. male patient. Patient is an established patient of mine. Patient has a known history of SWD, Insomnia, hyperlipidemia, constipation, vitamin d deficiency and obesity. BACK PAIN  Non traumatic. 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 Positive left leg radiation. No pain when laying down. He noticed some muscle aches in the left hip/thigh and gluteal region after walking a lot. He denies cervical, thoracic or midline lumbar back pain. The pain in anterior and lateral hip area, with radiation down the leg to the foot. He has tried tylenol with some relief. We will try naproxen and tizanidine. Xray ordered. Encouraged light stretching. SHIFT WORK SLEEP DISORDER/INSOMNIA/FATIGUE- melatonin helps to some degree. He sometimes takes 2 tablets. He likes to take it 2-3 hours before he goes to bed so that he is sleepy by that time. He does wake up during the night sometimes. He states that sometimes after work he wants to do things but does not have the energy to do it. He has not have a b12 or folate level checked. Blood count and TSH was normal in April. Will check a b12 and folate level. Patient works [employer] and does shiftwork. He c/o some hypersomnia and insomnia. He was given some melatonin with mild success. Dose was increased to 2 mg. Patient reports better success.    He was given a prescription for modafinil but was not able to fill due to insurance. GASTRITIS  Did not discuss much today    Ashtyn Porter is currently on Livalo- does not take. Had some body aches with pravastatin. Patient denies adverse reaction to this medication. Compliance with treatment thus far has been good. The patient is not known to have coexisting coronary artery disease. The 10-year CVD risk score (Yuridia, et al., 2008) is: 4.7%    Values used to calculate the score:      Age: 42 years      Sex: Male      Diabetic: No      Tobacco smoker: No      Systolic Blood Pressure: 972 mmHg      Is BP treated: No      HDL Cholesterol: 47 mg/dL      Total Cholesterol: 201 mg/dL  Lab Results   Component Value Date/Time    CHOLFAST 201 (H) 04/14/2022 09:04 AM    CHOL 165 07/21/2018 07:47 AM    HDL 47 04/14/2022 09:04 AM    LDLCHOLESTEROL 130 04/14/2022 09:04 AM    TRIGLYCFAST 122 04/14/2022 09:04 AM    CHOLHDLRATIO 4.3 04/14/2022 09:04 AM    TRIG 48 07/21/2018 07:47 AM    VLDL NOT REPORTED (H) 02/09/2021 09:56 AM     VITAMIN D  Patient has a known Vitamin D Deficiency. he had a course of supplementation for 12 weeks. he is due to get levels check. We continue to discuss ways to manage this condition. We also discussed ways to improve the levels. Such as learning food groups that are high in Vitamin D. We also discuss that sun exposure is beneficial however, too much sun and sun damage can potentially result in skin cancer. Lab Results   Component Value Date/Time    VITD25 29.4 (L) 04/14/2022 09:04 AM      OBESITY  Patient's BMI is Body mass index is 30.19 kg/m². kg/m2. BMI is increasing. Patient understands that this condition increases the patient's risk for chronic conditions. Patient advised to practice healthy eating habits. Diet should include plenty of fruits, vegetables, whole grains, lean protein, and low-fat dairy. Increase water consumption. Reduce consumption of dietary sugar and sugar-sweetened beverages.  Increasing physical exercises at least 3-4 times a week. We will monitor progression of condition. WEIGHT  Patient's BMI is Body mass index is 30.19 kg/m². kg/m2. BMI is increasing. Patient understands that this condition increases the patient's risk for chronic conditions. Wt Readings from Last 3 Encounters:   07/12/22 210 lb 6.4 oz (95.4 kg)   05/10/22 208 lb (94.3 kg)   05/04/22 214 lb (97.1 kg)       Patient's blood count is WNL. Lab Results   Component Value Date    WBC 6.6 04/14/2022    HGB 15.5 04/14/2022    HCT 45.4 04/14/2022    MCV 88.5 04/14/2022     04/14/2022    LYMPHOPCT 30 02/09/2021    RBC 5.13 04/14/2022    MCH 30.3 04/14/2022    MCHC 34.2 04/14/2022    RDW 13.2 04/14/2022       THYROID FUNCTION  Margareth Artis  's most recent TSH level was normal. Results reviewed with the patient. Patient denies any history of thyroid disorders. Lab Results   Component Value Date/Time    TSH 0.47 04/14/2022 09:04 AM        Nitish Borjas 's RENAL FUNCTION was found WNL. Lab Results   Component Value Date     04/14/2022    K 4.7 04/14/2022     04/14/2022    CO2 27 04/14/2022    BUN 15 04/14/2022    CREATININE 0.77 04/14/2022    GLUCOSE 95 04/14/2022    CALCIUM 9.4 04/14/2022    PROT 7.5 04/14/2022    GFR      04/14/2022       LFT's =  Lab Results   Component Value Date/Time    ALKPHOS 77 04/14/2022 09:04 AM    ALT 35 04/14/2022 09:04 AM    AST 26 04/14/2022 09:04 AM    BILITOT 0.56 04/14/2022 09:04 AM    PROT 7.5 04/14/2022 09:04 AM    LABALBU 4.3 04/14/2022 09:04 AM     VITAL SIGNS:  Vitals:    07/12/22 1515   BP: 122/76   Pulse: 75   Temp: 97.6 °F (36.4 °C)   SpO2: 97%   Weight: 210 lb 6.4 oz (95.4 kg)   Height: 5' 10\" (1.778 m)   Estimated body mass index is 30.19 kg/m² as calculated from the following:    Height as of this encounter: 5' 10\" (1.778 m). Weight as of this encounter: 210 lb 6.4 oz (95.4 kg). Review of Systems   Constitutional: Positive for activity change and fatigue. Negative for chills and fever.    HENT: Negative for congestion, ear pain, sore throat and trouble swallowing. Eyes: Negative for pain and visual disturbance. Respiratory: Negative. Negative for apnea, cough, chest tightness and shortness of breath. Cardiovascular: Negative. Gastrointestinal: Negative for abdominal pain, diarrhea, nausea and vomiting. Endocrine: Negative for cold intolerance and heat intolerance. Genitourinary: Negative for difficulty urinating, dysuria, frequency and genital sores. Musculoskeletal: Positive for arthralgias, gait problem and myalgias. Negative for neck pain. Leg, hip, back leg pain   Skin: Negative for color change and rash. Neurological: Negative for weakness, light-headedness and headaches. Psychiatric/Behavioral: Negative for agitation, behavioral problems, decreased concentration, sleep disturbance and suicidal ideas. The patient is nervous/anxious. Physical Exam  Vitals and nursing note reviewed. Constitutional:       Appearance: He is well-developed. He is obese. HENT:      Head: Normocephalic. Right Ear: External ear normal.      Left Ear: External ear normal.      Nose: Nose normal.   Eyes:      Conjunctiva/sclera: Conjunctivae normal.      Pupils: Pupils are equal, round, and reactive to light. Cardiovascular:      Rate and Rhythm: Normal rate. Pulses: Normal pulses. Pulmonary:      Effort: Pulmonary effort is normal.      Breath sounds: Normal breath sounds. Abdominal:      General: Abdomen is protuberant. Bowel sounds are normal. There is no distension. Palpations: Abdomen is soft. Tenderness: There is no abdominal tenderness. Hernia: No hernia is present. Musculoskeletal:      Cervical back: Normal range of motion. Lumbar back: Positive left straight leg raise test.      Left hip: Tenderness present. Decreased range of motion. Left foot: No swelling. Skin:     General: Skin is warm and dry.       Capillary Refill: Capillary refill takes less than 2 seconds. Neurological:      Mental Status: He is alert and oriented to person, place, and time. Sensory: Sensory deficit present. Comments: Decreased sensation down left leg   Psychiatric:         Mood and Affect: Mood is anxious. Speech: Speech is not rapid and pressured. Thought Content: Thought content does not include homicidal or suicidal ideation. MEDICAL HISTORY      Diagnosis Date    GERD (gastroesophageal reflux disease)     Irritable bowel syndrome       MEDICATIONS  Prior to Visit Medications    Medication Sig Taking? Authorizing Provider   omeprazole (PRILOSEC OTC) 20 MG tablet Take 1 tablet by mouth daily Yes Marissa Quezada MD   hydrocortisone 2.5 % ointment Apply topically 2 times daily. Yes Cranford Riedel, MD   triamcinolone (KENALOG) 0.1 % cream Apply topically 2 times daily, until rash gone Yes PEPE Werner CNP   melatonin 1 MG tablet Take 1 tablet by mouth nightly Yes PEPE Patterson CNP   Cholecalciferol (VITAMIN D3) 25 MCG (1000 UT) TABS TAKE 1 TABLET BY MOUTH DAILY Yes PEPE Patterson CNP   polyethylene glycol (GLYCOLAX) 17 GM/SCOOP powder Take as directed for bowel prep  Patient not taking: Reported on 7/12/2022  Marissa Quezada MD   bisacodyl (BISACODYL) 5 MG EC tablet Take as directed for bowel prep  Patient not taking: Reported on 7/12/2022  Marissa Quezada MD   pitavastatin (LIVALO) 1 MG TABS tablet Take 1 tablet by mouth nightly  Patient not taking: Reported on 7/12/2022  Cranford Riedel, MD   ibuprofen (ADVIL;MOTRIN) 800 MG tablet Take 1 tablet by mouth every 8 hours as needed for Pain  PEPE Chong CNP       ASSESSMENT/PLAN:  1. Chronic midline low back pain with left-sided sciatica  Failure to Improve. Continue current therapy. DISCUSSED AND ADVISED TO:  Do some back stretches as tolerated. Refer to hand out for instructions.   Call for worsening, numbness, weakness.    - naproxen (NAPROSYN) 500 MG tablet; Take 1 tablet by mouth 2 times daily (with meals)  Dispense: 180 tablet; Refill: 1  - tiZANidine (ZANAFLEX) 2 MG tablet; Take 1 tablet by mouth nightly as needed (stiffness)  Dispense: 30 tablet; Refill: 2  - XR LUMBAR SPINE (2-3 VIEWS); Future    2. Chronic fatigue  Failure to improve. Evaluate for metabolic disorders   and or vitamin deficiencies. - Vitamin B12 & Folate; Future    3. Mixed hyperlipidemia  Stable. Continue therapy. Advised to decrease the consumption of red meats, fried foods, trans fats, sweets, sugary beverages. Advised to increase fish, vegetables, and fruits consumption. Advised to add fiber or OTC supplements in diet. Discussed weight loss which will result in improvement of lipids levels. Advised to increase daily physical activities and add regular exercises. 4. Adjustment insomnia  Ongoing. Continue current routine or therapy. DISCUSSED AND ADVISED TO:  Cut down intake of drinks with caffeine, such as coffee or black tea, for 8 hours before bed. Cut down on smoking or use other types of tobacco near bedtime. Cut down on Nicotine and alcohol   Stop eating a big meal close to bedtime. Stop drinking a lot of  fluids close to bedtime. 5. Shift work sleep disorder  Ongoing. Continue current routine or therapy. DISCUSSED AND ADVISED TO:  Cut down intake of drinks with caffeine, such as coffee or black tea, for 8 hours before bed. Try to maintain routine/schedule. 6. Vitamin D deficiency  Stable. Continue Vitamin D supplementation  DISCUSSED AND ADVISED TO:  Foods that contain a lot of vitamin D includes [place], tuna, and mackerel. Cheese, egg yolks, and beef liver have small amounts of vit D.  Milk, soy drinks, orange juice, yogurt, margarine, and some kinds of cereal have vitamin D added to them. Continue to use sunblock when out in the sun to prevent skin cancer.     7. Chronic superficial gastritis without bleeding  Stable. Continue therapy. Discussed and advised to: Take medication with food. Monitor for signs of bleeding. 8. Obesity, Class I, BMI 30-34.9  Stable. BMI stable  DISCUSSED AND ADVISED TO:  Eat a low-fat and low carbohydrates diet. Avoid fried foods especially fast food. Choose healthier options for snacks. Have 5-6 servings of fruits and vegetables per day. Cut down on eating processed food. Add 30 minutes to 1 hour aerobic exercise for 3-4 days a week. This note was completed by using the assistance of a speech-recognition program. However, inadvertent computerized transcription errors may be present. Although every effort was made to ensure accuracy, no guarantees can be provided that every mistake has been identified and corrected by editing.   Electronically signed by PEPE Vang CNP on 6/17/21 at 9:57 PM EDT     --PEPE Vang CNP

## 2022-07-12 ENCOUNTER — OFFICE VISIT (OUTPATIENT)
Dept: FAMILY MEDICINE CLINIC | Age: 42
End: 2022-07-12
Payer: COMMERCIAL

## 2022-07-12 ENCOUNTER — HOSPITAL ENCOUNTER (OUTPATIENT)
Age: 42
Discharge: HOME | End: 2022-07-14
Payer: COMMERCIAL

## 2022-07-12 ENCOUNTER — HOSPITAL ENCOUNTER (OUTPATIENT)
Age: 42
Discharge: HOME | End: 2022-07-12
Payer: COMMERCIAL

## 2022-07-12 ENCOUNTER — HOSPITAL ENCOUNTER (OUTPATIENT)
Dept: GENERAL RADIOLOGY | Age: 42
Discharge: HOME | End: 2022-07-14
Payer: COMMERCIAL

## 2022-07-12 VITALS
BODY MASS INDEX: 30.12 KG/M2 | SYSTOLIC BLOOD PRESSURE: 122 MMHG | TEMPERATURE: 97.6 F | HEART RATE: 75 BPM | DIASTOLIC BLOOD PRESSURE: 76 MMHG | WEIGHT: 210.4 LBS | HEIGHT: 70 IN | OXYGEN SATURATION: 97 %

## 2022-07-12 DIAGNOSIS — F51.02 ADJUSTMENT INSOMNIA: ICD-10-CM

## 2022-07-12 DIAGNOSIS — R53.82 CHRONIC FATIGUE: ICD-10-CM

## 2022-07-12 DIAGNOSIS — E78.2 MIXED HYPERLIPIDEMIA: ICD-10-CM

## 2022-07-12 DIAGNOSIS — M54.42 CHRONIC MIDLINE LOW BACK PAIN WITH LEFT-SIDED SCIATICA: ICD-10-CM

## 2022-07-12 DIAGNOSIS — E55.9 VITAMIN D DEFICIENCY: ICD-10-CM

## 2022-07-12 DIAGNOSIS — E66.9 OBESITY, CLASS I, BMI 30-34.9: ICD-10-CM

## 2022-07-12 DIAGNOSIS — G47.26 SHIFT WORK SLEEP DISORDER: ICD-10-CM

## 2022-07-12 DIAGNOSIS — G89.29 CHRONIC MIDLINE LOW BACK PAIN WITH LEFT-SIDED SCIATICA: Primary | ICD-10-CM

## 2022-07-12 DIAGNOSIS — M54.42 CHRONIC MIDLINE LOW BACK PAIN WITH LEFT-SIDED SCIATICA: Primary | ICD-10-CM

## 2022-07-12 DIAGNOSIS — K29.30 CHRONIC SUPERFICIAL GASTRITIS WITHOUT BLEEDING: ICD-10-CM

## 2022-07-12 DIAGNOSIS — G89.29 CHRONIC MIDLINE LOW BACK PAIN WITH LEFT-SIDED SCIATICA: ICD-10-CM

## 2022-07-12 LAB
FOLATE: 15.5 NG/ML
VITAMIN B-12: 508 PG/ML (ref 232–1245)

## 2022-07-12 PROCEDURE — 36415 COLL VENOUS BLD VENIPUNCTURE: CPT

## 2022-07-12 PROCEDURE — 99214 OFFICE O/P EST MOD 30 MIN: CPT | Performed by: FAMILY MEDICINE

## 2022-07-12 PROCEDURE — 82746 ASSAY OF FOLIC ACID SERUM: CPT

## 2022-07-12 PROCEDURE — 82607 VITAMIN B-12: CPT

## 2022-07-12 PROCEDURE — 72100 X-RAY EXAM L-S SPINE 2/3 VWS: CPT

## 2022-07-12 RX ORDER — TIZANIDINE 2 MG/1
2 TABLET ORAL NIGHTLY PRN
Qty: 30 TABLET | Refills: 2 | Status: SHIPPED | OUTPATIENT
Start: 2022-07-12 | End: 2022-10-11

## 2022-07-12 RX ORDER — NAPROXEN 500 MG/1
500 TABLET ORAL 2 TIMES DAILY WITH MEALS
Qty: 180 TABLET | Refills: 1 | Status: SHIPPED | OUTPATIENT
Start: 2022-07-12

## 2022-07-12 SDOH — ECONOMIC STABILITY: FOOD INSECURITY: WITHIN THE PAST 12 MONTHS, THE FOOD YOU BOUGHT JUST DIDN'T LAST AND YOU DIDN'T HAVE MONEY TO GET MORE.: NEVER TRUE

## 2022-07-12 SDOH — ECONOMIC STABILITY: FOOD INSECURITY: WITHIN THE PAST 12 MONTHS, YOU WORRIED THAT YOUR FOOD WOULD RUN OUT BEFORE YOU GOT MONEY TO BUY MORE.: NEVER TRUE

## 2022-07-12 ASSESSMENT — SOCIAL DETERMINANTS OF HEALTH (SDOH): HOW HARD IS IT FOR YOU TO PAY FOR THE VERY BASICS LIKE FOOD, HOUSING, MEDICAL CARE, AND HEATING?: NOT VERY HARD

## 2022-07-12 NOTE — PROGRESS NOTES
Visit Information    Have you changed or started any medications since your last visit including any over-the-counter medicines, vitamins, or herbal medicines? no   Have you stopped taking any of your medications? Is so, why? -  no  Are you having any side effects from any of your medications? - no    Have you seen any other physician or provider since your last visit? yes -    Have you had any other diagnostic tests since your last visit?  no   Have you been seen in the emergency room and/or had an admission in a hospital since we last saw you?  yes -    Have you had your routine dental cleaning in the past 6 months?  yes -      Do you have an active MyChart account? If no, what is the barrier?   No:     Patient Care Team:  PPEE Patterson CNP as PCP - General (Family Medicine)  PEPE Montaño CNP as PCP - [facility] Empaneled Provider  Elio Ballesteros MD as Consulting Physician (Gastroenterology)    Medical History Review  Past Medical, Family, and Social History reviewed and does contribute to the patient presenting condition    Health Maintenance   Topic Date Due    COVID-19 Vaccine (1) Never done    Flu vaccine (1) 09/01/2022    Depression Screen  04/12/2023    Lipids  04/14/2023    DTaP/Tdap/Td vaccine (3 - Td or Tdap) 12/28/2030    Hepatitis C screen  Completed    HIV screen  Completed    Hepatitis A vaccine  Aged Out    Hepatitis B vaccine  Aged Out    Hib vaccine  Aged Out    Meningococcal (ACWY) vaccine  Aged Out    Pneumococcal 0-64 years Vaccine  Aged Saurabh

## 2022-07-12 NOTE — PATIENT INSTRUCTIONS
New Updates for [facility] My[facility]/ [facility] ([facility]) CRISTIAN    Thank you for choosing US to give you the best care! [email or web address] ([facility]) is always trying to think of new ways to help their patients. We are asking all patients to try out the new digital registration that is now available through your [facility] account or the new CRISTIAN, [facility] ([facility]). Via the cristian you're now able to update your personal and registration information prior to your upcoming appointment. This will save you time once you arrive at the office to check-in, not to mention your information remains safe!! Many other perks come from signing up for an account, such as:   Requesting refills   Scheduling an appointment   Completing an [address] Sending a message to the office/provider   Having access to your medication list   Paying your bill/copay prior to your appointment   Scheduling your yearly mammogram   Review your test results    If you are not familiar with [facility] or the [email or web address] CRISTIAN, please ask one of us and we will be happy to answer any questions or help you set-up your account. Your [facility] office,  [address] [facility]        Low Back Pain: Exercises  Introduction  Here are some examples of exercises for you to try. The exercises may be suggested for a condition or for rehabilitation. Start each exercise slowly. Ease off the exercises if you start to have pain. You will be told when to start these exercises and which ones will work bestfor you. How to do the exercises  Press-up    1. Lie on your stomach, supporting your body with your forearms. 2. Press your elbows down into the floor to raise your upper back. As you do this, relax your stomach muscles and allow your back to arch without using your back muscles. As your press up, do not let your hips or pelvis come off the floor. 3. Hold for 15 to 30 seconds, then relax. 4. Repeat 2 to 4 times.   Alternate arm and leg (bird dog) exercise    Do this exercise slowly. Try to keep your body straight at all times, and donot let one hip drop lower than the other. 1. Start on the floor, on your hands and knees. 2. Tighten your belly muscles. 3. Raise one leg off the floor, and hold it straight out behind you. Be careful not to let your hip drop down, because that will twist your trunk. 4. Hold for about 6 seconds, then lower your leg and switch to the other leg. 5. Repeat 8 to 12 times on each leg. 6. Over time, work up to holding for 10 to 30 seconds each time. 7. If you feel stable and secure with your leg raised, try raising the opposite arm straight out in front of you at the same time. Knee-to-chest exercise    1. Lie on your back with your knees bent and your feet flat on the floor. 2. Bring one knee to your chest, keeping the other foot flat on the floor (or keeping the other leg straight, whichever feels better on your lower back). 3. Keep your lower back pressed to the floor. Hold for at least 15 to 30 seconds. 4. Relax, and lower the knee to the starting position. 5. Repeat with the other leg. Repeat 2 to 4 times with each leg. 6. To get more stretch, put your other leg flat on the floor while pulling your knee to your chest.  Curl-ups    1. Lie on the floor on your back with your knees bent at a 90-degree angle. Your feet should be flat on the floor, about 12 inches from your buttocks. 2. Cross your arms over your chest. If this bothers your neck, try putting your hands behind your neck (not your head), with your elbows spread apart. 3. Slowly tighten your belly muscles and raise your shoulder blades off the floor. 4. Keep your head in line with your body, and do not press your chin to your chest.  5. Hold this position for 1 or 2 seconds, then slowly lower yourself back down to the floor. 6. Repeat 8 to 12 times. Pelvic tilt exercise    1. Lie on your back with your knees bent. 2. \"Brace\" your stomach.  This means to tighten your muscles by pulling in and imagining your belly button moving toward your spine. You should feel like your back is pressing to the floor and your hips and pelvis are rocking back. 3. Hold for about 6 seconds while you breathe smoothly. 4. Repeat 8 to 12 times. Heel dig bridging    1. Lie on your back with both knees bent and your ankles bent so that only your heels are digging into the floor. Your knees should be bent about 90 degrees. 2. Then push your heels into the floor, squeeze your buttocks, and lift your hips off the floor until your shoulders, hips, and knees are all in a straight line. 3. Hold for about 6 seconds as you continue to breathe normally, and then slowly lower your hips back down to the floor and rest for up to 10 seconds. 4. Do 8 to 12 repetitions. Hamstring stretch in doorway    1. Lie on your back in a doorway, with one leg through the open door. 2. Slide your leg up the wall to straighten your knee. You should feel a gentle stretch down the back of your leg. 3. Hold the stretch for at least 15 to 30 seconds. Do not arch your back, point your toes, or bend either knee. Keep one heel touching the floor and the other heel touching the wall. 4. Repeat with your other leg. 5. Do 2 to 4 times for each leg. Hip flexor stretch    1. Kneel on the floor with one knee bent and one leg behind you. Place your forward knee over your foot. Keep your other knee touching the floor. 2. Slowly push your hips forward until you feel a stretch in the upper thigh of your rear leg. 3. Hold the stretch for at least 15 to 30 seconds. Repeat with your other leg. 4. Do 2 to 4 times on each side. Wall sit    1. Stand with your back 10 to 12 inches away from a wall. 2. Lean into the wall until your back is flat against it. 3. Slowly slide down until your knees are slightly bent, pressing your lower back into the wall. 4. Hold for about 6 seconds, then slide back up the wall.   5. Repeat 8 to 12 times. Follow-up care is a key part of your treatment and safety. Be sure to make and go to all appointments, and call your doctor if you are having problems. It's also a good idea to know your test results and keep alist of the medicines you take. Where can you learn more? Go to [email or web address] and sign in to your [employer] account. Enter A818 in the [employer] box to learn more about \"Low Back Pain: Exercises. \"     If you do not have an account, please click on the \"Sign Up Now\" link. Current as of: March 9, 2022               Content Version: 13.3  © 2006-2022 Healthwise, Incorporated. Care instructions adapted under license by [facility] ([facility]). If you have questions about a medical condition or this instruction, always ask your healthcare professional. [address] any warranty or liability for your use of this information.

## 2022-07-13 DIAGNOSIS — M54.42 CHRONIC MIDLINE LOW BACK PAIN WITH LEFT-SIDED SCIATICA: Primary | ICD-10-CM

## 2022-07-13 DIAGNOSIS — E53.8 VITAMIN B 12 DEFICIENCY: Primary | ICD-10-CM

## 2022-07-13 DIAGNOSIS — M25.552 LEFT HIP PAIN: ICD-10-CM

## 2022-07-13 DIAGNOSIS — G89.29 CHRONIC MIDLINE LOW BACK PAIN WITH LEFT-SIDED SCIATICA: Primary | ICD-10-CM

## 2022-07-13 NOTE — RESULT ENCOUNTER NOTE
Degenerative arthritis of the lumbar spine. We can send a referral for PT. Mild facet arthritis which we spoke about on his visit causing shooting pains.

## 2022-07-18 ENCOUNTER — HOSPITAL ENCOUNTER (OUTPATIENT)
Dept: PHYSICAL THERAPY | Age: 42
Setting detail: THERAPIES SERIES
Discharge: HOME | End: 2022-07-18
Payer: COMMERCIAL

## 2022-07-18 PROCEDURE — 97161 PT EVAL LOW COMPLEX 20 MIN: CPT

## 2022-07-18 PROCEDURE — 97162 PT EVAL MOD COMPLEX 30 MIN: CPT

## 2022-07-18 PROCEDURE — 97110 THERAPEUTIC EXERCISES: CPT

## 2022-07-18 PROCEDURE — 97140 MANUAL THERAPY 1/> REGIONS: CPT

## 2022-07-18 NOTE — CONSULTS
[x] [facility]  [address]  [place]: [phone]   F: [phone]     Physical Therapy Lumbar Evaluation    Date:  2022  Patient: Amandeep Cortez  : 1980  MRN: [id number]  Physician: PEPE Persaud - CNP   Insurance: [employer] --  combined with OT/SLP. Hard Max   Medical Diagnosis:   M54.42, G89.29 (ICD-10-CM) - Chronic midline low back pain with left-sided sciatica   M25.552 (ICD-10-CM) - Left hip pain      Rehab Codes: R25 pain , M25.60 stiffness, R53.1 weakness   Onset Date: May 2022   Next 's appt: 22 - PCP     Precautions: needs Zambian interpretor      Subjective:   Pt arrives ambulatory with SO. He notes his main language is Bulgarian so connected to interpretive servcies    He notes he is here due to having pain in the L pelvic region in sitting and some low back discomfort when arising in the morning. The pelvic region pain is most debilitating as it causes his L leg to go numb with prolonged sitting. He works as a  that involves a lot of sitting. As the numbness increases then there could be shooting pains. He feels that his pain is best relieved with standing and walking. Does get sore in the low back if he walks too much. HPI:  Per referring physician note 22: \"BACK PAIN  Non traumatic. 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 Positive left leg radiation. No pain when laying down. He noticed some muscle aches in the left hip/thigh and gluteal region after walking a lot. He denies cervical, thoracic or midline lumbar back pain. The pain in anterior and lateral hip area, with radiation down the leg to the foot. He has tried tylenol with some relief. We will try naproxen and tizanidine. Xray ordered. Encouraged light stretching. \"    PMHx: [x] Unremarkable [] Diabetes [] HTN  [] Pacemaker   [] MI/Heart Problems [] Cancer [] Arthritis [] Other: Past Medical History:   Diagnosis Date    GERD (gastroesophageal reflux disease)     Irritable bowel syndrome          Past Surgical History:   Procedure Laterality Date    COLONOSCOPY      COLONOSCOPY N/A 5/10/2022    COLORECTAL CANCER SCREENING, NOT HIGH RISK performed by Georgie Mcardle, MD at [address] [facility], [place], DIAGNOSTIC      UPPER GASTROINTESTINAL ENDOSCOPY  08/10/2009    UPPER GASTROINTESTINAL ENDOSCOPY N/A 5/10/2022    EGD BIOPSY performed by Georgie Mcardle, MD at [address] [facility].        Comorbidities:   [] Obesity [] Dialysis  [] Other:   [] Asthma/COPD [] Dementia [] Other:   [] Stroke [] Sleep apnea [] Other:   [] Vascular disease [] Rheumatic disease [] Other:     Preferred Language:   [] English           [x] Other:Maltese     Prior Imaging: Xray July 2022  FINDINGS:   Lumbar spine alignment is anatomic. No acute osseous abnormality. Vertebral   body axial heights maintained. Mild degenerative change throughout most   significant L4-5 L5-S1 with loss disc height endplate spondylosis. Mild   facet arthropathy lower lumbar spine. There is calcification abdominal aorta. Previous Treatment:   -taking muscle relaxer and ibuprofen   - does more walking to relieve pain       Medications: [x] Refer to full medical record [] None [] Other:  Allergies:      [x] Refer to full medical record [] None [] Other:    Work Status: yes -- a  - does involve a lot sitting     Pain present?  yes   Location L hip -- more in the hip flexor, buttocks    Pain Rating currently 4-5/10   Pain at worse 10/10 -- with too much sitting    Pain at best 0/10    Description of pain Sometimes a sharp pain that radiates to the knee    Altered Sensation Only moderate to severe numbness with prolonged sitting    What makes it worse Sitting    What makes it better Walking    Symptom progression same   Sleep Yes -- more when on the back (now sleeping on stomach)          Functional Status Previous level of function Current level of function Comments   Sitting [x] Independent  [] Deficit [] Independent  [] Deficit Tolerance: 10 minutes    Standing/walking [x] Independent  [] Deficit [x] Independent  [] Deficit Gets relief of pain    Driving [x] Independent  [] Deficit [x] Independent  [x] Deficit Does get pain with sitting too long    Housekeeping/Meal Preparation [x] Independent  [] Deficit [x] Independent  [] Deficit    Lifting/Carrying [x] Independent  [] Deficit [x] Independent  [] Deficit    Bending/Reaching [x] Independent  [] Deficit [x] Independent  [] Deficit    Stair climbing [x] Independent  [] Deficit [x] Independent  [] Deficit    Pivoting [x] Independent  [] Deficit [x] Independent  [] Deficit    Squatting [x] Independent  [] Deficit [x] Independent  [] Deficit    Other [] Independent  [] Deficit [] Independent  [] Deficit      Patient Goals/Rehab Expectations: to relieve the pain to allow him to tolerate sitting       Objective:    OBSERVATION No Deficit Deficit Comments   Posture          Forward head [x]  []     Rounded shoulders [x]  []     Slumped sitting  [x]  []     kyphosis [x]  []     Lordosis [x]  []     Lateral Shift [x]  []     Scoliosis [x]  []     Iliac Crest [x]  []     PSIS [x]  []     ASIS []  [x]  Pain with palpation at ASIS    Genu Valgus [x]  []      Genu Varus [x]  []      Genu Recurvatum [x]  []      Pronation [x]  []      Supination [x]  []      Leg Length Discrp [x]  []      Palpation []  [x]  Tender throughout L quad & L groin/hip flexor    Joint Mobility  [x]  []     Sensation []  [x]   numbness in the LE with sitting for > 10 minutes    Edema [x]  []            Neurological [x]  []     Reflexes      Compression/distraction      Gait [x]  []      FALL RISK?  x      COMMENTS:          Range of Motion  Left Range of Motion  Right Strength  Left Strength  Right   Lumbar Flexion 80% -- increases pain in LLE       Lumbar Extension 100% no pain       Lumbar Rotation 100%  100% Lumbar Side Bend 100%  100% -- pain in L hip with side beding      Hip Flexion Full  full 4 5   Hip Abduction   4+ 5   Hip Adduction   5    Hip Extension Full -- tightness noted as mm pull Full  5 5   Hip ER   5 5   Hip IR   5 5   Knee Flexion   5 5   Knee Extension   5 5   Dorsiflexion   5 5   Plantar flexion   5 5       LUMBAR/SIJ SPECIAL TESTS Left Right   Standing Flexion + [x]         - []           NT []   Increases pain in L hip  + []         - []           NT []    Repeated Flexion + []         - []           NT [x]  + []         - []           NT []    Repeated Extension + []         - []           NT [x]  + []         - []           NT []    SLR + []         - [x]           NT []  + []         - [x]           NT []    Slump Test + []         - [x]           NT []  + []         - [x]           NT []    Prone Instability Test + []         - [x]           NT []  + []         - []           NT []    Anterior Gapping + []         - [x]           NT []  + []         - [x]           NT []    Posterior Gapping + []         - [x]           NT []  + []         - [x]           NT []    Sacral Thrust + []         - []           NT []  + []         - []           NT []    Thigh Thrust + [x]         - []           NT []   Some L hip pain  + []         - [x]           NT []    Gaenslen's + []         - []           NT []  + []         - []           NT []    Other:  + []         - []           NT []  + []         - []           NT []      HIP SPECIAL TESTS Left Right   JOSE RAMON + []         - [x]           NT []  + []         - [x]           NT []    FADIR + []         - [x]           NT []  + []         - [x]           NT []    Scour + []         - []           NT [x]  + []         - []           NT []    Trendelenburg Sign + []         - [x]           NT []  + []         - [x]           NT []    Dawson + []         - [x]           NT []  + []         - [x]           NT []    Long axis traction + []         - [x] NT []   No change to pain + []         - [x]           NT []    Other: + []         - []           NT []  + []         - []           NT []        NEURAL/VASCULAR TESTS Left Right   Sciatic Nerve Tensioning + []         - [x]           NT []  + []         - []           NT []    Tibial Nerve Tensioning + []         - [x]           NT []  + []         - []           NT []    Sural Nerve Tensioning + []         - [x]           NT []  + []         - []           NT []    Common Peroneal Nerve Tensioning + []         - [x]           NT []  + []         - []           NT []    Femoral Nerve Tensioning + [x]         - []           NT []  + []         - []           NT []    Other: + []         - []           NT []  + []         - []           NT []      MUSCLE LENGTH TESTING Normal Left Tight Right Tight   Glutes []  []  []    Piriformis []  []  []    ITB/TFL []  []  []    Hip Flexors []  [x]  []    Quads []  [x]  []    Hamstrings []  []  []    Gastrocnemius []  []  []    Soleus []  []  []     []  []  []     []  []  []        Assessment:  Pt presents with L hip pain and very mild low back pain. His L hip pain is most limiting as he has limited tolerance with sitting. During eval needed to frequently change positions as he has increased pain with sitting and lying fully supine. He is most tender throughout the L anterior hip and is very tender with palpation to the inguinal ligament. Feel that the ligament and tight hip flexors could be leading to some compression on the neural structures that pass by them causing that pain and numbness he gets with sitting. A potential differential could be that is it radicular from the low back but feel that is less likely. He has some mild deficits of flexibility and strength that could be contributing to this. Able to initiate a program of  stretching to  be completed as home program and manual work to try to relieve tightness. Will need to monitor to see if this improves symptoms.  If does not improve will further evaluate for the back as being a potential cause. Feel that this patient has the potential to progress and relieve pain with good compliance with home program. If no improvement will consider sending back to MD to further assess the hip. Feel this Patient would continue to benefit from skilled physical therapy services in order to: lessen pain in the L hip to increase tolerance to sitting. Problems:    [x] ? Pain:  [] ? ROM:  [x] ? Strength:  [x] ? Function:  [x] Other: difficulty with flexibility     STG/LTG: (to be met in 10 treatments)  Pt will self report worst pain no greater than 2/10 upon arrival to sessions in order to better tolerate ADLs/work activities with minimal dysfunction  Pt will report an improved sitting tolerance to >30 minutes before getting symptoms to allow him to be able to complete work duties. Pt will have improved tolerance to laying flat and on his side with no pain to allow him to have a good night's sleep. Pt will increase L hip strength to 5/5 in all major muscle groups to ensure equal strength to R side and to ensure appropriate hip/pelvic stability with mobility. **further goal to be established once appropriate PRO collected*   Pt will be independent with home program addressing strength, flexibility and balance to maximize gains made in therapy to improve overall functional capacity for mobility.     ** further goals to be established with additional assessments**       Functional Assessment Used: ariela fuller -- to be collected to next session       Evaluation Complexity:  History (Personal factors, comorbidities) [x] 0 [] 1-2 [] 3+   Exam (limitations, restrictions) [] 1-2 [x] 3 [] 4+   Clinical presentation (progression) [] Stable [x] Evolving  [] Unstable   Decision Making [x] Low [] Moderate [] High    [x] Low Complexity [] Moderate Complexity [] High Complexity     Rehab Potential:  [] Good  [] Fair  [] Poor   Suggested Professional Referral: since eval, strengthen the L hip, adjust for pain as needed     Treatment Charges: Mins Units   [x] Evaluation       [x]  Low       []  Moderate       []  High 30 1   []  Modalities     [x]  Ther Exercise 18 1   [x]  Manual Therapy 10 1   []  Ther Activities     []  Aquatics     []  Neuromuscular     []  Gait Training     []  Dry Needling           1-2 muscles     []  Dry Needling           3 or more muscles     [] Vasocompression     []  Other       Time in: 6:02p    Time Out: 7:00   TOTAL  TIME: 58     Total billable time: 29     Electronically signed by: Basilia Ivory PT

## 2022-07-20 ENCOUNTER — HOSPITAL ENCOUNTER (OUTPATIENT)
Dept: PHYSICAL THERAPY | Age: 42
Setting detail: THERAPIES SERIES
Discharge: HOME | End: 2022-07-20
Payer: COMMERCIAL

## 2022-07-20 PROCEDURE — 97140 MANUAL THERAPY 1/> REGIONS: CPT

## 2022-07-20 PROCEDURE — 97110 THERAPEUTIC EXERCISES: CPT

## 2022-07-20 NOTE — FLOWSHEET NOTE
[address] [facility] Out[facility] Physical Therapy   [address]   Phone: [phone]   Fax: [phone]    Physical Therapy Daily Treatment Note      Date:  2022  Patient Name:  Chantel Lou    :  1980  MRN: [id number]  Physician: PEPE Chou - CNP                           Insurance: Marcia Parkinson -- [id number] combined with OT/SLP. 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 Pain:  [x] Yes  [] No Location: Low back, L hip Pain Rating: (0-10 scale) 4/10  Pain altered Tx:  [x] No  [] Yes  Action:  Comments:    Objective:  INTERVENTIONS  INTERVENTIONS  Reps/ Time Weight/ Level Completed  Today Comments          MODALITIES                      MANUAL        HV to L quad and hip flexors & lateral hip 5'  x    STM & TPR to L hip flexor and psoas 5'  x                  EXERCISES        Hip Flexor stretch 3x30''  x    SLR 2x10  x    SLR with ER 10x  x    Bridges 10x  x    Hooklying clamshells 10x Red x    SL hip abduction 10x  x    SL clamshell 2x10 Red x    SL reverse clamshell 2x10  x    Marching 20x Red x    Prone hip extension 2x10  x           HOOS jr 5'  x                  Other:    Specific Instructions for next treatment collect HOOS jr (try to find British version), STM To L anterior hip and TPR to quad and psoas, monitor how he felt since eval, strengthen the L hip, adjust for pain as needed        Assessment: [x] Progressing toward goals.  Initiated session with manual interventions to decrease pain as pt stated his pain was increased from being in a seated position. Pt noted decreased pain with gentle STM from hypervolt and minimal pressure. Collected HOOS jr information with copy in paper chart. Patient noted increased anterior hip pain with SLR and ER, and SL clamshells. Production of pain noted in posterior hip with SL clamshells as well. Red theraband given for home exercises and and discussed stretching to relieve pain/stiffness. [] No change. [] Other:    [x] Patient would continue to benefit from skilled physical therapy services in order to: lessen pain in the L hip to increase tolerance to sitting. GOALS:   STG/LTG: (to be met in 10 treatments)  Pt will self report worst pain no greater than 2/10 upon arrival to sessions in order to better tolerate ADLs/work activities with minimal dysfunction  Pt will report an improved sitting tolerance to >30 minutes before getting symptoms to allow him to be able to complete work duties. Pt will have improved tolerance to laying flat and on his side with no pain to allow him to have a good night's sleep. Pt will increase L hip strength to 5/5 in all major muscle groups to ensure equal strength to R side and to ensure appropriate hip/pelvic stability with mobility. **further goal to be established once appropriate PRO collected*   Pt will be independent with home program addressing strength, flexibility and balance to maximize gains made in therapy to improve overall functional capacity for mobility. ** further goals to be established with additional assessments**      Pt. Education:  [x] Yes  [] No  [] Reviewed Prior HEP/Ed  Method of Education: [] Verbal  [] Demo  [] Written  Comprehension of Education:  [] Verbalizes understanding. [] Demonstrates understanding. [] Needs review. [] Demonstrates/verbalizes HEP/Ed previously given. Plan: [x] Continue per plan of care.    [] Other:      Treatment Charges: Mins Units   []  Modalities     [x]  Ther Exercise 37 2   [x]  Manual Therapy 10 1   []  Ther Activities     []  Aquatics     []  Neuromuscular     [] Vasocompression     [] Gait Training     [] Dry needling        [] 1 or 2 muscles        [] 3 or more muscles     []  Other     Total Treatment time 47 3     Time In: 6:15pm            Time Out: 7:00pm    Electronically signed by:  Taty Guzmán PTA

## 2022-07-25 ENCOUNTER — HOSPITAL ENCOUNTER (OUTPATIENT)
Dept: PHYSICAL THERAPY | Age: 42
Setting detail: THERAPIES SERIES
Discharge: HOME | End: 2022-07-25
Payer: COMMERCIAL

## 2022-07-25 PROCEDURE — 97110 THERAPEUTIC EXERCISES: CPT

## 2022-07-25 PROCEDURE — 97140 MANUAL THERAPY 1/> REGIONS: CPT

## 2022-07-25 NOTE — FLOWSHEET NOTE
[facility] Therapy   [address]   Phone: [phone]   Fax: [phone]    Physical Therapy Daily Treatment Note      Date:  2022  Patient Name:  Gabriella Campos    :  1980  MRN: [id number]  Physician: PEPE Sanchez - CNP                           Insurance: Wendy Voss --  combined with OT/SLP. Shawn Max   Medical Diagnosis:   M54.42, G89.29 (ICD-10-CM) - Chronic midline low back pain with left-sided sciatica   M25.552 (ICD-10-CM) - Left hip pain                 Rehab Codes: R25 pain , M25.60 stiffness, R53.1 weakness   Onset Date: May 2022                      Next 's appt: 22 - PCP     Visit# / total visits: 3/10  Cancels/No Shows: 0/0    Precautions:  needed. [phone]    Subjective:  Patient states his pain is starting to radiate down through his thigh, but he states it may be from strengthening that muscle. Notes he is stretching at home but not noticing much difference.        Pain:  [x] Yes  [] No Location: Low back, L hip Pain Rating: (0-10 scale) 4/10  Pain altered Tx:  [x] No  [] Yes  Action:  Comments:    Objective:  INTERVENTIONS  INTERVENTIONS  Reps/ Time Weight/ Level Completed  Today Comments          MODALITIES        MHP in prone position with exercises 10'  x           MANUAL        HV to L quad and hip flexors & lateral hip 5'      STM & TPR to L hip flexor and psoas 5'                    EXERCISES        Hip Flexor stretch 3x30''      SLR 2x10  x    SLR with ER 10x  x    Bridges 2x10  x    Hooklying clamshells 10x Red     SL hip abduction 2x15  x    SL clamshell 2x15 Red x    SL reverse clamshell 2x15  x    Marching 20x Red     Prone hip extension 2x15  x    Prone hip abduction 2x15  x    Prone press ups 15x5''  x           Standing:       QL hip hike 15x  x Increased difficulty- visual cues/demonstration needed   3 way hip 15x ea Red x Other:    Specific Instructions for next treatment collect JOSE fuller (try to find [place] version), STM To L anterior hip and TPR to quad and psoas, monitor how he felt since eval, strengthen the L hip, adjust for pain as needed        Assessment: [x] Progressing toward goals. Initiated session with MHP at anterior hip while completing prone exercises. Continued with documented exercise program to address symptoms. Added standing exercises with good tolerance demonstrated by patient. At end of session, pt stated he has numbness and tingling from the left side of his head and ear that shoots down his body to his toes, resulting in L foot numbness. Discussed this with the patient and stated the primary PT will be notified of this symptom. Patient stated this numbness and tingling only happens when seated or after lying in bed for greater than 3 hours. Educated patient on psoas muscle as this is what is originally suspected as the issue. Reviewed stretching with pt and will plan to give HEP at next visit. [] No change. [] Other:    [x] Patient would continue to benefit from skilled physical therapy services in order to: lessen pain in the L hip to increase tolerance to sitting. GOALS:   STG/LTG: (to be met in 10 treatments)  Pt will self report worst pain no greater than 2/10 upon arrival to sessions in order to better tolerate ADLs/work activities with minimal dysfunction  Pt will report an improved sitting tolerance to >30 minutes before getting symptoms to allow him to be able to complete work duties. Pt will have improved tolerance to laying flat and on his side with no pain to allow him to have a good night's sleep. Pt will increase L hip strength to 5/5 in all major muscle groups to ensure equal strength to R side and to ensure appropriate hip/pelvic stability with mobility.   **further goal to be established once appropriate PRO collected*   Pt will be independent with home program addressing strength, flexibility and balance to maximize gains made in therapy to improve overall functional capacity for mobility. ** further goals to be established with additional assessments**      Pt. Education:  [x] Yes  [] No  [] Reviewed Prior HEP/Ed  Method of Education: [] Verbal  [] Demo  [] Written  Comprehension of Education:  [] Verbalizes understanding. [] Demonstrates understanding. [] Needs review. [] Demonstrates/verbalizes HEP/Ed previously given. Plan: [x] Continue per plan of care.    [] Other:      Treatment Charges: Mins Units   []  Modalities     [x]  Ther Exercise 35 2   [x]  Manual Therapy 10 1   []  Ther Activities     []  Aquatics     []  Neuromuscular     [] Vasocompression     [] Gait Training     [] Dry needling        [] 1 or 2 muscles        [] 3 or more muscles     []  Other     Total Treatment time 45 3     Time In: 6:10pm            Time Out: 6:55pm    Electronically signed by:  Darrel Loera PTA

## 2022-07-27 ENCOUNTER — HOSPITAL ENCOUNTER (OUTPATIENT)
Dept: PHYSICAL THERAPY | Age: 42
Setting detail: THERAPIES SERIES
Discharge: HOME | End: 2022-07-27
Payer: COMMERCIAL

## 2022-07-27 PROCEDURE — 97110 THERAPEUTIC EXERCISES: CPT

## 2022-07-27 PROCEDURE — 97140 MANUAL THERAPY 1/> REGIONS: CPT

## 2022-07-27 NOTE — FLOWSHEET NOTE
[facility]   [address]   Phone: [phone]   Fax: [phone]    Physical Therapy Daily Treatment Note      Date:  2022  Patient Name:  Bony San    :  1980  MRN: [id number]  Physician: PEPE Coats - CNP                           [facility]: [facility] --  combined with OT/SLP. Hard Max   Medical Diagnosis:   M54.42, G89.29 (ICD-10-CM) - Chronic midline low back pain with left-sided sciatica   M25.552 (ICD-10-CM) - Left hip pain                 Rehab Codes: R25 pain , M25.60 stiffness, R53.1 weakness   Onset Date: May 2022                      Next 's appt: 22 - PCP     Visit# / total visits: 4/10  Cancels/No Shows: 0/0    Precautions:  needed. [phone]    Subjective:  Patient states he has no changes since his last visit. Pain and symptoms are unchanged.        Pain:  [x] Yes  [] No Location: Low back, L hip Pain Rating: (0-10 scale) 4/10  Pain altered Tx:  [x] No  [] Yes  Action:  Comments:    Objective:  INTERVENTIONS  INTERVENTIONS  Reps/ Time Weight/ Level Completed  Today Comments          MODALITIES        MHP in prone position with exercises 10'  x           MANUAL        HV to L quad and hip flexors & lateral hip 5'      STM & TPR to L hip flexor and psoas 5'                    EXERCISES        Hip Flexor stretch 3x30''      SLR 2x15  x    SLR with ER 15x  x    Bridges 2x10 Red x    Hooklying clamshells 10x Red x    SL hip abduction 2x15 Red x    SL clamshell 2x15 Red x    SL reverse clamshell 2x15      Marching 20x Red     Prone hip extension 2x15  x    Prone hip abduction 2x15  x    Prone press ups 15x5''  x To stretch hip flexor          Standing:       QL hip hike 15x  x Increased difficulty- visual cues/demonstration needed   3 way hip 15x ea Red     Lunge hip flexor stretch 15x  x                                Other:    Specific Instructions for next treatment STM To L anterior hip and TPR to quad and psoas, monitor how he felt since eval, strengthen the L hip, adjust for pain as needed      HEP:  Access Code: [email or web address]  URL: [email or web address]  Date: 07/27/2022  Prepared by: Phani Peguero    Exercises  Hip Hiking on Step - 1 x daily - 7 x weekly - 3 sets - 10 reps  Standing Hip Flexion with Resistance Loop - 1 x daily - 7 x weekly - 3 sets - 10 reps  Hip Extension with Resistance Loop - 1 x daily - 7 x weekly - 3 sets - 10 reps  Standing Hip Abduction Kicks - 1 x daily - 7 x weekly - 3 sets - 10 reps  Modified Sebastián Stretch - 1 x daily - 7 x weekly - 3 sets - 1 min hold        Assessment: [x] Progressing toward goals. Continued with documented stretches and exercises in order to globally strengthen L hip and reduce tension with L hip flexor. Patient noted improved symptoms after ending session with manual interventions rather than starting with them. Also printed HEP in [place]ian for pt to complete at home, red theraband given. [] No change. [] Other:    [x] Patient would continue to benefit from skilled physical therapy services in order to: lessen pain in the L hip to increase tolerance to sitting. GOALS:   STG/LTG: (to be met in 10 treatments)  Pt will self report worst pain no greater than 2/10 upon arrival to sessions in order to better tolerate ADLs/work activities with minimal dysfunction  Pt will report an improved sitting tolerance to >30 minutes before getting symptoms to allow him to be able to complete work duties. Pt will have improved tolerance to laying flat and on his side with no pain to allow him to have a good night's sleep. Pt will increase L hip strength to 5/5 in all major muscle groups to ensure equal strength to R side and to ensure appropriate hip/pelvic stability with mobility.   **further goal to be established once appropriate PRO collected*   Pt will be independent with home program addressing strength, flexibility and balance to maximize gains made in therapy to improve overall functional capacity for mobility. ** further goals to be established with additional assessments**      Pt. Education:  [x] Yes  [] No  [] Reviewed Prior HEP/Ed  Method of Education: [] Verbal  [] Demo  [] Written  Comprehension of Education:  [] Verbalizes understanding. [] Demonstrates understanding. [] Needs review. [] Demonstrates/verbalizes HEP/Ed previously given. Plan: [x] Continue per plan of care.    [] Other:      Treatment Charges: Mins Units   []  Modalities     [x]  Ther Exercise 35 2   [x]  Manual Therapy 10 1   []  Ther Activities     []  Aquatics     []  Neuromuscular     [] Vasocompression     [] Gait Training     [] Dry needling        [] 1 or 2 muscles        [] 3 or more muscles     []  Other     Total Treatment time 45 3     Time In: 6:12pm            Time Out: 6:58 pm    Electronically signed by:  Preethi Onofre PTA

## 2022-08-01 ENCOUNTER — HOSPITAL ENCOUNTER (OUTPATIENT)
Dept: PHYSICAL THERAPY | Age: 42
Setting detail: THERAPIES SERIES
Discharge: HOME | End: 2022-08-01
Payer: COMMERCIAL

## 2022-08-01 PROCEDURE — 97140 MANUAL THERAPY 1/> REGIONS: CPT

## 2022-08-01 PROCEDURE — 97110 THERAPEUTIC EXERCISES: CPT

## 2022-08-01 NOTE — FLOWSHEET NOTE
[facility] Outpatient Physical Therapy   [facility]   Phone: [phone]   Fax: [phone]    Physical Therapy Daily Treatment Note      Date:  2022  Patient Name:  Marilynn Tony    :  1980  MRN: [id number]  Physician: PEPE Camp - CNP                           Insurance: [employer] [facility] --  combined with OT/SLP. 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 Notes it is a shooting pain. Worsens with bending over -- especially with putting on his shoes. . Had is previously in  and been worse this time. He is taking pain medication as needed. Even with laying down now his L hip is bothering him. Pain:  [x] Yes  [] No Location: Low back Pain Rating: (0-10 scale) 4-5/10 -- using pain medication to keep it under control.    Pain altered Tx:  [x] No  [] Yes  Action:  Comments:     Objective:  INTERVENTIONS  INTERVENTIONS  Reps/ Time Weight/ Level Completed  Today Comments          MODALITIES        MHP to L ant hip in prone position with exercises 10'  x           MANUAL        HV to L quad and hip flexors & lateral hip 5'      STM & TPR to L hip flexor and psoas 5'      STM to lumbar paraspinals 8'  x    PA mobs to lumbar spine  4'  x           EXERCISES        Hip Flexor stretch 3x30''      SLR 2x15      SLR with ER 15x      Bridges 2x10 Red     Hooklying clamshells 10x Red     SL hip abduction 2x15 Red     SL clamshell 2x15 Red     SL reverse clamshell 2x15      Marching 20x Red     Prone hip extension 3x10 0 -2.5# x Increased weight after 1st set for more challenge    Prone hip abduction 2x15 2.5# x    Prone press ups 2x10   x To stretch hip flexor          Standing:       QL hip hike 15x   Increased difficulty- visual cues/demonstration needed   3 way hip 10x ea 2.5# x Only gets increased pain with L hip flexion    Lunge hip flexor stretch 15x      Straight arm pull downs  2x20  green x Initially bilateral an progressed to unilateral for greater core challenge    Palloff press  2x15 ea green x           Pain post session    5/10    Other:    Specific Instructions for next treatment STM To L anterior hip and TPR to quad and psoas, monitor how he felt since eval, strengthen the L hip, adjust for pain as needed      HEP:  Access Code: [email or web address]  URL: [email or web address]  Date: 07/27/2022  Prepared by: Aquiles Choi    Exercises  Hip Hiking on Step - 1 x daily - 7 x weekly - 3 sets - 10 reps  Standing Hip Flexion with Resistance Loop - 1 x daily - 7 x weekly - 3 sets - 10 reps  Hip Extension with Resistance Loop - 1 x daily - 7 x weekly - 3 sets - 10 reps  Standing Hip Abduction Kicks - 1 x daily - 7 x weekly - 3 sets - 10 reps  Modified Sebastián Stretch - 1 x daily - 7 x weekly - 3 sets - 1 min hold        Assessment: [x] Progressing toward goals. Focused more on the lumbar spine for treatment as pain is highest in this area and he is having less complaints of pain in the anterior hip. He notes most pain with flexion based activities so completed more of extension based movements to see if this helps pain. Encouraged to do extension based movement a couple times a day for home program and to avoid excessive flexion. Added in more stability exercises for the lumbar spine with good tolerance. Pain only increases with L hip SLR on 3 way hip. Kept in smaller range with better tolerance. Pt notes he feels better but would still rate pain at 5/10 post session.  Fee that focusing more on the lumbar spine will be beneficial as the L anterior hip pain could be radicular from the lumbar spine. [] No change. [] Other:    [x] Patient would continue to benefit from skilled physical therapy services in order to: lessen pain in the L hip to increase tolerance to sitting. GOALS:   STG/LTG: (to be met in 10 treatments)  Pt will self report worst pain no greater than 2/10 upon arrival to sessions in order to better tolerate ADLs/work activities with minimal dysfunction  Pt will report an improved sitting tolerance to >30 minutes before getting symptoms to allow him to be able to complete work duties. Pt will have improved tolerance to laying flat and on his side with no pain to allow him to have a good night's sleep. Pt will increase L hip strength to 5/5 in all major muscle groups to ensure equal strength to R side and to ensure appropriate hip/pelvic stability with mobility. **further goal to be established once appropriate PRO collected*   Pt will be independent with home program addressing strength, flexibility and balance to maximize gains made in therapy to improve overall functional capacity for mobility. ** further goals to be established with additional assessments**      Pt. Education:  [x] Yes  [] No  [] Reviewed Prior HEP/Ed  Method of Education: [] Verbal  [] Demo  [] Written  Comprehension of Education:  [] Verbalizes understanding. [] Demonstrates understanding. [] Needs review. [] Demonstrates/verbalizes HEP/Ed previously given. Plan: [x] Continue per plan of care.    [] Other:      Treatment Charges: Mins Units   []  Modalities     [x]  Ther Exercise 38 2   [x]  Manual Therapy 10 1   []  Ther Activities     []  Aquatics     []  Neuromuscular     [] Vasocompression     [] Gait Training     [] Dry needling        [] 1 or 2 muscles        [] 3 or more muscles     []  Other     Total Treatment time 48 3     Time In: 6:08pm            Time Out: 7:56 pm    Electronically signed by:  Nasir Daniel [facility]

## 2022-08-03 ENCOUNTER — HOSPITAL ENCOUNTER (OUTPATIENT)
Dept: PHYSICAL THERAPY | Age: 42
Setting detail: THERAPIES SERIES
Discharge: HOME | End: 2022-08-03
Payer: COMMERCIAL

## 2022-08-03 PROCEDURE — 97110 THERAPEUTIC EXERCISES: CPT

## 2022-08-03 PROCEDURE — 97140 MANUAL THERAPY 1/> REGIONS: CPT

## 2022-08-03 NOTE — FLOWSHEET NOTE
[facility] Out[facility] Therapy   [address]   Phone: [phone]   Fax: [phone]    Physical Therapy Daily Treatment Note      Date:  8/3/2022  Patient Name:  Bony San    :  1980  MRN: [id number]  Physician: PEPE Coats - CNP                           [facility] 60/60 combined with OT/SLP. Hard Max   Medical Diagnosis:   M54.42, G89.29 (ICD-10-CM) - Chronic midline low back pain with left-sided sciatica   M25.552 (ICD-10-CM) - Left hip pain                 Rehab Codes: R25 pain , M25.60 stiffness, R53.1 weakness   Onset Date: May 2022                      Next 's appt: 22 - PCP     Visit# / total visits: 6/10  Cancels/No Shows: 0/0    Precautions:  needed -- [facility]  #[id number]    Subjective:  Patient reporting to therapy with moderate pain. Most pain in AM in back and leg. Patient stating he has to lay down to relieve        Pain:  [x] Yes  [] No Location: Low back Pain Rating: (0-10 scale) 3/10 -- using pain medication to keep it under control.    Pain altered Tx:  [x] No  [] Yes  Action:  Comments:     Objective:  INTERVENTIONS  INTERVENTIONS  Reps/ Time Weight/ Level Completed  Today Comments          MODALITIES        MHP to L ant hip in prone position with exercises 10'  x           MANUAL  10'      HV to L quad and hip flexors & lateral hip 5'      STM & TPR to L hip flexor and psoas 5'      STM to lumbar paraspinals 5'  x    PA mobs to lumbar spine  5'  x           EXERCISES        Hip Flexor stretch 3x30''      Piriformis stretch 3x30\"   x    Active Hamstring stretch 10x2  x LLE only   LTR 10x 3\"  x    SLR 2x15      SLR with ER 15x      Bridges 2x10 Red     Hooklying clamshells 10x Red     SL hip abduction 2x15 Red     SL clamshell 2x15 Red     SL reverse clamshell 2x15      Marching 20x Red     Prone hip extension 3x10 2.5# x Increased weight after 1st set for more challenge    Prone hip abduction 2x15 2.5# x    Prone press ups 2x10   x To stretch hip flexor   Prone hip flexor stretch 3x 30\" Strap and towel under knee x    Standing:       QL hip hike 15x   Increased difficulty- visual cues/demonstration needed   3 way hip 10x ea 2.5# x Only gets increased pain with L hip flexion    Lunge hip flexor stretch 15x      Straight arm pull downs  2x20  green x Initially bilateral an progressed to unilateral for greater core challenge; 8/3 performed B and 15x    Palloff press  2x15 ea green x 15x only this session 8/3          Pain post session    Not rated 8/3    Other:    Specific Instructions for next treatment STM To L anterior hip and TPR to quad and psoas, monitor how he felt since eval, strengthen the L hip, adjust for pain as needed      HEP:  Access Code: [email or web address]  URL: [email or web address]  Date: 07/27/2022  Prepared by: Cruzito Nguyen    Exercises  Hip Hiking on Step - 1 x daily - 7 x weekly - 3 sets - 10 reps  Standing Hip Flexion with Resistance Loop - 1 x daily - 7 x weekly - 3 sets - 10 reps  Hip Extension with Resistance Loop - 1 x daily - 7 x weekly - 3 sets - 10 reps  Standing Hip Abduction Kicks - 1 x daily - 7 x weekly - 3 sets - 10 reps  Modified Sebastián Stretch - 1 x daily - 7 x weekly - 3 sets - 1 min hold        Assessment: [x] Progressing toward goals. Continued with core and hip strengthening as patient could tolerate with patient reporting a slight increase in pain at the end of session. Cues as needed to correct technique and posture. Added piriformis stretch to decrease glut and low back tightness and active hamstring stretch as nerve glide to decrease tightness to improve mobility and decrease symptoms of numbness in LLE.      [] No change. [] Other:    [x] Patient would continue to benefit from skilled physical therapy services in order to: lessen pain in the L hip to increase tolerance to sitting.     GOALS:   STG/LTG: (to be met in 10 treatments)  Pt will self report worst pain no greater than 2/10 upon arrival to sessions in order to better tolerate ADLs/work activities with minimal dysfunction  Pt will report an improved sitting tolerance to >30 minutes before getting symptoms to allow him to be able to complete work duties. Pt will have improved tolerance to laying flat and on his side with no pain to allow him to have a good night's sleep. Pt will increase L hip strength to 5/5 in all major muscle groups to ensure equal strength to R side and to ensure appropriate hip/pelvic stability with mobility. **further goal to be established once appropriate PRO collected*   Pt will be independent with home program addressing strength, flexibility and balance to maximize gains made in therapy to improve overall functional capacity for mobility. ** further goals to be established with additional assessments**      Pt. Education:  [x] Yes  [] No  [] Reviewed Prior HEP/Ed  Method of Education: [] Verbal  [] Demo  [] Written  Comprehension of Education:  [] Verbalizes understanding. [] Demonstrates understanding. [] Needs review. [] Demonstrates/verbalizes HEP/Ed previously given. Plan: [x] Continue per plan of care.    [] Other:      Treatment Charges: Mins Units   []  Modalities     [x]  Ther Exercise 35 2   [x]  Manual Therapy 10 1   []  Ther Activities     []  Aquatics     []  Neuromuscular     [] Vasocompression     [] Gait Training     [] Dry needling        [] 1 or 2 muscles        [] 3 or more muscles     []  Other     Total Treatment time 45 3     Time In: 0711      Time Out: 1900    Electronically signed by:  Kp Mixon PTA

## 2022-08-08 ENCOUNTER — HOSPITAL ENCOUNTER (OUTPATIENT)
Dept: PHYSICAL THERAPY | Age: 42
Setting detail: THERAPIES SERIES
Discharge: HOME | End: 2022-08-08
Payer: COMMERCIAL

## 2022-08-08 PROCEDURE — 97140 MANUAL THERAPY 1/> REGIONS: CPT

## 2022-08-08 PROCEDURE — 97110 THERAPEUTIC EXERCISES: CPT

## 2022-08-08 NOTE — FLOWSHEET NOTE
2x15      Marching 20x Red     Prone hip extension 3x10 2.5# x    Prone hip abduction 2x15 2.5# x    Prone press ups 2x10   x To stretch hip flexor   Prone hip flexor stretch 3x 30\" Strap and towel under knee x    Standing:       QL hip hike 15x   Increased difficulty- visual cues/demonstration needed   3 way hip 10x2 ea 2.5# x     Hip hinges  15x  x Holding obi on back to keep aligned. --Increases pain    Straight arm pull downs  2x20  green  Initially bilateral an progressed to unilateral for greater core challenge; 8/3 performed B and 15x    Palloff press  2x15 ea green  15x only this session 8/3          Pain post session    3/10    Other:  8/8-- provided with a new HEP    Specific Instructions for next treatment:    HEP:  Access Code: [email or web address]  URL: [email or web address]  Date: 07/27/2022  Prepared by: Aquiles Choi    Exercises  Hip Hiking on Step - 1 x daily - 7 x weekly - 3 sets - 10 reps  Standing Hip Flexion with Resistance Loop - 1 x daily - 7 x weekly - 3 sets - 10 reps  Hip Extension with Resistance Loop - 1 x daily - 7 x weekly - 3 sets - 10 reps  Standing Hip Abduction Kicks - 1 x daily - 7 x weekly - 3 sets - 10 reps  Modified Sebastián Stretch - 1 x daily - 7 x weekly - 3 sets - 1 min hold        Assessment: [x] Progressing toward goals. Pt arrives with less pain in the LLE that is more in the low back. This could be centralization which is positive. Continued with program of manual soft tissue work and extension based exercises as pt has been getting some relief with these. Able to get some relief prior to mild flare up after hip hinges back to pre-session pain levels. By end of session pt notes a soreness in the L lumbar spine region lateral to the paraspinals. Felt this could be some soreness from interventions so will continue to monitor. Provided pt with new HEP of stretches and mobility work to see if he can get further relief of pain. [] No change.      [] Other:    [x] Patient would continue to benefit from skilled physical therapy services in order to: lessen pain in the L hip to increase tolerance to sitting. GOALS:   STG/LTG: (to be met in 10 treatments)  Pt will self report worst pain no greater than 2/10 upon arrival to sessions in order to better tolerate ADLs/work activities with minimal dysfunction  Pt will report an improved sitting tolerance to >30 minutes before getting symptoms to allow him to be able to complete work duties. Pt will have improved tolerance to laying flat and on his side with no pain to allow him to have a good night's sleep. Pt will increase L hip strength to 5/5 in all major muscle groups to ensure equal strength to R side and to ensure appropriate hip/pelvic stability with mobility. **further goal to be established once appropriate PRO collected*   Pt will be independent with home program addressing strength, flexibility and balance to maximize gains made in therapy to improve overall functional capacity for mobility. ** further goals to be established with additional assessments**      Pt. Education:  [x] Yes  [] No  [] Reviewed Prior HEP/Ed  Method of Education: [] Verbal  [] Demo  [] Written  Comprehension of Education:  [] Verbalizes understanding. [] Demonstrates understanding. [] Needs review. [] Demonstrates/verbalizes HEP/Ed previously given. Plan: [x] Continue per plan of care.    [] Other:      Treatment Charges: Mins Units   []  Modalities     [x]  Ther Exercise 35 2   [x]  Manual Therapy 10 1   []  Ther Activities     []  Aquatics     []  Neuromuscular     [] Vasocompression     [] Gait Training     [] Dry needling        [] 1 or 2 muscles        [] 3 or more muscles     []  Other     Total Treatment time 45 3     Time In: 5:35p    Time Out: 6:20p    Electronically signed by:  Odell Jimenez, PT

## 2022-08-09 ENCOUNTER — OFFICE VISIT (OUTPATIENT)
Dept: GASTROENTEROLOGY | Age: 42
End: 2022-08-09
Payer: COMMERCIAL

## 2022-08-09 VITALS
HEIGHT: 70 IN | WEIGHT: 210.4 LBS | BODY MASS INDEX: 30.12 KG/M2 | HEART RATE: 70 BPM | DIASTOLIC BLOOD PRESSURE: 82 MMHG | SYSTOLIC BLOOD PRESSURE: 118 MMHG

## 2022-08-09 DIAGNOSIS — K59.00 CONSTIPATION, UNSPECIFIED CONSTIPATION TYPE: Primary | ICD-10-CM

## 2022-08-09 DIAGNOSIS — Z98.890 HISTORY OF COLONOSCOPY: ICD-10-CM

## 2022-08-09 PROCEDURE — 99213 OFFICE O/P EST LOW 20 MIN: CPT | Performed by: INTERNAL MEDICINE

## 2022-08-09 RX ORDER — DOCUSATE SODIUM 100 MG/1
100 CAPSULE, LIQUID FILLED ORAL DAILY PRN
Qty: 30 CAPSULE | Refills: 2 | Status: SHIPPED | OUTPATIENT
Start: 2022-08-09 | End: 2022-09-08

## 2022-08-09 ASSESSMENT — ENCOUNTER SYMPTOMS
COLOR CHANGE: 0
VOICE CHANGE: 0
SORE THROAT: 0
ABDOMINAL DISTENTION: 0
ABDOMINAL PAIN: 0
CONSTIPATION: 1
WHEEZING: 0
APNEA: 0
RECTAL PAIN: 0
VOMITING: 0
TROUBLE SWALLOWING: 0
ANAL BLEEDING: 0
CHOKING: 0
BLOOD IN STOOL: 0
NAUSEA: 0
DIARRHEA: 0
COUGH: 0
SHORTNESS OF BREATH: 0

## 2022-08-09 NOTE — PROGRESS NOTES
GI FOLLOW UP    INTERVAL HISTORY:       Follow-up after upper endoscopy and colonoscopy  Clinically doing well    Chief Complaint   Patient presents with    Follow-up     colonoscopy       1. Constipation, unspecified constipation type    2. History of colonoscopy          HISTORY OF PRESENT ILLNESS: Mr.Hector Surjit Navarro is a 42 y.o. male with a past history remarkable for family history of colorectal cancer, father was diagnosed with colorectal cancer several years ago, previous colonoscopy performed in 2009 which failed to reveal any evidence of precancerous lesions or polyps, referred for evaluation of surveillance colonoscopy. Patient denies any prior left rectum, hematochezia, no melena. Denies any changes in appetite or weight changes. Non-smoker nondrinker. Smoker: None   Drinking history: Social  Illicit drugs: None   Abdominal surgeries: None   Prior Colonoscopy: 2009-- No polyps. Jazmin--   Prior EGD: 2009--   FH of GI issues: Father with CRC. Past Medical,Family, and Social History reviewed and does contribute to the patient presenting condition. Patient's PMH/PSH,SH,PSYCH Hx, MEDs, ALLERGIES, and ROS were all reviewed and updated in the appropriate sections.     PAST MEDICAL HISTORY:  Past Medical History:   Diagnosis Date    GERD (gastroesophageal reflux disease)     Irritable bowel syndrome        Past Surgical History:   Procedure Laterality Date    COLONOSCOPY      COLONOSCOPY N/A 5/10/2022    COLORECTAL CANCER SCREENING, NOT HIGH RISK performed by Imani Sen MD at [facility], DIAGNOSTIC      UPPER GASTROINTESTINAL ENDOSCOPY  08/10/2009    UPPER GASTROINTESTINAL ENDOSCOPY N/A 5/10/2022    EGD BIOPSY performed by Imani Sen MD at [address] [place] [phone]:    Current Outpatient Medications:     docusate sodium (Terri Holguin) documentation. /82   Pulse 70   Ht 5' 10\" (1.778 m)   Wt 210 lb 6.4 oz (95.4 kg)   BMI 30.19 kg/m²   Body mass index is 30.19 kg/m². Physical Exam    Physical Exam   Constitutional: Patient is oriented to person, place, and time. Patient appears well-developed and well-nourished. HENT:   Head: Normocephalic and atraumatic. Eyes: Pupils are equal, round, and reactive to light. EOM are normal.   Neck: Normal range of motion. Neck supple. No JVD present. No tracheal deviation present. No thyromegaly present. Cardiovascular: Normal rate, regular rhythm, normal heart sounds and intact distal pulses. Pulmonary/Chest: Effort normal and breath sounds normal. No stridor. No respiratory distress. He has no wheezes. He has no rales. He exhibits no tenderness. Abdominal: Soft. Bowel sounds are normal. He exhibits no distension and no mass. There is no tenderness. There is no rebound and no guarding. No hernia. Musculoskeletal: Normal range of motion. Lymphadenopathy:    Patient has no cervical adenopathy. Neurological: Patient is alert and oriented to person, place, and time. Psychiatric: Patient has a normal mood and affect.  Patient behavior is normal.       LABORATORY DATA: Reviewed  Lab Results   Component Value Date    WBC 6.6 04/14/2022    HGB 15.5 04/14/2022    HCT 45.4 04/14/2022    MCV 88.5 04/14/2022     04/14/2022     04/14/2022    K 4.7 04/14/2022     04/14/2022    CO2 27 04/14/2022    BUN 15 04/14/2022    CREATININE 0.77 04/14/2022    LABALBU 4.3 04/14/2022    BILITOT 0.56 04/14/2022    ALKPHOS 77 04/14/2022    AST 26 04/14/2022    ALT 35 04/14/2022         Lab Results   Component Value Date    RBC 5.13 04/14/2022    HGB 15.5 04/14/2022    MCV 88.5 04/14/2022    MCH 30.3 04/14/2022    MCHC 34.2 04/14/2022    RDW 13.2 04/14/2022    MPV 7.8 04/14/2022    BASOPCT 1 02/09/2021    LYMPHSABS 2.10 02/09/2021    MONOSABS 0.60 02/09/2021    NEUTROABS 4.00 02/09/2021 EOSABS 0.20 02/09/2021    BASOSABS 0.10 02/09/2021         DIAGNOSTIC TESTING:     XR LUMBAR SPINE (2-3 VIEWS)    Result Date: 7/13/2022  EXAMINATION: THREE XRAY VIEWS OF THE LUMBAR SPINE 7/12/2022 4:57 pm COMPARISON: None. HISTORY: ORDERING SYSTEM PROVIDED HISTORY: Chronic midline low back pain with left-sided sciatica TECHNOLOGIST PROVIDED HISTORY: lumbar pain Reason for Exam: Chronic lower back pain leading into the left hip. Left-sided sciatica. FINDINGS: Lumbar spine alignment is anatomic. No acute osseous abnormality. Vertebral body axial heights maintained. Mild degenerative change throughout most significant L4-5 L5-S1 with loss disc height endplate spondylosis. Mild facet arthropathy lower lumbar spine. There is calcification abdominal aorta. No acute findings. Multilevel degenerative change. IMPRESSION: Mr.Hector Josefina Plaza is a 42 y.o. male with a past history remarkable for family history of colorectal cancer, father was diagnosed with colorectal cancer several years ago, previous colonoscopy performed in 2009 which failed to reveal any evidence of precancerous lesions or polyps, referred for evaluation of surveillance colonoscopy. Patient denies any prior left rectum, hematochezia, no melena. Denies any changes in appetite or weight changes. Non-smoker nondrinker. Assessment  1. Constipation, unspecified constipation type    2. History of colonoscopy        Marito Trent was seen today for follow-up. Diagnoses and all orders for this visit:    Constipation, unspecified constipation type-patient continue with Colace 100 mg as needed. Increase oral hydration with minimum 64 ounce of water per day. Dietary modifications recommended with education provided during this visit. History of colonoscopy-repeat colonoscopy in 5 years due to family history, first-degree relative with colorectal cancer. No obvious polyps identified or concerning lesions.     Other orders  -     docusate sodium (COLACE) 100 MG capsule; Take 1 capsule by mouth daily as needed for Constipation         RTC:    Additional comments: Thank you for allowing me to participate in the care of Mr. Aida Solis. For any further questions please do not hesitate to contact me. I have reviewed and agree with the ROS entered by the MA/LPN from today's encounter documented in a separate note. Kell Cruz MD, MPH   Board Certified in Gastroenterology  Board Certified in [address] [place] [address] #: [phone]    this note is created with the assistance of a speech recognition program.  While intending to generate a document that actually reflects the content of the visit, the document can still have some errors including those of syntax and sound a like substitutions which may escape proof reading. It such instances, actual meaning can be extrapolated by contextual diversion.

## 2022-08-10 ENCOUNTER — HOSPITAL ENCOUNTER (OUTPATIENT)
Dept: PHYSICAL THERAPY | Age: 42
Setting detail: THERAPIES SERIES
Discharge: HOME | End: 2022-08-10
Payer: COMMERCIAL

## 2022-08-10 PROCEDURE — 97140 MANUAL THERAPY 1/> REGIONS: CPT

## 2022-08-10 PROCEDURE — 97110 THERAPEUTIC EXERCISES: CPT

## 2022-08-10 NOTE — FLOWSHEET NOTE
[facility]   [address]   Phone: [phone]   Fax: [phone]    Physical Therapy Daily Treatment Note      Date:  8/10/2022  Patient Name:  Larry Kerns    :  1980  MRN: [id number]  Physician: PEPE Ward - FRANCIS                           Insurance: [employer] -- 60 combined with OT/SLP. Hard Max   Medical Diagnosis:   M54.42, G89.29 (ICD-10-CM) - Chronic midline low back pain with left-sided sciatica   M25.552 (ICD-10-CM) - Left hip pain                 Rehab Codes: R25 pain , M25.60 stiffness, R53.1 weakness   Onset Date: May 2022                      Next 's appt: 22 - PCP     Visit# / total visits: 8/10  Cancels/No Shows: 0/0    Precautions:  needed -- Nancy Arechiga #[id number]    Subjective:  Patient reports he was doing the exercises at home and was noticing a pain in the LLE and low back with the hip flexion. He reports pain returned with sitting. Still having numbness and pain with sitting.  He is now going to take motrin 800mg and he hopes it helps with pain      Pain:  [x] Yes  [] No Location: Low back Pain Rating: (0-10 scale) 3/10 standing up  Pain altered Tx:  [x] No  [] Yes  Action:  Comments:     Objective:  INTERVENTIONS  INTERVENTIONS  Reps/ Time Weight/ Level Completed  Today Comments          MODALITIES        MHP to L ant hip in prone position with exercises 10'             MANUAL  10'  x    HV to L quad and hip flexors & lateral hip 5'      STM & TPR to L hip flexor and psoas 5'      hypervolt to lumbar paraspinals & glutes 5'  x    PA mobs to lumbar spine &  L SI jt  5'  x           EXERCISES        Hip Flexor stretch 3x30''      Piriformis stretch 3x30\"       Active Hamstring stretch x20 Hold 5s     LTR x20      SLR -- L ONLy 3x10 0-2.5#  x Cues to keep in pain free range   Increased wt after 1st set    SLR with ER 15x      Bridges + ABD 2x10 green x    Marching with TrAb  20x green x Prone hip extension 3x10 2.5#     Prone hip abduction 2x15 2.5#     Cat/camel  x20  x Cue more for tucking hips    Kenneth Archer pose  3x30\"   x    Prone press ups 2x10   x To stretch hip flexor   Prone hip flexor stretch 3x 30\" Strap and towel under knee x    Standing:       QL hip hike 15x   Increased difficulty- visual cues/demonstration needed   3 way hip 10x2 ea 2.5#      Straight arm pull downs  2x20  green  Initially bilateral an progressed to unilateral for greater core challenge; 8/3 performed B and 15x    Palloff press  2x15 ea green  15x only this session 8/3   Postural Education  5'  x Educated on sitting with a more anterior pelvic tilt. Discussed using a pillow at the lumbar spine to promote this when driving. Pt sits like this for >5 minutes in session and notes no increase of symptoms    Pain post session    3/10    Other:  8/8-- provided with a new HEP    Specific Instructions for next treatment: see how using a lumbar roll goes, work on getting more awareness of pelvic position (more anterior tilt), lumbar and hamstring stretching, core strengthening     HEP:  Access Code: [email or web address]  URL: [email or web address]  Date: 07/27/2022  Prepared by: Kaylee Chisholm    Exercises  Hip Hiking on Step - 1 x daily - 7 x weekly - 3 sets - 10 reps  Standing Hip Flexion with Resistance Loop - 1 x daily - 7 x weekly - 3 sets - 10 reps  Hip Extension with Resistance Loop - 1 x daily - 7 x weekly - 3 sets - 10 reps  Standing Hip Abduction Kicks - 1 x daily - 7 x weekly - 3 sets - 10 reps  Modified Sebastián Stretch - 1 x daily - 7 x weekly - 3 sets - 1 min hold        Assessment: [x] Progressing toward goals. Pt still noting pain when hips are flexed beyond 90 degrees. Able to better tolerate flexion when neutral to an anterior tilt pelvis is maintained. Continued with manual to reduce tension and then worked on mobility. Tried adding more work that encouraged pelvis movement to build awareness.  Also worked on core stability. Ended session with education on postural alignment in sitting. Has no/less symptoms with sitting with more anterior pelvic tilt with a lumbar roll. Encouraged pt to trial this at home/work and see if symptoms are better. Will continue to monitor and progress as able. [] No change. [] Other:    [x] Patient would continue to benefit from skilled physical therapy services in order to: lessen pain in the L hip to increase tolerance to sitting. GOALS:   STG/LTG: (to be met in 10 treatments)  Pt will self report worst pain no greater than 2/10 upon arrival to sessions in order to better tolerate ADLs/work activities with minimal dysfunction  Pt will report an improved sitting tolerance to >30 minutes before getting symptoms to allow him to be able to complete work duties. Pt will have improved tolerance to laying flat and on his side with no pain to allow him to have a good night's sleep. Pt will increase L hip strength to 5/5 in all major muscle groups to ensure equal strength to R side and to ensure appropriate hip/pelvic stability with mobility. **further goal to be established once appropriate PRO collected*   Pt will be independent with home program addressing strength, flexibility and balance to maximize gains made in therapy to improve overall functional capacity for mobility. ** further goals to be established with additional assessments**      Pt. Education:  [x] Yes  [] No  [] Reviewed Prior HEP/Ed  Method of Education: [] Verbal  [] Demo  [] Written  Comprehension of Education:  [] Verbalizes understanding. [] Demonstrates understanding. [] Needs review. [] Demonstrates/verbalizes HEP/Ed previously given. Plan: [x] Continue per plan of care.    [] Other:      Treatment Charges: Mins Units   []  Modalities     [x]  Ther Exercise 35 2   [x]  Manual Therapy 10 1   []  Ther Activities     []  Aquatics     []  Neuromuscular     [] Vasocompression     [] Gait Training     [] Dry needling        [] 1 or 2 muscles        [] 3 or more muscles     []  Other     Total Treatment time 45 3     Time In: 5:50p    Time Out: 5:35 p    Electronically signed by:  Jessica Waldrop PT

## 2022-08-15 ENCOUNTER — APPOINTMENT (OUTPATIENT)
Dept: PHYSICAL THERAPY | Age: 42
End: 2022-08-15
Payer: COMMERCIAL

## 2022-08-17 ENCOUNTER — HOSPITAL ENCOUNTER (OUTPATIENT)
Dept: PHYSICAL THERAPY | Age: 42
Setting detail: THERAPIES SERIES
Discharge: HOME | End: 2022-08-17
Payer: COMMERCIAL

## 2022-08-17 PROCEDURE — 97140 MANUAL THERAPY 1/> REGIONS: CPT

## 2022-08-17 PROCEDURE — 97110 THERAPEUTIC EXERCISES: CPT

## 2022-08-17 NOTE — FLOWSHEET NOTE
[facility]   [address]   Phone: [phone]   Fax: [phone]    Physical Therapy Daily Treatment Note      Date:  2022  Patient Name:  Libertad Thomas    :  1980  MRN: [id number]  Physician: PEPE Courtney - FRANCIS                           Insurance: [employer] -- 60/60 combined with OT/SLP. Hard Max   Medical Diagnosis:   M54.42, G89.29 (ICD-10-CM) - Chronic midline low back pain with left-sided sciatica   M25.552 (ICD-10-CM) - Left hip pain                 Rehab Codes: R25 pain , M25.60 stiffness, R53.1 weakness   Onset Date: May 2022                      Next 's appt: 22 - PCP     Visit# / total visits: 9/10  Cancels/No Shows: 0/0    Precautions:  needed -- Jaquelin Morgan [id number]    Subjective: Patient reporting to therapy stating he's doing much better. The massage has been helping in the back but the leg pain remains the same. Patient reporting pain in groin goes from the front and radiates to the back. Patient reports that he tolerates standing well but has increased pain/discomfort when bending fwd. Patient reports pain after his workout and notices the pain more at resting in his back. Patient continues to note pain in L hip when in seated.       Pain:  [x] Yes  [] No Location: Low back Pain Rating: (0-10 scale) 3/10 standing up  Hip- pain when sitting  Pain altered Tx:  [x] No  [] Yes  Action:  Comments:     Objective:  INTERVENTIONS  INTERVENTIONS  Reps/ Time Weight/ Level Completed  Today Comments          MODALITIES        MHP to L ant hip in prone position with exercises 10'             MANUAL  15'  x    HV to L quad and hip flexors & lateral hip 5'      STM & TPR to L hip flexor and psoas 5'  x Added L piriformis   hypervolt to lumbar paraspinals & glutes 5'  x    PA mobs to lumbar spine &  L SI jt  5'  x           EXERCISES        Hip Flexor stretch 3x30''      Piriformis stretch 3x30\"       Active Hamstring stretch x20 Hold 5s     LTR x20      SLR -- L ONLy 3x10 0-2.5#  x Cues to keep in pain free range   Increased wt after 1st set 8/17 no weight d/t inc in discomfort   SLR with ER 15x      Bridges + ABD 2x10 green     Marching with TrAb  20x green     Prone hip extension 3x10 2.5#     Prone hip abduction 2x15 2.5#     Cat/camel  x20  x Cue more for tucking hips; inc pain in L hip with inc camel motion    Law pose  3x30\"   x    Prone press ups 2x10   x To stretch hip flexor   Prone hip flexor stretch 3x 30\" Strap and towel under knee x    Standing:       QL hip hike 15x   Increased difficulty- visual cues/demonstration needed   3 way hip 10x2 ea 2.5#      Straight arm pull downs  2x20  green  Initially bilateral an progressed to unilateral for greater core challenge; 8/3 performed B and 15x    Palloff press  2x10 ea green x Improved tolerance noted 8/17   Palloff walkout 10x ea green x    Postural Education  5'   Educated on sitting with a more anterior pelvic tilt. Discussed using a pillow at the lumbar spine to promote this when driving. Pt sits like this for >5 minutes in session and notes no increase of symptoms    Pain post session    NA    Other:  8/8-- provided with a new HEP    Specific Instructions for next treatment: see how using a lumbar roll goes, work on getting more awareness of pelvic position (more anterior tilt), lumbar and hamstring stretching, core strengthening     HEP:  Access Code: [email or web address]  URL: [email or web address]  Date: 07/27/2022  Prepared by: Nyla Diaz    Exercises  Hip Hiking on Step - 1 x daily - 7 x weekly - 3 sets - 10 reps  Standing Hip Flexion with Resistance Loop - 1 x daily - 7 x weekly - 3 sets - 10 reps  Hip Extension with Resistance Loop - 1 x daily - 7 x weekly - 3 sets - 10 reps  Standing Hip Abduction Kicks - 1 x daily - 7 x weekly - 3 sets - 10 reps  Modified Sebastián Stretch - 1 x daily - 7 x weekly - 3 sets - 1 min hold        Assessment: [x] Progressing toward goals. Continued with core and hip strengthening this session. Reviewed ant pelvic tilt and educated on the importance of core engagement to increase trunk support. Cues given throughout session to correct posture and technique as pt tends to perform exercises too quickly and with decreased control. [] No change. [] Other:    [x] Patient would continue to benefit from skilled physical therapy services in order to: lessen pain in the L hip to increase tolerance to sitting. GOALS:   STG/LTG: (to be met in 10 treatments)  Pt will self report worst pain no greater than 2/10 upon arrival to sessions in order to better tolerate ADLs/work activities with minimal dysfunction  Pt will report an improved sitting tolerance to >30 minutes before getting symptoms to allow him to be able to complete work duties. Pt will have improved tolerance to laying flat and on his side with no pain to allow him to have a good night's sleep. Pt will increase L hip strength to 5/5 in all major muscle groups to ensure equal strength to R side and to ensure appropriate hip/pelvic stability with mobility. **further goal to be established once appropriate PRO collected*   Pt will be independent with home program addressing strength, flexibility and balance to maximize gains made in therapy to improve overall functional capacity for mobility. ** further goals to be established with additional assessments**      Pt. Education:  [x] Yes  [] No  [] Reviewed Prior HEP/Ed  Method of Education: [] Verbal  [] Demo  [] Written  Comprehension of Education:  [] Verbalizes understanding. [] Demonstrates understanding. [] Needs review. [] Demonstrates/verbalizes HEP/Ed previously given. Plan: [x] Continue per plan of care.    [] Other:      Treatment Charges: Mins Units   []  Modalities     [x]  Ther Exercise 35 2   [x]  Manual Therapy 15 1   []  Ther Activities     []  Aquatics     []  Neuromuscular     [] Vasocompression     [] Gait Training     [] Dry needling        [] 1 or 2 muscles        [] 3 or more muscles     []  Other     Total Treatment time 50 3     Time In: 1777   Time Out: 1855    Electronically signed by:  Joni Hodges PTA

## 2022-08-22 ENCOUNTER — HOSPITAL ENCOUNTER (OUTPATIENT)
Dept: PHYSICAL THERAPY | Age: 42
Setting detail: THERAPIES SERIES
Discharge: HOME | End: 2022-08-22
Payer: COMMERCIAL

## 2022-08-22 PROCEDURE — 97110 THERAPEUTIC EXERCISES: CPT

## 2022-08-22 ASSESSMENT — HOOS JR
GOING UP OR DOWN STAIRS: 0
LYING IN BED (TURNING OVER, MAINTAINING HIP POSITION): 0
HOOS JR RAW SCORE: 6
WALKING ON UNEVEN SURFACE: 0
HOOS JR TOTAL INTERVAL SCORE: 70.426
SITTING: 2
BENDING TO THE FLOOR TO PICK UP OBJECT: 2
HOOS JR RAW SCORE: 6
RISING FROM SITTING: 2

## 2022-08-22 NOTE — PROGRESS NOTES
psoas 5'   Added L piriformis   hypervolt to lumbar paraspinals & glutes 5'      PA mobs to lumbar spine &  L SI jt  5'             EXERCISES        Hip Flexor stretch 3x30''      Piriformis stretch 3x30\"       Active Hamstring stretch x20 Hold 5s     LTR x20      SLR -- L ONLy 3x10 2.5#   Cues to keep in pain free range   Increased wt after 1st set 8/17 no weight d/t inc in discomfort   PPT with hands on bottom x15  x    BKFO  2x10 ea  Blue  x    Bridges + ABD 2x10 green     Marching with TrAb  20x  x Hands under butt to ensure best pelvic tilt    Prone swimmers  2x10 ea  x Opposite arm and leg    Cat/camel  x20  x Cue more for tucking hips; inc pain in L hip with inc camel motion    Law pose  3x30\"   x    Prone press ups 2x10   x To stretch hip flexor   Prone hip flexor stretch 2x 30\" Strap and towel under knee x    Seated pelvic tilts  x20  x    Standing:       QL hip hike 15x   Increased difficulty- visual cues/demonstration needed   3 way hip 10x2 ea 2.5#      Straight arm pull downs  2x20  green  Initially bilateral an progressed to unilateral for greater core challenge; 8/3 performed B and 15x    Palloff press  2x10 ea green x Improved tolerance noted 8/17   Palloff walkout 10x ea green x     Education  10'  x Educated on sitting with a more anterior pelvic tilt. Discussed using a pillow at the lumbar spine to promote this when driving. Pt sits like this for >5 minutes in session and notes no increase of symptoms   Educated on the need to work through more extension based movement as this relieves symptoms and flexion increases symptoms. Pain post session        Other:  8/22: JOSE OCONNELL 6 pt -- 30% functional limitation (previously 8pt = 35% functional limitation) // hip strength: 5/5 bilaterally   8/8-- provided with a new HEP    Specific Instructions for next treatment: continue with extension based movements and slowly introduce flexion. Want to work on more core muscle endurance.      HEP:  Access Code: [email or web address]  URL: [email or web address]  Date: 07/27/2022  Prepared by: Taty Guzmán    Exercises  Hip Hiking on Step - 1 x daily - 7 x weekly - 3 sets - 10 reps  Standing Hip Flexion with Resistance Loop - 1 x daily - 7 x weekly - 3 sets - 10 reps  Hip Extension with Resistance Loop - 1 x daily - 7 x weekly - 3 sets - 10 reps  Standing Hip Abduction Kicks - 1 x daily - 7 x weekly - 3 sets - 10 reps  Modified Sebastián Stretch - 1 x daily - 7 x weekly - 3 sets - 1 min hold        Assessment: [x] Progressing toward goals. Patient was initially evaluated on 7/18/22 to address L anterior hip pain that impacts his ability to tolerate sitting for periods of time. This is necessary to complete his job. With POC initially focused on the hip but then it has become apparent that pain is likely related to the low back. Began to find that extension based movement helped relieve some discomfort. Have really focused on this in sessions and pt does report less symptoms post session. He still is getting the anterior leg discomfort limiting sleep and sitting tolerance. Feel that this is correlated to this posture which tends to be forward rounded with decreased lumbar lordosis. When he sits with more lordosis has less LLE symptoms and can tolerate for extended periods of sitting. Does have limited endurance with maintaining this position and needs moderate cues as noted when educating in session. Discussed sitting with feet flat, hips at 90/90 with more anterior pelvic tilt while at work. His pain is improving overall and feel that additional visits would help bring greater postural alignment awareness. Also adding more postural goals to POC as this seems to be a main contributor to pain. Feel that extending his POC to total of 15 visits will allow for the remaining goals to be focused on and pt to lessen the dysfunction he is having.  Also recommended pt reach out to his PCP and schedule a visit to further explore the hip pain if it does not improve in the remaining visits. [] No change. [] Other:    [x] Patient would continue to benefit from skilled physical therapy services in order to: lessen pain in the L hip to increase tolerance to sitting. GOALS:   STG/LTG: (to be met in 10-15 treatments)  Pt will self report worst pain no greater than 2/10 upon arrival to sessions in order to better tolerate ADLs/work activities with minimal dysfunction   - 8/22: progressing pain -- 3-4/10 upon arriving to sessions compared to 5/10 he had at [facility]. He also gets relief in sessions   Pt will report an improved sitting tolerance to >30 minutes before getting symptoms to allow him to be able to complete work duties. -8/22: progressing -- has radiating pain with sitting > 30 minutes   Pt will have improved tolerance to laying flat and on his side with no pain to allow him to have a good night's sleep.   -8/22: MET -- no pain with side lying, more when on his back   Pt will increase L hip strength to 5/5 in all major muscle groups to ensure equal strength to R side and to ensure appropriate hip/pelvic stability with mobility. - 8/22: MET   Pt will improve score on HOOS JR to less than 25% functional limitation to note improved control of hip symptoms with functional activities. -8/22: Progressing -- 5% improvement to 30% functional limitation   Pt will be independent with home program addressing strength, flexibility and balance to maximize gains made in therapy to improve overall functional capacity for mobility.     -8/22: progressing -- doing exercises intermittently and has not been doing foam roll consistently. 7. NEW GOAL 8/22: Pt will demonstrate improved postural awareness with only minimal cues to correct posture throughout session to prevent undue stress to spine and causing LLE pain.        Pt. Education:  [x] Yes  [] No  [x] Reviewed Prior HEP/Ed  Method of Education: [x] Verbal  [x] Demo  [] Written  Comprehension of Education:  [x] Verbalizes understanding. [] Demonstrates understanding. [] Needs review. [x] Demonstrates/verbalizes HEP/Ed previously given. Plan: [] Continue per plan of care. [x] Other: see below     Treatment Plan:  [x] Therapeutic Exercise   42910  [] Iontophoresis: 4 mg/mL Dexamethasone Sodium Phosphate  mAmin  86594   [x] Therapeutic Activity  74827 [] Vasopneumatic cold with compression  62786    [] Gait Training   32368 [] Ultrasound   03568   [x] Neuromuscular Re-education  41449 [] Electrical Stimulation Unattended  99842   [x] Manual Therapy  91912 [] Electrical Stimulation Attended  90343   [x] Instruction in HEP  [] Lumbar/Cervical Traction  29096   [] Aquatic Therapy   60706 [] Cold/hotpack    [] Massage   46173      [] Dry Needling, 1 or 2 muscles  19472   [] Biofeedback, first 15 minutes   39850  [] Biofeedback, additional 15 minutes   34179 [] Dry Needling, 3 or more muscles  10164      Patient Status:     [] Continue per initial plan of care. [x] Additional visits necessary. -- extending POC due to more focused goals for x5 visits     [] Other:     Requested Frequency/Duration: 1-2 times per week for total of 15 visits (10+5)                  Treatment Charges: Mins Units   []  Modalities     [x]  Ther Exercise 55 4   []  Manual Therapy     []  Ther Activities     []  Aquatics     []  Neuromuscular     [] Vasocompression     [] Gait Training     [] Dry needling        [] 1 or 2 muscles        [] 3 or more muscles     []  Other     Total Treatment time 55 4     Time In: 5963   Time Out: [address] [facility]     Electronically signed by:  Simon Kruger PT

## 2022-08-29 ENCOUNTER — HOSPITAL ENCOUNTER (OUTPATIENT)
Dept: PHYSICAL THERAPY | Age: 42
Setting detail: THERAPIES SERIES
Discharge: HOME | End: 2022-08-29
Payer: COMMERCIAL

## 2022-08-29 PROCEDURE — 97110 THERAPEUTIC EXERCISES: CPT

## 2022-08-29 NOTE — PROGRESS NOTES
[facility]   [facility]   Phone: [phone]   Fax: [phone]    Physical Therapy Daily Treatment Note/ Progress Note       Date:  2022  Patient Name:  Natalia Reyes    :  1980  MRN: [id number]  Physician: PEPE Kasper - CNP                           Insurance: [employer] --  combined with OT/SLP. Hard Max   Medical Diagnosis:   M54.42, G89.29 (ICD-10-CM) - Chronic midline low back pain with left-sided sciatica   M25.552 (ICD-10-CM) - Left hip pain                 Rehab Codes: R25 pain , M25.60 stiffness, R53.1 weakness   Onset Date: May 2022                      Next 's appt: 22 - PCP     Visit# / total visits: 10/15  Cancels/No Shows: 0/0  Date of initial visit: 22        Date of PN: 22 (visit 10)  Formal progress note reporting period:  22 - 22     Precautions:  needed -- Dalton Blas [id number]# & Dmitry Perez #[id number]    Subjective: Patient arrives with complaints of low back pain and states his pain continues to get worse at night. 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      Pain:  [x] Yes  [] No Location: Lower back   Pain Rating: (0-10 scale) 4-5/10   Pain altered Tx:  [x] No  [] Yes  Action:  Comments:     Objective:  INTERVENTIONS  INTERVENTIONS  Reps/ Time Weight/ Level Completed  Today Comments          MODALITIES        MHP to L ant hip in prone position with exercises 10'             MANUAL  15'      HV to L quad and hip flexors & lateral hip 5'      STM & TPR to L hip flexor and psoas 5'   Added L piriformis   hypervolt to lumbar paraspinals & glutes 5'      PA mobs to lumbar spine &  L SI jt  5'             EXERCISES        Hip Flexor stretch 3x30''      Piriformis stretch 2x30\"   x    Active Hamstring stretch x20 Hold 5s     LTR x20  x    SLR -- L ONLY 3x10 2.5#   Cues to keep in pain free range Hip Abduction Kicks - 1 x daily - 7 x weekly - 3 sets - 10 reps  Modified Sebastián Stretch - 1 x daily - 7 x weekly - 3 sets - 1 min hold        Assessment: [x] Progressing toward goals. Initiated session with mat level stretching and exercises focused on improving trunk and LE mobility and strength. Patient attempted bridges with abduction but stated it was too painful for his back this date. Continued with standing exercises to focus on posture while engaging trunk and core muscles. Patient required occasional visual and verbal cues to correct rounded shoulders and slightly forward flexed appearance. Added standing marches with ankle weights to strengthen BLE, but also improve balance without UE support and increasing SLS time. Ended session with review of stretches to do when low back pain and BLE pain occurs, and also reviewed sleeping positions in order to decrease pain at night.     [] No change. [] Other:    [x] Patient would continue to benefit from skilled physical therapy services in order to: lessen pain in the L hip to increase tolerance to sitting. GOALS:   STG/LTG: (to be met in 10-15 treatments)  Pt will self report worst pain no greater than 2/10 upon arrival to sessions in order to better tolerate ADLs/work activities with minimal dysfunction   - 8/22: progressing pain -- 3-4/10 upon arriving to sessions compared to 5/10 he had at [facility]. He also gets relief in sessions   Pt will report an improved sitting tolerance to >30 minutes before getting symptoms to allow him to be able to complete work duties.    -8/22: progressing -- has radiating pain with sitting > 30 minutes   Pt will have improved tolerance to laying flat and on his side with no pain to allow him to have a good night's sleep.   -8/22: MET -- no pain with side lying, more when on his back   Pt will increase L hip strength to 5/5 in all major muscle groups to ensure equal strength to R side and to ensure appropriate hip/pelvic stability with mobility. - 8/22: MET   Pt will improve score on HOOS JR to less than 25% functional limitation to note improved control of hip symptoms with functional activities. -8/22: Progressing -- 5% improvement to 30% functional limitation   Pt will be independent with home program addressing strength, flexibility and balance to maximize gains made in therapy to improve overall functional capacity for mobility.     -8/22: progressing -- doing exercises intermittently and has not been doing foam roll consistently. 7. NEW GOAL 8/22: Pt will demonstrate improved postural awareness with only minimal cues to correct posture throughout session to prevent undue stress to spine and causing LLE pain. Pt. Education:  [x] Yes  [] No  [x] Reviewed Prior HEP/Ed  Method of Education: [x] Verbal  [x] Demo  [] Written  Comprehension of Education:  [x] Verbalizes understanding. [] Demonstrates understanding. [] Needs review. [x] Demonstrates/verbalizes HEP/Ed previously given. Plan: [x] Continue per plan of care.    [] Other:          Treatment Charges: Mins Units   []  Modalities     [x]  Ther Exercise 46 3   []  Manual Therapy     []  Ther Activities     []  Aquatics     []  Neuromuscular     [] Vasocompression     [] Gait Training     [] Dry needling        [] 1 or 2 muscles        [] 3 or more muscles     []  Other     Total Treatment time 46 3     Time In: 5002   Time Out: 5088    Electronically signed by:  Cristy Gomez PTA

## 2022-08-31 ENCOUNTER — HOSPITAL ENCOUNTER (OUTPATIENT)
Dept: PHYSICAL THERAPY | Age: 42
Setting detail: THERAPIES SERIES
Discharge: HOME | End: 2022-08-31
Payer: COMMERCIAL

## 2022-08-31 PROCEDURE — 97110 THERAPEUTIC EXERCISES: CPT

## 2022-08-31 NOTE — FLOWSHEET NOTE
[facility]   [address]   Phone: [phone]   Fax: [phone]    Physical Therapy Daily Treatment Note       Date:  2022  Patient Name:  Ed Ramos    :  1980  MRN: [id number]  Physician: PEPE Gonzalez - CNP                           Insurance: [facility] -- 60/60 combined with OT/SLP. 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 He is now having pain into the L leg.      Pain:  [x] Yes  [] No Location: L leg- back of the thigh to he foot    Pain Rating: (0-10 scale) 5/10   Pain altered Tx:  [x] No  [] Yes  Action:  Comments:     Objective:  INTERVENTIONS  INTERVENTIONS  Reps/ Time Weight/ Level Completed  Today Comments          MODALITIES        MHP to L ant hip in prone position with exercises 10'             MANUAL  15'      HV to L quad and hip flexors & lateral hip 5'      STM & TPR to L hip flexor and psoas 5'   Added L piriformis   hypervolt to lumbar paraspinals & glutes 5'      PA mobs to lumbar spine &  L SI jt  5'             EXERCISES        Hip Flexor stretch 3x30''      Piriformis stretch 2x30\"   x    Active Hamstring stretch x20 Hold 5s x    LTR x20  x    SLR -- L ONLY 3x10 2.5#   Cues to keep in pain free range   Increased wt after 1st set  no weight d/t inc in discomfort   PPT with hands on bottom 15x5''  x    BKFO  2x10 ea  Blue  x    Bridges + ABD 2x10 blue x    Dead bugs 3x30s  x    Prone swimmers  3x30s  x Opposite arm and leg    Cat/camel  x20   Cue more for tucking hips; inc pain in L hip with inc camel motion    Law pose  2x30\"   x    Prone press ups 2x10   x To stretch hip flexor   Prone hip flexor stretch 2x 30\" Strap and towel under knee     Seated pelvic tilts  x20      Standing:       QL hip hike 15x   Increased difficulty- visual cues/demonstration needed   3 way hip 15x2 ea 3# x  Cues for core activation and posture    Straight arm pull downs  2x20  Blue  Unilateral; progressed    Palloff press  2x10 ea green  Improved tolerance noted 8/17   Palloff walkout 10x ea Blue     Marching with SLS 2x10 2.5#  3'' SLS with each march    Education  10'   Educated on sitting with a more anterior pelvic tilt. Discussed using a pillow at the lumbar spine to promote this when driving. Pt sits like this for >5 minutes in session and notes no increase of symptoms   Educated on the need to work through more extension based movement as this relieves symptoms and flexion increases symptoms. Pain post session        Other:  8/22: JOSE OCONNELL 6 pt -- 30% functional limitation (previously 8pt = 35% functional limitation) // hip strength: 5/5 bilaterally   8/8-- provided with a new HEP    Specific Instructions for next treatment: work on loading glute med, glute max, core, and hip flexors to tolerance; if no improvement send back to MD     HEP:  Access Code: [email or web address]  URL: [email or web address]  Date: 07/27/2022  Prepared by: Taty Guzmán    Exercises  Hip Hiking on Step - 1 x daily - 7 x weekly - 3 sets - 10 reps  Standing Hip Flexion with Resistance Loop - 1 x daily - 7 x weekly - 3 sets - 10 reps  Hip Extension with Resistance Loop - 1 x daily - 7 x weekly - 3 sets - 10 reps  Standing Hip Abduction Kicks - 1 x daily - 7 x weekly - 3 sets - 10 reps  Modified Sebastián Stretch - 1 x daily - 7 x weekly - 3 sets - 1 min hold        Assessment: [x] Progressing toward goals.  Initiated session with mat level stretching and exercises focused on improving trunk and LE mobility and strength. Pt noting a pain with piriformis stretch in glutes but feel this was a stretch and encouraged pt to continue. Emphasized in sessions that things might be sore or slightly painful in the moment but with time and strength increase this should improve. Increased reps for prone swimmers and bird dogs to be done for time to work on muscle endurance. Patient required occasional visual and verbal cues to correct rounded shoulders and slightly forward flexed appearance. Pt still noting a numbness in the anterior thigh and down the posterior LLE post session but it improves with movement. Encouraged pt to continue with HEP to tolerance and try not to increase activity too quickly. [] No change. [] Other:    [x] Patient would continue to benefit from skilled physical therapy services in order to: lessen pain in the L hip to increase tolerance to sitting. GOALS:   STG/LTG: (to be met in 10-15 treatments)  Pt will self report worst pain no greater than 2/10 upon arrival to sessions in order to better tolerate ADLs/work activities with minimal dysfunction   - 8/22: progressing pain -- 3-4/10 upon arriving to sessions compared to 5/10 he had at [facility]. He also gets relief in sessions   Pt will report an improved sitting tolerance to >30 minutes before getting symptoms to allow him to be able to complete work duties. -8/22: progressing -- has radiating pain with sitting > 30 minutes   Pt will have improved tolerance to laying flat and on his side with no pain to allow him to have a good night's sleep.   -8/22: MET -- no pain with side lying, more when on his back   Pt will increase L hip strength to 5/5 in all major muscle groups to ensure equal strength to R side and to ensure appropriate hip/pelvic stability with mobility.    - 8/22: MET   Pt will improve score on HOOS JR to less than 25% functional limitation to note improved control of hip symptoms with functional activities. -8/22: Progressing -- 5% improvement to 30% functional limitation   Pt will be independent with home program addressing strength, flexibility and balance to maximize gains made in therapy to improve overall functional capacity for mobility.     -8/22: progressing -- doing exercises intermittently and has not been doing foam roll consistently. 7. NEW GOAL 8/22: Pt will demonstrate improved postural awareness with only minimal cues to correct posture throughout session to prevent undue stress to spine and causing LLE pain. Pt. Education:  [x] Yes  [] No  [x] Reviewed Prior HEP/Ed  Method of Education: [x] Verbal  [x] Demo  [] Written  Comprehension of Education:  [x] Verbalizes understanding. [] Demonstrates understanding. [] Needs review. [x] Demonstrates/verbalizes HEP/Ed previously given. Plan: [x] Continue per plan of care.    [] Other:          Treatment Charges: Mins Units   []  Modalities     [x]  Ther Exercise 46 3   []  Manual Therapy     []  Ther Activities     []  Aquatics     []  Neuromuscular     [] Vasocompression     [] Gait Training     [] Dry needling        [] 1 or 2 muscles        [] 3 or more muscles     []  Other     Total Treatment time 46 3     Time In:  6:17p Time Out: 7457    Electronically signed by:  Cade Connors PT

## 2022-09-01 ENCOUNTER — TELEPHONE (OUTPATIENT)
Dept: GASTROENTEROLOGY | Age: 42
End: 2022-09-01

## 2022-09-01 NOTE — TELEPHONE ENCOUNTER
Pt called to see if he could schedule a sooner appt, then his follow up on 11/9. Writer was unable to find a sooner appt. Pt wanted provider know that he is having pain by his anus that comes and go's. Pt also reported having a bump come out, around this area. Pt would like to know if he needs to be seen sooner.

## 2022-09-07 ENCOUNTER — HOSPITAL ENCOUNTER (OUTPATIENT)
Dept: PHYSICAL THERAPY | Age: 42
Setting detail: THERAPIES SERIES
Discharge: HOME | End: 2022-09-07
Payer: COMMERCIAL

## 2022-09-07 PROCEDURE — 97110 THERAPEUTIC EXERCISES: CPT

## 2022-09-07 NOTE — FLOWSHEET NOTE
[facility]   [address]   Phone: [phone]   Fax: [phone]    Physical Therapy Daily Treatment Note       Date:  2022  Patient Name:  Stephanie Duckworth    :  1980  MRN: [id number]  Physician: PEPE Casey - CNP                           Insurance: Domingo Smart -- 60 combined with OT/SLP. Hard Max   Medical Diagnosis:   M54.42, G89.29 (ICD-10-CM) - Chronic midline low back pain with left-sided sciatica   M25.552 (ICD-10-CM) - Left hip pain                 Rehab Codes: R25 pain , M25.60 stiffness, R53.1 weakness   Onset Date: May 2022                      Next 's appt: 22 - PCP     Visit# / total visits: 13/15  Cancels/No Shows: 0/0  Date of initial visit: 22        Date of PN: 22 (visit 10)      Precautions:  needed -- Alize Arora #[id number], PABLITOOR #[id number]    Subjective: Patient reports feeling well and states he is noticing improvement during the day with his low back.     Pain:  [x] Yes  [] No Location: Low back    Pain Rating: (0-10 scale) /10   Pain altered Tx:  [x] No  [] Yes  Action:  Comments:     Objective:  INTERVENTIONS  INTERVENTIONS  Reps/ Time Weight/ Level Completed  Today Comments          MODALITIES        MHP to L ant hip in prone position with exercises 10'             MANUAL  15'      HV to L quad and hip flexors & lateral hip 5'      STM & TPR to L hip flexor and psoas 5'   Added L piriformis   hypervolt to lumbar paraspinals & glutes 5'      PA mobs to lumbar spine &  L SI jt  5'             EXERCISES        Hip Flexor stretch 3x30''      Piriformis stretch 2x30\"   x    Active Hamstring stretch x20 Hold 5s     LTR x20  x    SLR -- L ONLY 3x10 2.5#   Cues to keep in pain free range   Increased wt after 1st set  no weight d/t inc in discomfort   PPT with hands on bottom 15x5''  x    BKFO  2x15 ea  Blue  x    Bridges + ABD 2x10 Blue x    Dead bugs  20x  x    Prone swimmers  20x  x Opposite arm and leg    Cat/camel  20x  x Cue more for tucking hips; inc pain in L hip with inc camel motion    Law pose  3x30\"   x Into press up for hip flexor stretch   Prone press ups 2x10    To stretch hip flexor   Prone hip flexor stretch 3x30\" Strap and towel under knee     Seated pelvic tilts  x20             Standing:       QL hip hike 2x10 ea  x    3 way hip 15x2 ea 3#   Cues for core activation and posture    Straight arm pull downs  2x20  Blue x Unilateral; progressed    Palloff press  2x15 ea Green x    Palloff walkout 10x ea Blue x    Marching with SLS 2x10 2.5#  3'' SLS with each march    Education  10'   Educated on sitting with a more anterior pelvic tilt. Discussed using a pillow at the lumbar spine to promote this when driving. Pt sits like this for >5 minutes in session and notes no increase of symptoms   Educated on the need to work through more extension based movement as this relieves symptoms and flexion increases symptoms. Pain post session        Other:  8/22: JOSE OCONNELL 6 pt -- 30% functional limitation (previously 8pt = 35% functional limitation) // hip strength: 5/5 bilaterally   8/8-- provided with a new HEP    Specific Instructions for next treatment: work on loading glute med, glute max, core, and hip flexors to tolerance; if no improvement send back to MD     HEP:  Access Code: [email or web address]  URL: [email or web address]  Date: 07/27/2022  Prepared by: Sophy Greenberg    Exercises  Hip Hiking on Step - 1 x daily - 7 x weekly - 3 sets - 10 reps  Standing Hip Flexion with Resistance Loop - 1 x daily - 7 x weekly - 3 sets - 10 reps  Hip Extension with Resistance Loop - 1 x daily - 7 x weekly - 3 sets - 10 reps  Standing Hip Abduction Kicks - 1 x daily - 7 x weekly - 3 sets - 10 reps  Modified Sebastián Stretch - 1 x daily - 7 x weekly - 3 sets - 1 min hold        Assessment: [x] Progressing toward goals.  Began session focused on mat level stretches and exercises to improve trunk mobility and strengthening. Patient noted that the dead bugs did not cause any pain this session, as they have in the past. Continued with standing exercises in order to focus more on his posture while strengthening core and trunk musculature. Patient still tends to have a forward lean with rounded shoulders, requiring visual and verbal cues to improve. Ended session with review of improvements and what pt should continue to work on at home. [] No change. [] Other:    [x] Patient would continue to benefit from skilled physical therapy services in order to: lessen pain in the L hip to increase tolerance to sitting. GOALS:   STG/LTG: (to be met in 10-15 treatments)  Pt will self report worst pain no greater than 2/10 upon arrival to sessions in order to better tolerate ADLs/work activities with minimal dysfunction   - 8/22: progressing pain -- 3-4/10 upon arriving to sessions compared to 5/10 he had at [facility]. He also gets relief in sessions   Pt will report an improved sitting tolerance to >30 minutes before getting symptoms to allow him to be able to complete work duties. -8/22: progressing -- has radiating pain with sitting > 30 minutes   Pt will have improved tolerance to laying flat and on his side with no pain to allow him to have a good night's sleep.   -8/22: MET -- no pain with side lying, more when on his back   Pt will increase L hip strength to 5/5 in all major muscle groups to ensure equal strength to R side and to ensure appropriate hip/pelvic stability with mobility. - 8/22: MET   Pt will improve score on HOOS JR to less than 25% functional limitation to note improved control of hip symptoms with functional activities.    -8/22: Progressing -- 5% improvement to 30% functional limitation   Pt will be independent with home program addressing strength, flexibility and balance to maximize gains made in therapy to improve overall functional capacity for mobility.     -8/22: progressing -- doing exercises intermittently and has not been doing foam roll consistently. 7. NEW GOAL 8/22: Pt will demonstrate improved postural awareness with only minimal cues to correct posture throughout session to prevent undue stress to spine and causing LLE pain. Pt. Education:  [x] Yes  [] No  [x] Reviewed Prior HEP/Ed  Method of Education: [x] Verbal  [x] Demo  [] Written  Comprehension of Education:  [x] Verbalizes understanding. [] Demonstrates understanding. [] Needs review. [x] Demonstrates/verbalizes HEP/Ed previously given. Plan: [x] Continue per plan of care.    [] Other:          Treatment Charges: Mins Units   []  Modalities     [x]  Ther Exercise 38 3   []  Manual Therapy     []  Ther Activities     []  Aquatics     []  Neuromuscular     [] Vasocompression     [] Gait Training     [] Dry needling        [] 1 or 2 muscles        [] 3 or more muscles     []  Other     Total Treatment time 38 3     Time In:  6:07pm       Time Out: 6:45pm    Electronically signed by:  Susi Brown PTA

## 2022-09-08 DIAGNOSIS — K62.89 RECTAL PAIN: Primary | ICD-10-CM

## 2022-09-12 ENCOUNTER — CLINICAL DOCUMENTATION (OUTPATIENT)
Dept: PHYSICAL THERAPY | Age: 42
End: 2022-09-12

## 2022-09-12 ENCOUNTER — HOSPITAL ENCOUNTER (OUTPATIENT)
Dept: PHYSICAL THERAPY | Age: 42
Setting detail: THERAPIES SERIES
Discharge: HOME | End: 2022-09-12
Payer: COMMERCIAL

## 2022-09-12 PROCEDURE — 97110 THERAPEUTIC EXERCISES: CPT

## 2022-10-11 DIAGNOSIS — G89.29 CHRONIC MIDLINE LOW BACK PAIN WITH LEFT-SIDED SCIATICA: ICD-10-CM

## 2022-10-11 DIAGNOSIS — M54.42 CHRONIC MIDLINE LOW BACK PAIN WITH LEFT-SIDED SCIATICA: ICD-10-CM

## 2022-10-11 RX ORDER — TIZANIDINE 2 MG/1
TABLET ORAL
Qty: 30 TABLET | Refills: 2 | Status: SHIPPED | OUTPATIENT
Start: 2022-10-11

## 2022-11-07 ASSESSMENT — ENCOUNTER SYMPTOMS
RESPIRATORY NEGATIVE: 1
COUGH: 0
SORE THROAT: 0
COLOR CHANGE: 0
DIARRHEA: 0
VOMITING: 0
CHEST TIGHTNESS: 0
SHORTNESS OF BREATH: 0
EYE PAIN: 0
TROUBLE SWALLOWING: 0
ABDOMINAL PAIN: 0
NAUSEA: 0
APNEA: 0

## 2022-11-08 NOTE — PROGRESS NOTES
[facility] PHYSICIANS  [facility] [address] [facility] [address].  [address] [address]  Dept: PArethaOAretha [address] (:  1980) is a 36 y.o. male. Patient is here for evaluation of the following chief complaint(s):  Chief Complaint   Patient presents with    Back Pain     PAIN SCORE: 8/10/ HARD TO SIT DOWN TO RELAX    Immunizations     NO TO FLU VACCINE      SUBJECTIVE/OBJECTIVE:  HPI  Karen Woods is a 36 y.o. male patient. Patient is an established patient of mine. Patient has a known history of low back pain, cervical pain, Hyperlipidemia . Vitamin b 12 deficiency, shift work disorder, and obesity    BACK PAIN/ DEGENERATIVE ARTHRITIS  Patient had some PT had some relief. But continues to have pain and some radiation to his left hip and left leg. Symptoms have been going on for 6 months. Patient drives a fork lift. DId PT, chiropractor, massage still with discomfort. NECK PAIN  Karen Woods is a 36 y.o. male patient complains of neck pain, non traumatic. Worsened after back started having pain. No arm pain or numbness. PREVIOUS NOTES: Non traumatic. 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 Positive left leg radiation. No pain when laying down. He noticed some muscle aches in the left hip/thigh and gluteal region after walking a lot. He denies cervical, thoracic or midline lumbar back pain. The pain in anterior and lateral hip area, with radiation down the leg to the foot. He has tried tylenol with some relief. We will try naproxen and tizanidinehelps will increase dose. Sindi Castanon FINDINGS:   Lumbar spine alignment is anatomic. No acute osseous abnormality. Vertebral   body axial heights maintained. Mild degenerative change throughout most   significant L4-5 L5-S1 with loss disc height endplate spondylosis. Mild   facet arthropathy lower lumbar spine. There is calcification abdominal aorta. Impression   No acute findings. Multilevel degenerative change. SHIFT WORK SLEEP DISORDER/INSOMNIA/FATIGUE- melatonin helps to some degree takes 2 with better success. He sometimes takes 2 tablets. He likes to take it 2-3 hours before he goes to bed so that he is sleepy by that time. He does wake up during the night sometimes. He states that sometimes after work he wants to do things but does not have the energy to do it. He has not have a b12 or folate level checked. Blood count and TSH was normal in April. Will check a b12 and folate level. Patient works [employer] and does shiftwork. He c/o some hypersomnia and insomnia. He was given some melatonin with mild success. Dose was increased to 2 mg. Patient reports better success. Esteban Borjas is currently on Livalo. Had some body aches with pravastatin. Patient denies adverse reaction to this medication. Compliance with treatment thus far has been good. The patient is not known to have coexisting coronary artery disease. The 10-year CVD risk score (D'Agostino, et al., 2008) is: 3.5%    Values used to calculate the score:      Age: 36 years      Sex: Male      Diabetic: No      Tobacco smoker: No      Systolic Blood Pressure: 070 mmHg      Is BP treated: No      HDL Cholesterol: 47 mg/dL      Total Cholesterol: 201 mg/dL  Lab Results   Component Value Date/Time    CHOLFAST 201 (H) 04/14/2022 09:04 AM    CHOL 165 07/21/2018 07:47 AM    HDL 47 04/14/2022 09:04 AM    LDLCHOLESTEROL 130 04/14/2022 09:04 AM    TRIGLYCFAST 122 04/14/2022 09:04 AM    CHOLHDLRATIO 4.3 04/14/2022 09:04 AM    TRIG 48 07/21/2018 07:47 AM    VLDL NOT REPORTED (H) 02/09/2021 09:56 AM     VITAMIN B 12 /FOLATE DEFICIENCIES  Santiago Foster  is  taking Vitamin B12 supplementation. Santiago Foster reports some numbness and tingling and fatigue.   Lab Results   Component Value Date    [id number] 894 07/12/2022     Lab Results   Component Value Date/Time    FOLATE 15.5 07/12/2022 04:17 PM     WEIGHT  Patient's BMI is Body mass index is 32.16 kg/m². kg/m2. BMI is increasing. Patient understands that this condition increases the patient's risk for chronic conditions. Wt Readings from Last 3 Encounters:   11/11/22 217 lb 12.8 oz (98.8 kg)   11/09/22 215 lb (97.5 kg)   08/09/22 210 lb 6.4 oz (95.4 kg)       VITAL SIGNS:  Vitals:    11/11/22 1422   BP: 100/74   Pulse: 88   Temp: 97.5 °F (36.4 °C)   SpO2: 98%   Weight: 217 lb 12.8 oz (98.8 kg)   Height: 5' 9\" (1.753 m)   Estimated body mass index is 32.16 kg/m² as calculated from the following:    Height as of this encounter: 5' 9\" (1.753 m). Weight as of this encounter: 217 lb 12.8 oz (98.8 kg). Review of Systems   Constitutional:  Positive for activity change. Negative for chills and fever. HENT:  Negative for congestion, ear pain, sore throat and trouble swallowing. Eyes:  Negative for pain and visual disturbance. Respiratory: Negative. Negative for apnea, cough, chest tightness and shortness of breath. Cardiovascular: Negative. Negative for chest pain and palpitations. Gastrointestinal:  Negative for abdominal pain, diarrhea, nausea and vomiting. Endocrine: Negative for cold intolerance and heat intolerance. Genitourinary:  Negative for difficulty urinating, dysuria, frequency and genital sores. Musculoskeletal:  Positive for arthralgias, gait problem, myalgias, neck pain and neck stiffness. Leg, hip, back leg pain   Skin:  Negative for color change and rash. Neurological:  Positive for numbness. Negative for weakness, light-headedness and headaches. Psychiatric/Behavioral:  Negative for agitation, behavioral problems, decreased concentration, sleep disturbance and suicidal ideas. The patient is nervous/anxious. Physical Exam  Vitals and nursing note reviewed. Constitutional:       Appearance: He is well-developed. He is obese. HENT:      Head: Normocephalic.       Right Ear: External ear normal. Left Ear: External ear normal.      Nose: Nose normal.   Eyes:      Conjunctiva/sclera: Conjunctivae normal.      Pupils: Pupils are equal, round, and reactive to light. Cardiovascular:      Rate and Rhythm: Normal rate. Pulses: Normal pulses. Pulmonary:      Effort: Pulmonary effort is normal.      Breath sounds: Normal breath sounds. Abdominal:      General: Abdomen is protuberant. Bowel sounds are normal. There is no distension. Palpations: Abdomen is soft. Tenderness: There is no abdominal tenderness. Hernia: No hernia is present. Musculoskeletal:      Cervical back: No tenderness or bony tenderness. Decreased range of motion. Thoracic back: Normal range of motion. Lumbar back: Decreased range of motion. Positive left straight leg raise test. Negative right straight leg raise test.      Left hip: Tenderness present. Decreased range of motion. Left foot: No swelling. Skin:     General: Skin is warm and dry. Capillary Refill: Capillary refill takes less than 2 seconds. Neurological:      Mental Status: He is alert and oriented to person, place, and time. Sensory: Sensory deficit present. Comments: Decreased sensation down left leg   Psychiatric:         Mood and Affect: Mood is anxious. Speech: Speech is not rapid and pressured. Thought Content: Thought content does not include homicidal or suicidal ideation. MEDICAL HISTORY      Diagnosis Date    GERD (gastroesophageal reflux disease)     Irritable bowel syndrome       MEDICATIONS  Prior to Visit Medications    Medication Sig Taking?  Authorizing Provider   vitamin B-12 (CYANOCOBALAMIN) 250 MCG tablet Take 1 tablet by mouth daily Yes PEPE Patterson CNP   tiZANidine (ZANAFLEX) 4 MG tablet TAKE 1 TABLET BY MOUTH EVERY NIGHT AS NEEDED FOR STIFFNESS Yes PEPE Patterson CNP   dicyclomine (BENTYL) 10 MG capsule Take 1 capsule by mouth 4 times daily Yes Johnny Cruz MD   omeprazole (PRILOSEC OTC) 20 MG tablet Take 1 tablet by mouth daily Yes Adelfo Peterson MD   naproxen (NAPROSYN) 500 MG tablet Take 1 tablet by mouth 2 times daily (with meals) Yes PEPE Patterson CNP   pitavastatin (LIVALO) 1 MG TABS tablet Take 1 tablet by mouth nightly Yes Adry Hua MD   hydrocortisone 2.5 % ointment Apply topically 2 times daily. Yes Adry Hua MD   triamcinolone (KENALOG) 0.1 % cream Apply topically 2 times daily, until rash gone Yes PEPE Werner CNP   melatonin 1 MG tablet Take 1 tablet by mouth nightly Yes PEPE Patterson CNP   Cholecalciferol (VITAMIN D3) 25 MCG (1000 UT) TABS TAKE 1 TABLET BY MOUTH DAILY Yes PEPE Patterson CNP       ASSESSMENT/PLAN:  1. Chronic midline low back pain with left-sided sciatica  Worsening  Continue current therapy. DISCUSSED AND ADVISED TO:  Use heat packs 15 to 20 mins every 2-3 hours. Do some back stretches as tolerated. Refer to hand out for instructions. Call for worsening, numbness, weakness.    - MRI LUMBAR SPINE [address]; Future  - tiZANidine (ZANAFLEX) 4 MG tablet; TAKE 1 TABLET BY MOUTH EVERY NIGHT AS NEEDED FOR STIFFNESS  Dispense: 30 tablet; Refill: 1    2. Cervical pain  Worsening  Continue current therapy. DISCUSSED and ADVISED TO:  Stay at a healthy weight. Continue exercises/PT  Stretch to help prevent stiffness and to prevent injury before exercise. Gentle forms of yoga help keep joints and muscles flexible. Walk instead of jog, ride a bike, swim, and water exercise. Lift weights as tolerated. strong muscles help reduce stress on joints. Take pain medicines exactly as directed and only as needed. - XR CERVICAL SPINE (2-3 VIEWS); Future    3. Mixed hyperlipidemia  Stable  Continue therapy. Advised to decrease the consumption of red meats, fried foods, trans fats, sweets, sugary beverages. Advised to increase fish, vegetables, and fruits consumption.    Advised to add fiber or OTC supplements in diet. Discussed weight loss which will result in improvement of lipids levels. Advised to increase daily physical activities and add regular exercises. 4. Vitamin B 12 deficiency  Stable  Continue current therapy  Continue to monitor    - vitamin B-12 (CYANOCOBALAMIN) 250 MCG tablet; Take 1 tablet by mouth daily  Dispense: 90 tablet; Refill: 1    5. Shift work sleep disorder  Worsening  Continue current routine or therapy. DISCUSSED AND ADVISED TO:  Cut down intake of drinks with caffeine, such as coffee or black tea, for 8 hours before bed. Cut down on smoking or use other types of tobacco near bedtime. Cut down on Nicotine and alcohol   Stop eating a big meal close to bedtime. Stop drinking a lot of  fluids close to bedtime. 6. Class 1 obesity due to excess calories with serious comorbidity and body mass index (BMI) of 32.0 to 32.9 in adult  Stable  BMI stable  DISCUSSED AND ADVISED TO:  Eat a low-fat and low carbohydrates diet. Avoid fried foods especially fast food. Choose healthier options for snacks. Have 5-6 servings of fruits and vegetables per day. Cut down on eating processed food. Add 30 minutes to 1 hour aerobic exercise for 3-4 days a week. On this date 11/11/2022 I have spent 35 minutes reviewing previous notes, test results and face to face with the patient discussing the diagnosis and importance of compliance with the treatment plan as well as documenting on the day of the visit. This note was completed by using the assistance of a speech-recognition program. However, inadvertent computerized transcription errors may be present. Although every effort was made to ensure accuracy, no guarantees can be provided that every mistake has been identified and corrected by editing.   Electronically signed by PEPE Suarez CNP on 6/17/21 at 9:57 PM EDT     --PEPE Suarez CNP

## 2022-11-09 ENCOUNTER — OFFICE VISIT (OUTPATIENT)
Dept: GASTROENTEROLOGY | Age: 42
End: 2022-11-09
Payer: COMMERCIAL

## 2022-11-09 VITALS
DIASTOLIC BLOOD PRESSURE: 80 MMHG | BODY MASS INDEX: 31.84 KG/M2 | HEIGHT: 69 IN | WEIGHT: 215 LBS | HEART RATE: 78 BPM | SYSTOLIC BLOOD PRESSURE: 124 MMHG

## 2022-11-09 DIAGNOSIS — K21.9 GASTROESOPHAGEAL REFLUX DISEASE WITHOUT ESOPHAGITIS: ICD-10-CM

## 2022-11-09 DIAGNOSIS — K58.9 IRRITABLE BOWEL SYNDROME WITHOUT DIARRHEA: Primary | ICD-10-CM

## 2022-11-09 PROCEDURE — 99213 OFFICE O/P EST LOW 20 MIN: CPT | Performed by: INTERNAL MEDICINE

## 2022-11-09 RX ORDER — DICYCLOMINE HYDROCHLORIDE 10 MG/1
10 CAPSULE ORAL 4 TIMES DAILY
Qty: 120 CAPSULE | Refills: 3 | Status: SHIPPED | OUTPATIENT
Start: 2022-11-09

## 2022-11-09 RX ORDER — OMEPRAZOLE 20 MG/1
20 TABLET, DELAYED RELEASE ORAL DAILY
Qty: 30 TABLET | Refills: 3 | Status: SHIPPED | OUTPATIENT
Start: 2022-11-09

## 2022-11-09 NOTE — PROGRESS NOTES
GI FOLLOW UP    INTERVAL HISTORY:       Irregular bowel habits appear to be continue  Colonoscopy was negative, repeat colonoscopy in 5 years due to first-degree relative with [facility]      Chief Complaint   Patient presents with    Follow-up     Constipation, is better when taking medication and eating better       1. Irritable bowel syndrome without diarrhea    2. Gastroesophageal reflux disease without esophagitis          HISTORY OF PRESENT ILLNESS: Mr.Hector Fady Owens is a 42 y.o. male with a past history remarkable for family history of colorectal cancer, father was diagnosed with colorectal cancer several years ago, previous colonoscopy performed in 2009 which failed to reveal any evidence of precancerous lesions or polyps, referred for evaluation of surveillance colonoscopy. Patient denies any prior left rectum, hematochezia, no melena. Denies any changes in appetite or weight changes. Non-smoker nondrinker. Smoker: None   Drinking history: Social  Illicit drugs: None   Abdominal surgeries: None   Prior Colonoscopy: 2009-- No polyps. [facility]--   Prior EGD: 2009--   FH of GI issues: Father with CRC. Past Medical,Family, and Social History reviewed and does contribute to the patient presenting condition. Patient's PMH/PSH,SH,PSYCH Hx, MEDs, ALLERGIES, and ROS were all reviewed and updated in the appropriate sections.     PAST MEDICAL HISTORY:  Past Medical History:   Diagnosis Date    GERD (gastroesophageal reflux disease)     Irritable bowel syndrome        Past Surgical History:   Procedure Laterality Date    COLONOSCOPY      COLONOSCOPY N/A 5/10/2022    COLORECTAL CANCER SCREENING, NOT HIGH RISK performed by Monse Quick MD at [address] [facility], DIAGNOSTIC      UPPER GASTROINTESTINAL ENDOSCOPY  08/10/2009    UPPER GASTROINTESTINAL ENDOSCOPY N/A 5/10/2022    EGD BIOPSY performed by Johnny Cruz MD at [id number]:    Current Outpatient Medications:     dicyclomine (BENTYL) 10 MG capsule, Take 1 capsule by mouth 4 times daily, Disp: 120 capsule, Rfl: 3    omeprazole (PRILOSEC OTC) 20 MG tablet, Take 1 tablet by mouth daily, Disp: 30 tablet, Rfl: 3    naproxen (NAPROSYN) 500 MG tablet, Take 1 tablet by mouth 2 times daily (with meals), Disp: 180 tablet, Rfl: 1    hydrocortisone 2.5 % ointment, Apply topically 2 times daily. , Disp: 200 g, Rfl: 1    melatonin 1 MG tablet, Take 1 tablet by mouth nightly, Disp: 180 tablet, Rfl: 1    Cholecalciferol (VITAMIN D3) 25 MCG (1000 UT) TABS, TAKE 1 TABLET BY MOUTH DAILY, Disp: 90 tablet, Rfl: 3    vitamin B-12 (CYANOCOBALAMIN) 250 MCG tablet, Take 1 tablet by mouth daily, Disp: 90 tablet, Rfl: 1    tiZANidine (ZANAFLEX) 4 MG tablet, TAKE 1 TABLET BY MOUTH EVERY NIGHT AS NEEDED FOR STIFFNESS, Disp: 30 tablet, Rfl: 1    pitavastatin (LIVALO) 1 MG TABS tablet, Take 1 tablet by mouth nightly, Disp: 90 tablet, Rfl: 1    triamcinolone (KENALOG) 0.1 % cream, Apply topically 2 times daily, until rash gone, Disp: 30 g, Rfl: 0    ALLERGIES:   No Known Allergies    FAMILY HISTORY:       Problem Relation Age of Onset    Hypertension Mother     Diabetes Father     Cancer Father         colon  death age 71         SOCIAL HISTORY:   Social History     Socioeconomic History    Marital status:      Spouse name: Not on file    Number of children: Not on file    Years of education: Not on file    Highest education level: Not on file   Occupational History    Not on file   Tobacco Use    Smoking status: Never    Smokeless tobacco: Never   Vaping Use    Vaping Use: Never used   Substance and Sexual Activity    Alcohol use:  Yes     Alcohol/week: 0.0 standard drinks     Comment: occasionally    Drug use: No    Sexual activity: Yes   Other Topics Concern    Not on file   Social History Narrative    Not on file     Social Determinants of Health     Financial Resource Strain: Low Risk     Difficulty of Paying Living Expenses: Not very hard   Food Insecurity: No Food Insecurity    Worried About Running Out of Food in the Last Year: Never true    Ran Out of Food in the Last Year: Never true   Transportation Needs: Not on file   Physical Activity: Not on file   Stress: Not on file   Social Connections: Not on file   Intimate Partner Violence: Not on file   Housing Stability: Not on file       REVIEW OF SYSTEMS: A 12-point review of systems was obtained and pertinent positives and negatives were listed below. REVIEW OF SYSTEMS:     Constitutional: No fever, no chills, no lethargy, no weakness. HEENT:  No headache, otalgia, itchy eyes, nasal discharge or sore throat. Cardiac:  No chest pain, dyspnea, orthopnea or PND. Chest:   No cough, phlegm or wheezing. Abdomen:      Detailed by MA   Neuro:  No focal weakness, abnormal movements or seizure like activity. Skin:   No rashes, no itching. :   No hematuria, no pyuria, no dysuria, no flank pain. Extremities:  No swelling or joint pains. ROS was otherwise negative    Review of Systems    PHYSICAL EXAMINATION: Vital signs reviewed per the nursing documentation. /80 (Site: Left Upper Arm, Position: Sitting, Cuff Size: Large Adult)   Pulse 78   Ht 5' 9\" (1.753 m)   Wt 215 lb (97.5 kg)   BMI 31.75 kg/m²   Body mass index is 31.75 kg/m². Physical Exam    Physical Exam   Constitutional: Patient is oriented to person, place, and time. Patient appears well-developed and well-nourished. HENT:   Head: Normocephalic and atraumatic. Eyes: Pupils are equal, round, and reactive to light. EOM are normal.   Neck: Normal range of motion. Neck supple. No JVD present. No tracheal deviation present. No thyromegaly present. Cardiovascular: Normal rate, regular rhythm, normal heart sounds and intact distal pulses.    Pulmonary/Chest: Effort normal and breath sounds normal. No stridor. No respiratory distress. He has no wheezes. He has no rales. He exhibits no tenderness. Abdominal: Soft. Bowel sounds are normal. He exhibits no distension and no mass. There is no tenderness. There is no rebound and no guarding. No hernia. Musculoskeletal: Normal range of motion. Lymphadenopathy:    Patient has no cervical adenopathy. Neurological: Patient is alert and oriented to person, place, and time. Psychiatric: Patient has a normal mood and affect. Patient behavior is normal.       LABORATORY DATA: Reviewed  Lab Results   Component Value Date    WBC 6.6 04/14/2022    HGB 15.5 04/14/2022    HCT 45.4 04/14/2022    MCV 88.5 04/14/2022     04/14/2022     04/14/2022    K 4.7 04/14/2022     04/14/2022    CO2 27 04/14/2022    BUN 15 04/14/2022    CREATININE 0.77 04/14/2022    LABALBU 4.3 04/14/2022    BILITOT 0.56 04/14/2022    ALKPHOS 77 04/14/2022    AST 26 04/14/2022    ALT 35 04/14/2022         Lab Results   Component Value Date    RBC 5.13 04/14/2022    HGB 15.5 04/14/2022    MCV 88.5 04/14/2022    MCH 30.3 04/14/2022    MCHC 34.2 04/14/2022    RDW 13.2 04/14/2022    MPV 7.8 04/14/2022    BASOPCT 1 02/09/2021    LYMPHSABS 2.10 02/09/2021    MONOSABS 0.60 02/09/2021    NEUTROABS 4.00 02/09/2021    EOSABS 0.20 02/09/2021    BASOSABS 0.10 02/09/2021         DIAGNOSTIC TESTING:     No results found. IMPRESSION: Mr.Hector America Hernandez is a 42 y.o. male with a past history remarkable for family history of colorectal cancer, father was diagnosed with colorectal cancer several years ago, previous colonoscopy performed in 2009 which failed to reveal any evidence of precancerous lesions or polyps, referred for evaluation of surveillance colonoscopy. Patient denies any prior left rectum, hematochezia, no melena. Denies any changes in appetite or weight changes. Non-smoker nondrinker. Assessment  1. Irritable bowel syndrome without diarrhea    2.  Gastroesophageal reflux disease without esophagitis        Santiago Foster was seen today for follow-up. Diagnoses and all orders for this visit:    Irritable bowel syndrome without diarrhea-patient appears to have fluctuating symptoms, constipation and loose bowels, will provide Bentyl as needed. Advised to maintain dietary log if the patient is able to. He reports that his symptoms may be exacerbated by ingestion of ground beef. Gastroesophageal reflux disease without esophagitis-patient continue with Prilosec 20 mg daily. Other orders  -     dicyclomine (BENTYL) 10 MG capsule; Take 1 capsule by mouth 4 times daily  -     omeprazole (PRILOSEC OTC) 20 MG tablet; Take 1 tablet by mouth daily     Referral to colorectal surgery provided for hemorrhoidal symptoms if present      RTC: 3 months. Additional comments: Thank you for allowing me to participate in the care of Mr. Kam Thomas. For any further questions please do not hesitate to contact me. I have reviewed and agree with the ROS entered by the MA/LPN from today's encounter documented in a separate note. Adelfo Peterson MD, MPH   Board Certified in Gastroenterology  Board Certified in [address] [place] [address] #: [phone]    this note is created with the assistance of a speech recognition program.  While intending to generate a document that actually reflects the content of the visit, the document can still have some errors including those of syntax and sound a like substitutions which may escape proof reading. It such instances, actual meaning can be extrapolated by contextual diversion.

## 2022-11-11 ENCOUNTER — OFFICE VISIT (OUTPATIENT)
Dept: FAMILY MEDICINE CLINIC | Age: 42
End: 2022-11-11
Payer: COMMERCIAL

## 2022-11-11 VITALS
SYSTOLIC BLOOD PRESSURE: 100 MMHG | BODY MASS INDEX: 32.26 KG/M2 | OXYGEN SATURATION: 98 % | TEMPERATURE: 97.5 F | HEIGHT: 69 IN | WEIGHT: 217.8 LBS | HEART RATE: 88 BPM | DIASTOLIC BLOOD PRESSURE: 74 MMHG

## 2022-11-11 DIAGNOSIS — M54.2 CERVICAL PAIN: ICD-10-CM

## 2022-11-11 DIAGNOSIS — G47.26 SHIFT WORK SLEEP DISORDER: ICD-10-CM

## 2022-11-11 DIAGNOSIS — E78.2 MIXED HYPERLIPIDEMIA: ICD-10-CM

## 2022-11-11 DIAGNOSIS — E53.8 VITAMIN B 12 DEFICIENCY: ICD-10-CM

## 2022-11-11 DIAGNOSIS — E66.09 CLASS 1 OBESITY DUE TO EXCESS CALORIES WITH SERIOUS COMORBIDITY AND BODY MASS INDEX (BMI) OF 32.0 TO 32.9 IN ADULT: ICD-10-CM

## 2022-11-11 DIAGNOSIS — M54.42 CHRONIC MIDLINE LOW BACK PAIN WITH LEFT-SIDED SCIATICA: Primary | ICD-10-CM

## 2022-11-11 DIAGNOSIS — G89.29 CHRONIC MIDLINE LOW BACK PAIN WITH LEFT-SIDED SCIATICA: Primary | ICD-10-CM

## 2022-11-11 PROCEDURE — 99214 OFFICE O/P EST MOD 30 MIN: CPT | Performed by: FAMILY MEDICINE

## 2022-11-11 RX ORDER — TIZANIDINE 4 MG/1
TABLET ORAL
Qty: 30 TABLET | Refills: 1 | Status: SHIPPED | OUTPATIENT
Start: 2022-11-11

## 2022-11-11 NOTE — PATIENT INSTRUCTIONS
New Updates for [id number]/ [facility] ([facility]) CRISTIAN    Thank you for choosing US to give you the best care! [employer] ([facility]) is always trying to think of new ways to help their patients. We are asking all patients to try out the new digital registration that is now available through your [facility] account or the new CRISTIAN, [employer] ([facility]). Via the cristian you're now able to update your personal and registration information prior to your upcoming appointment. This will save you time once you arrive at the office to check-in, not to mention your information remains safe!! Many other perks come from signing up for an account, such as:  Requesting refills  Scheduling an appointment  Completing an E-Visit  Sending a message to the office/provider  Having access to your medication list  Paying your bill/copay prior to your appointment  Scheduling your yearly mammogram  Review your test results    If you are not familiar with [facility] or the [facility] ([facility]) CRISTIAN, please ask one of us and we will be happy to answer any questions or help you set-up your account.       Your [address] [address] office,  [place]indy

## 2022-11-11 NOTE — PROGRESS NOTES
Visit Information    Have you changed or started any medications since your last visit including any over-the-counter medicines, vitamins, or herbal medicines? no   Have you stopped taking any of your medications? Is so, why? -  no  Are you having any side effects from any of your medications? - no    Have you seen any other physician or provider since your last visit?  no   Have you had any other diagnostic tests since your last visit?  no   Have you been seen in the emergency room and/or had an admission in a hospital since we last saw you?  no   Have you had your routine dental cleaning in the past 6 months?  no     Do you have an active MyChart account? If no, what is the barrier?   Yes    Patient Care Team:  PEPE Malagon CNP as PCP - General (Family Medicine)  PEPE Malagon [facility]  Adelfo Peterson MD as Consulting Physician (Gastroenterology)    Medical History Review  Past Medical, Family, and Social History reviewed and does contribute to the patient presenting condition    Health Maintenance   Topic Date Due    COVID-19 Vaccine (1) Never done    Flu vaccine (1) 08/01/2022    Depression Screen  04/12/2023    Lipids  04/14/2023    DTaP/Tdap/Td vaccine (3 - Td or Tdap) 12/28/2030    Hepatitis C screen  Completed    HIV screen  Completed    Hepatitis A vaccine  Aged Out    Hib vaccine  Aged Out    Meningococcal (ACWY) vaccine  Aged Out    Pneumococcal 0-64 years Vaccine  Aged Out

## 2022-11-12 ENCOUNTER — HOSPITAL ENCOUNTER (OUTPATIENT)
Dept: GENERAL RADIOLOGY | Age: 42
Discharge: HOME | End: 2022-11-14
Payer: COMMERCIAL

## 2022-11-12 ENCOUNTER — HOSPITAL ENCOUNTER (OUTPATIENT)
Age: 42
Discharge: HOME | End: 2022-11-14
Payer: COMMERCIAL

## 2022-11-12 DIAGNOSIS — M54.2 CERVICAL PAIN: ICD-10-CM

## 2022-11-12 PROCEDURE — 72040 X-RAY EXAM NECK SPINE 2-3 VW: CPT

## 2022-12-03 ENCOUNTER — HOSPITAL ENCOUNTER (OUTPATIENT)
Dept: MRI IMAGING | Age: 42
End: 2022-12-03
Payer: COMMERCIAL

## 2022-12-03 ENCOUNTER — HOSPITAL ENCOUNTER (OUTPATIENT)
Dept: GENERAL RADIOLOGY | Age: 42
End: 2022-12-03
Payer: COMMERCIAL

## 2022-12-03 DIAGNOSIS — T15.90XA FOREIGN BODY IN EYE, UNSPECIFIED LATERALITY, INITIAL ENCOUNTER: ICD-10-CM

## 2022-12-03 DIAGNOSIS — G89.29 CHRONIC MIDLINE LOW BACK PAIN WITH LEFT-SIDED SCIATICA: ICD-10-CM

## 2022-12-03 DIAGNOSIS — M54.42 CHRONIC MIDLINE LOW BACK PAIN WITH LEFT-SIDED SCIATICA: ICD-10-CM

## 2022-12-03 PROCEDURE — 70030 X-RAY EYE FOR FOREIGN BODY: CPT

## 2022-12-03 PROCEDURE — 72148 MRI LUMBAR SPINE W/O DYE: CPT

## 2022-12-04 DIAGNOSIS — M51.16 LUMBAR DISC HERNIATION WITH RADICULOPATHY: Primary | ICD-10-CM

## 2022-12-04 NOTE — RESULT ENCOUNTER NOTE
Please let him know that he has stable lumbar disc herniation causing some of his symptoms. I sent a referral to the neuro surgeon for further evaluation.

## 2022-12-12 ENCOUNTER — HOSPITAL ENCOUNTER (OUTPATIENT)
Age: 42
Discharge: HOME | End: 2022-12-14
Payer: COMMERCIAL

## 2022-12-12 ENCOUNTER — HOSPITAL ENCOUNTER (OUTPATIENT)
Dept: GENERAL RADIOLOGY | Age: 42
Discharge: HOME | End: 2022-12-14
Payer: COMMERCIAL

## 2022-12-12 ENCOUNTER — OFFICE VISIT (OUTPATIENT)
Dept: NEUROSURGERY | Age: 42
End: 2022-12-12
Payer: COMMERCIAL

## 2022-12-12 VITALS
SYSTOLIC BLOOD PRESSURE: 136 MMHG | BODY MASS INDEX: 32.99 KG/M2 | HEART RATE: 80 BPM | RESPIRATION RATE: 22 BRPM | WEIGHT: 217 LBS | DIASTOLIC BLOOD PRESSURE: 83 MMHG | TEMPERATURE: 98.2 F

## 2022-12-12 DIAGNOSIS — M51.16 LUMBAR DISC HERNIATION WITH RADICULOPATHY: Primary | ICD-10-CM

## 2022-12-12 DIAGNOSIS — M51.16 LUMBAR DISC HERNIATION WITH RADICULOPATHY: ICD-10-CM

## 2022-12-12 PROCEDURE — 72120 X-RAY BEND ONLY L-S SPINE: CPT

## 2022-12-12 PROCEDURE — 99204 OFFICE O/P NEW MOD 45 MIN: CPT | Performed by: NEUROLOGICAL SURGERY

## 2022-12-12 RX ORDER — PREDNISONE 20 MG/1
TABLET ORAL
Qty: 30 TABLET | Refills: 0 | Status: SHIPPED | OUTPATIENT
Start: 2022-12-12 | End: 2022-12-27

## 2022-12-12 NOTE — PROGRESS NOTES
[address]  [facility] [address], [address] [facility] [address]  Dept: [phone]    Patient:  Cinda Alston  YOB: 1980  Date: 12/12/22    The patient is a 36 y.o. male who presents today for consult of the following problems:     Chief Complaint   Patient presents with    New Patient             HPI:     Cinda Alston is a 36 y.o. male on whom neurosurgical consultation was requested by PEPE Lance CNP for management of lumbar spondylosis or disc radiation with radiculopathy. Patient has had ongoing symptoms for approximately 7 months along with left-sided paraspinal pain rating down the left lower extremity along the lateral aspect of the upper leg to the lateral aspect of the lower leg and dorsum of the foot with some numbness diffusely of the left foot with nondermatomal distribution. Denies the right leg symptoms. Patient has had symptoms on and off throughout the course of his life with exacerbation in January last year for which she went to an urgent care was prescribed some medication and did improve spontaneously but it was localized only to his back region at that time. Pain is worse with sitting as well as with nocturnal awakening due to severe discomfort we has to get out of bed and walk around in order to improve some of the symptoms. These episodes occur approximate 2-3 times per week. Denies any right leg symptoms saddle anesthesia bowel bladder incontinence. Did complete a Lasix recent physical therapy but has not had any interlaminar injection and has had 1 steroid course oral.  Is currently on intermittent muscle relaxants as well with some relief. Does use intermittent creams as well as heat with no long-lasting relief. .      History:     Past Medical History:   Diagnosis Date    GERD (gastroesophageal reflux disease)     Irritable bowel syndrome      Past Surgical History:   Procedure Laterality Date    COLONOSCOPY      COLONOSCOPY N/A 5/10/2022    COLORECTAL CANCER SCREENING, NOT HIGH RISK performed by Hong Mak MD at [address] [facility], COLON, DIAGNOSTIC      UPPER GASTROINTESTINAL ENDOSCOPY  08/10/2009    UPPER GASTROINTESTINAL ENDOSCOPY N/A 5/10/2022    EGD BIOPSY performed by Hong Mak MD at [address] [facility].     Family History   Problem Relation Age of Onset    Hypertension Mother     Diabetes Father     Cancer Father         colon  death age 71     Current Outpatient Medications on File Prior to Visit   Medication Sig Dispense Refill    vitamin B-12 (CYANOCOBALAMIN) 250 MCG tablet Take 1 tablet by mouth daily 90 tablet 1    tiZANidine (ZANAFLEX) 4 MG tablet TAKE 1 TABLET BY MOUTH EVERY NIGHT AS NEEDED FOR STIFFNESS 30 tablet 1    dicyclomine (BENTYL) 10 MG capsule Take 1 capsule by mouth 4 times daily 120 capsule 3    omeprazole (PRILOSEC OTC) 20 MG tablet Take 1 tablet by mouth daily 30 tablet 3    naproxen (NAPROSYN) 500 MG tablet Take 1 tablet by mouth 2 times daily (with meals) 180 tablet 1    pitavastatin (LIVALO) 1 MG TABS tablet Take 1 tablet by mouth nightly 90 tablet 1    hydrocortisone 2.5 % ointment Apply topically 2 times daily. 200 g 1    triamcinolone (KENALOG) 0.1 % cream Apply topically 2 times daily, until rash gone 30 g 0    melatonin 1 MG tablet Take 1 tablet by mouth nightly 180 tablet 1    Cholecalciferol (VITAMIN D3) 25 MCG (1000 UT) TABS TAKE 1 TABLET BY MOUTH DAILY 90 tablet 3     No current facility-administered medications on file prior to visit. Social History     Tobacco Use    Smoking status: Never    Smokeless tobacco: Never   Vaping Use    Vaping Use: Never used   Substance Use Topics    Alcohol use: Yes     Alcohol/week: 0.0 standard drinks     Comment: occasionally    Drug use: No       No Known Allergies    Review of Systems  ROS: Back pain. Leg pain. Numbness.     Physical Exam: /83 (Site: Left Upper Arm, Position: Sitting, Cuff Size: Medium Adult)   Pulse 80   Temp 98.2 °F (36.8 °C)   Resp 22   Wt 217 lb (98.4 kg)   BMI 32.99 kg/m²   Estimated body mass index is 32.99 kg/m² as calculated from the following:    Height as of 12/3/22: 5' 8\" (1.727 m). Weight as of this encounter: 217 lb (98.4 kg). General:  Heidy Kuo is a 36y.o. year old male who appears his stated age. HEENT: Normocephalic atraumatic. Neck supple. Chest: regular rate; pulses equal. Equal chest rise and fall  Abdomen: Soft nondistended. Ext: DP equal with good capillary refill  Neuro    Mentation  Appropriate affect   oriented    Cranial Nerves:   Pupils equal and reactive to light  Extraocular motion intact  Face symmetric  No dysarthria  v1-3 sensation symmetric, masseter tone symmetric  Hearing symmetric and intact to finger rub    Sensation:   intat    Motor  L deltoid 5/5; R deltoid 5/5  L biceps 5/5; R biceps 5/5  L triceps 5/5; R triceps 5/5  L wrist extension 5/5; R wrist extension 5/5  L intrinsics 5/5; R intrinsics 5/5     L iliopsoas 5/5 , R iliopsoas 5/5  L quadriceps 5/5; R quadriceps 5/5  L Dorsiflexion 5/5; R dorsiflexion 5/5  L Plantarflexion 5/5; R plantarflexion 5/5  L EHL 5/5; R EHL 5/5    Reflexes  L Brachioradialis 2+/4; R brachioradialis 2+/4  L Biceps 2+/4; R Biceps 2+/4  L Triceps 2+/4; R Triceps 2+/4  L Patellar 2+/4: R Patellar 2+/4  L Achilles 2+/4; R Achilles 2+/4    hoffmans L: neg  hoffmans R: neg  Clonus L: neg  Clonus R: neg  Babinski L: up  Babinski R; up    Studies Review:     Mri L L4-5 left lateral recess broad-based disc extrusion and protrusion affecting the traversing nerve root. Assessment and Plan:      1. Lumbar disc herniation with radiculopathy          Plan:   Patient fortunately failed conservative measures including physical therapy and oral steroids still with persistent left-sided L4-L5 radiculopathy.   At this point concerning her broad-based protrusion which would likely require a fusion in order to treat would recommend continuation of conservative arm including interlaminar epidural injection at L4-5 in conjunction with oral steroid regimen and flexion-extension x-rays to rule out any gross instability. Xray f/e  Pred taper  Pain mgmt for JEAN MARIE  Continue PT    Followup: No follow-ups on file. Prescriptions Ordered:  Orders Placed This Encounter   Medications    predniSONE (DELTASONE) 20 MG tablet     Sig: Take 3 tablets PO daily for first 5 days, then take 2 tablets PO daily for next 5 days, then take 1 tablet PO daily for last 5 days     Dispense:  30 tablet     Refill:  0        Orders Placed:  Orders Placed This Encounter   Procedures    XR LUMBAR SPINE FLEXION AND EXTENSION ONLY     Standing lateral Flexion, Neutral, extension  Include both femoral heads on neutral imaging     Standing Status:   Future     Standing Expiration Date:   12/12/2023     Order Specific Question:   Reason for exam:     Answer:   evaluate for instability    Bushra Cavazos MD, Pain Management, [place]     Referral Priority:   Routine     Referral Type:   Eval and Treat     Referral Reason:   Specialty Services Required     Referred to Provider:   Jaymie Maxwell MD     Requested Specialty:   Pain Management     Number of Visits Requested:   1        Electronically signed by Nohemy Bass DO on 12/12/2022 at 2:04 PM    Please note that this chart was generated using voice recognition Dragon dictation software. Although every effort was made to ensure the accuracy of this automated transcription, some errors in transcription may have occurred.

## 2023-01-16 ENCOUNTER — INITIAL CONSULT (OUTPATIENT)
Dept: PAIN MANAGEMENT | Age: 43
End: 2023-01-16
Payer: COMMERCIAL

## 2023-01-16 VITALS — WEIGHT: 217 LBS | BODY MASS INDEX: 32.89 KG/M2 | HEIGHT: 68 IN

## 2023-01-16 DIAGNOSIS — M54.17 LUMBOSACRAL NEURITIS: Primary | ICD-10-CM

## 2023-01-16 DIAGNOSIS — M51.26 DISPLACEMENT OF LUMBAR INTERVERTEBRAL DISC WITHOUT MYELOPATHY: ICD-10-CM

## 2023-01-16 PROCEDURE — 99204 OFFICE O/P NEW MOD 45 MIN: CPT | Performed by: PAIN MEDICINE

## 2023-01-16 ASSESSMENT — ENCOUNTER SYMPTOMS
BOWEL INCONTINENCE: 0
BACK PAIN: 1

## 2023-01-16 NOTE — PROGRESS NOTES
HPI:     Back Pain  This is a chronic problem. The current episode started more than 1 month ago. The problem occurs intermittently. The problem is unchanged. The pain is present in the lumbar spine. The quality of the pain is described as burning. The pain radiates to the left knee and left thigh. The pain is at a severity of 4/10. The pain is moderate. The pain is Worse during the night. The symptoms are aggravated by sitting and position. Associated symptoms include numbness. Pertinent negatives include no bladder incontinence, bowel incontinence, tingling or weakness. He has tried walking, ice, heat, bed rest and home exercises for the symptoms. The treatment provided mild relief. Pain in the low back down the left leg. MRI with L4-5 disc herniation. He has seen a surgeon. Discussed more conservative measures. Physical therapy with some benefit but does continue to have lingering pain    Patient denies any new neurological symptoms. No bowel or bladder incontinence, no weakness, and no falling. Review of OARRS does not show any aberrant prescription behavior.      Past Medical History:   Diagnosis Date    GERD (gastroesophageal reflux disease)     Irritable bowel syndrome        Past Surgical History:   Procedure Laterality Date    COLONOSCOPY      COLONOSCOPY N/A 5/10/2022    COLORECTAL CANCER SCREENING, NOT HIGH RISK performed by Corry Wiggins MD at [address] [facility], DIAGNOSTIC      UPPER GASTROINTESTINAL ENDOSCOPY  08/10/2009    UPPER GASTROINTESTINAL ENDOSCOPY N/A 5/10/2022    EGD BIOPSY performed by Corry Wiggins MD at [address] [facility].       No Known Allergies      Current Outpatient Medications:     vitamin B-12 (CYANOCOBALAMIN) 250 MCG tablet, Take 1 tablet by mouth daily, Disp: 90 tablet, Rfl: 1    tiZANidine (ZANAFLEX) 4 MG tablet, TAKE 1 TABLET BY MOUTH EVERY NIGHT AS NEEDED FOR STIFFNESS, Disp: 30 tablet, Rfl: 1    dicyclomine (BENTYL) 10 MG capsule, Take 1 capsule by mouth 4 times daily, Disp: 120 capsule, Rfl: 3    omeprazole (PRILOSEC OTC) 20 MG tablet, Take 1 tablet by mouth daily, Disp: 30 tablet, Rfl: 3    naproxen (NAPROSYN) 500 MG tablet, Take 1 tablet by mouth 2 times daily (with meals), Disp: 180 tablet, Rfl: 1    pitavastatin (LIVALO) 1 MG TABS tablet, Take 1 tablet by mouth nightly, Disp: 90 tablet, Rfl: 1    hydrocortisone 2.5 % ointment, Apply topically 2 times daily. , Disp: 200 g, Rfl: 1    triamcinolone (KENALOG) 0.1 % cream, Apply topically 2 times daily, until rash gone, Disp: 30 g, Rfl: 0    melatonin 1 MG tablet, Take 1 tablet by mouth nightly, Disp: 180 tablet, Rfl: 1    Cholecalciferol (VITAMIN D3) 25 MCG (1000 UT) TABS, TAKE 1 TABLET BY MOUTH DAILY, Disp: 90 tablet, Rfl: 3    Family History   Problem Relation Age of Onset    Hypertension Mother     Diabetes Father     Cancer Father         colon  death age 71       Social History     Socioeconomic History    Marital status:      Spouse name: Not on file    Number of children: Not on file    Years of education: Not on file    Highest education level: Not on file   Occupational History    Not on file   Tobacco Use    Smoking status: Never    Smokeless tobacco: Never   Vaping Use    Vaping Use: Never used   Substance and Sexual Activity    Alcohol use:  Yes     Alcohol/week: 0.0 standard drinks     Comment: occasionally    Drug use: No    Sexual activity: Yes   Other Topics Concern    Not on file   Social History Narrative    Not on file     Social Determinants of Health     Financial Resource Strain: Low Risk     Difficulty of Paying Living Expenses: Not very hard   Food Insecurity: No Food Insecurity    Worried About Running Out of Food in the Last Year: Never true    Ran Out of Food in the Last Year: Never true   Transportation Needs: Not on file   Physical Activity: Not on file   Stress: Not on file   Social Connections: Not on file   Intimate Partner Violence: Not on file   Housing Stability: Not on file       Review of Systems:  Review of Systems   Musculoskeletal:  Positive for back pain. Gastrointestinal:  Negative for bowel incontinence. Genitourinary:  Negative for bladder incontinence. Neurological:  Positive for numbness. Negative for tingling and weakness. Physical Exam:  Ht 5' 8\" (1.727 m)   Wt 217 lb (98.4 kg)   BMI 32.99 kg/m²     Physical Exam  Constitutional:       Appearance: Normal appearance. Pulmonary:      Effort: Pulmonary effort is normal.   Neurological:      Mental Status: He is alert. Psychiatric:         Attention and Perception: Attention and perception normal.         Mood and Affect: Mood and affect normal.       Record/Diagnostics Review:    As above, I did independently review the imaging      Assessment:  1. Lumbosacral neuritis    2. Displacement of lumbar intervertebral disc without myelopathy        Treatment Plan:  DISCUSSION: Treatment options discussed with patient and all questions answered to patient's satisfaction. OARRS Review: Reviewed and acceptable for medications prescribed. TREATMENT OPTIONS:     Discussed different treatment options including continued conservative care such as physical therapy, chiropractic care, acupuncture. Discussed different interventional options such as epidural steroids or medial branch blocks - declines at this time. Also discussed surgical evaluation. At this point wishes to continue with physical therapy. Consider epidural steroid injection in the future      Radha Noble M.D. I have reviewed the chief complaint and history of present illness (including ROS and PFSH) and vital documentation by my staff and I agree with their documentation and have added where applicable.

## 2023-02-27 ENCOUNTER — OFFICE VISIT (OUTPATIENT)
Dept: PAIN MANAGEMENT | Age: 43
End: 2023-02-27
Payer: COMMERCIAL

## 2023-02-27 VITALS — BODY MASS INDEX: 32.89 KG/M2 | HEIGHT: 68 IN | WEIGHT: 217 LBS

## 2023-02-27 DIAGNOSIS — G89.29 CHRONIC BILATERAL THORACIC BACK PAIN: ICD-10-CM

## 2023-02-27 DIAGNOSIS — M25.552 LEFT HIP PAIN: ICD-10-CM

## 2023-02-27 DIAGNOSIS — M51.26 DISPLACEMENT OF LUMBAR INTERVERTEBRAL DISC WITHOUT MYELOPATHY: ICD-10-CM

## 2023-02-27 DIAGNOSIS — M47.817 LUMBOSACRAL SPONDYLOSIS WITHOUT MYELOPATHY: Primary | ICD-10-CM

## 2023-02-27 DIAGNOSIS — M54.6 CHRONIC BILATERAL THORACIC BACK PAIN: ICD-10-CM

## 2023-02-27 DIAGNOSIS — M54.17 LUMBOSACRAL NEURITIS: ICD-10-CM

## 2023-02-27 PROCEDURE — 62323 NJX INTERLAMINAR LMBR/SAC: CPT | Performed by: PAIN MEDICINE

## 2023-02-27 PROCEDURE — 99214 OFFICE O/P EST MOD 30 MIN: CPT | Performed by: PAIN MEDICINE

## 2023-02-27 ASSESSMENT — PATIENT HEALTH QUESTIONNAIRE - PHQ9
SUM OF ALL RESPONSES TO PHQ QUESTIONS 1-9: 0
SUM OF ALL RESPONSES TO PHQ QUESTIONS 1-9: 0
SUM OF ALL RESPONSES TO PHQ9 QUESTIONS 1 & 2: 0
SUM OF ALL RESPONSES TO PHQ QUESTIONS 1-9: 0
1. LITTLE INTEREST OR PLEASURE IN DOING THINGS: 0
2. FEELING DOWN, DEPRESSED OR HOPELESS: 0
SUM OF ALL RESPONSES TO PHQ QUESTIONS 1-9: 0

## 2023-02-27 ASSESSMENT — ENCOUNTER SYMPTOMS
BOWEL INCONTINENCE: 0
BACK PAIN: 1

## 2023-02-27 NOTE — PROGRESS NOTES
HPI:     Back Pain  This is a chronic problem. The current episode started more than 1 year ago. The problem occurs intermittently. The problem is unchanged. The pain is present in the lumbar spine. The quality of the pain is described as burning. The pain radiates to the left knee and left thigh. The pain is at a severity of 4/10. The pain is moderate. The pain is Worse during the night. The symptoms are aggravated by position and sitting. Pertinent negatives include no bladder incontinence or bowel incontinence. He has tried bed rest, home exercises, heat, ice, walking and muscle relaxant for the symptoms. The treatment provided mild relief. MRI lumbar spine with L4-5 disc herniation. He has seen a surgeon. Nothing surgical planned. He has been doing physical therapy/HEP with minimal benefit. Complaining of some occasional pain radiating into the left groin as well. Has also been complaining of some cervical/thoracic pain. Of the cervical spine with no significant degenerative changes. Patient denies any new neurological symptoms. Nobowel or bladder incontinence, no weakness, and no falling. Review of OARRS does not show any aberrant prescription behavior.      Past Medical History:   Diagnosis Date    GERD (gastroesophageal reflux disease)     Irritable bowel syndrome        Past Surgical History:   Procedure Laterality Date    COLONOSCOPY      COLONOSCOPY N/A 5/10/2022    COLORECTAL CANCER SCREENING, NOT HIGH RISK performed by Custer Prader, MD at [address] [facility], DIAGNOSTIC      UPPER GASTROINTESTINAL ENDOSCOPY  08/10/2009    UPPER GASTROINTESTINAL ENDOSCOPY N/A 5/10/2022    EGD BIOPSY performed by Custer Prader, MD at [address] [facility].       No Known Allergies      Current Outpatient Medications:     vitamin B-12 (CYANOCOBALAMIN) 250 MCG tablet, Take 1 tablet by mouth daily, Disp: 90 tablet, Rfl: 1    tiZANidine (ZANAFLEX) 4 MG tablet, TAKE 1 TABLET BY MOUTH EVERY NIGHT AS NEEDED FOR STIFFNESS, Disp: 30 tablet, Rfl: 1    dicyclomine (BENTYL) 10 MG capsule, Take 1 capsule by mouth 4 times daily, Disp: 120 capsule, Rfl: 3    omeprazole (PRILOSEC OTC) 20 MG tablet, Take 1 tablet by mouth daily, Disp: 30 tablet, Rfl: 3    naproxen (NAPROSYN) 500 MG tablet, Take 1 tablet by mouth 2 times daily (with meals), Disp: 180 tablet, Rfl: 1    pitavastatin (LIVALO) 1 MG TABS tablet, Take 1 tablet by mouth nightly, Disp: 90 tablet, Rfl: 1    hydrocortisone 2.5 % ointment, Apply topically 2 times daily. , Disp: 200 g, Rfl: 1    triamcinolone (KENALOG) 0.1 % cream, Apply topically 2 times daily, until rash gone, Disp: 30 g, Rfl: 0    melatonin 1 MG tablet, Take 1 tablet by mouth nightly, Disp: 180 tablet, Rfl: 1    Cholecalciferol (VITAMIN D3) 25 MCG (1000 UT) TABS, TAKE 1 TABLET BY MOUTH DAILY, Disp: 90 tablet, Rfl: 3    Family History   Problem Relation Age of Onset    Hypertension Mother     Diabetes Father     Cancer Father         colon  death age 71       Social History     Socioeconomic History    Marital status:      Spouse name: Not on file    Number of children: Not on file    Years of education: Not on file    Highest education level: Not on file   Occupational History    Not on file   Tobacco Use    Smoking status: Never    Smokeless tobacco: Never   Vaping Use    Vaping Use: Never used   Substance and Sexual Activity    Alcohol use:  Yes     Alcohol/week: 0.0 standard drinks     Comment: occasionally    Drug use: No    Sexual activity: Yes   Other Topics Concern    Not on file   Social History Narrative    Not on file     Social Determinants of Health     Financial Resource Strain: Low Risk     Difficulty of Paying Living Expenses: Not very hard   Food Insecurity: No Food Insecurity    Worried About Running Out of Food in the Last Year: Never true    Ran Out of Food in the Last Year: Never true   Transportation Needs: Not on file   Physical Activity: Not on file   Stress: Not on file   Social Connections: Not on file   Intimate Partner Violence: Not on file   Housing Stability: Not on file       Review of Systems:  Review of Systems   Musculoskeletal:  Positive for back pain. Gastrointestinal:  Negative for bowel incontinence. Genitourinary:  Negative for bladder incontinence. Physical Exam:  Ht 5' 8\" (1.727 m)   Wt 217 lb (98.4 kg)   BMI 32.99 kg/m²     Physical Exam  Constitutional:       Appearance: Normal appearance. Pulmonary:      Effort: Pulmonary effort is normal.   Neurological:      Mental Status: He is alert. Psychiatric:         Attention and Perception: Attention and perception normal.         Mood and Affect: Mood and affect normal.       Record/Diagnostics Review:    As above, I did review the imaging    Orders:  Orders Placed This Encounter   Procedures    XR HIP LEFT (2-3 VIEWS)    ID NJX DX/THER SBST INTRLMNR LMBR/SAC W/IMG GDN       Assessment:  1. Lumbosacral spondylosis without myelopathy    2. Displacement of lumbar intervertebral disc without myelopathy    3. Lumbosacral neuritis    4. Chronic bilateral thoracic back pain    5. Left hip pain        Treatment Plan:  DISCUSSION: Treatment options discussed with patient and all questions answered to patient's satisfaction. OARRS Review: Reviewed and acceptable for medications prescribed. TREATMENT OPTIONS:     Discussed different treatment options including continued conservative care such as physical therapy, chiropractic care, acupuncture. Discussed different interventional options such as epidural steroids or medial branch blocks. Also discussed surgical evaluation. Wishes to consider injections. Pain in the low back and legs continues despite conservative measures, may benefit from epidural steroid injections, we'll try the caudal approach. X-ray thoracic spine as well. Some pain radiating to left groin, will also obtain x-ray of the left hip. María Elena Villa M.D.         I have reviewed the chief complaint and history of present illness (including ROS and PFSH) and vital documentation by my staff and I agree with their documentation and have added where applicable.

## 2023-03-04 ENCOUNTER — HOSPITAL ENCOUNTER (OUTPATIENT)
Age: 43
End: 2023-03-04
Payer: COMMERCIAL

## 2023-03-04 ENCOUNTER — HOSPITAL ENCOUNTER (OUTPATIENT)
Dept: GENERAL RADIOLOGY | Age: 43
End: 2023-03-04
Payer: COMMERCIAL

## 2023-03-04 DIAGNOSIS — M25.552 LEFT HIP PAIN: ICD-10-CM

## 2023-03-04 DIAGNOSIS — G89.29 CHRONIC BILATERAL THORACIC BACK PAIN: ICD-10-CM

## 2023-03-04 DIAGNOSIS — M54.6 CHRONIC BILATERAL THORACIC BACK PAIN: ICD-10-CM

## 2023-03-04 PROCEDURE — 73502 X-RAY EXAM HIP UNI 2-3 VIEWS: CPT

## 2023-03-04 PROCEDURE — 72072 X-RAY EXAM THORAC SPINE 3VWS: CPT

## 2023-03-06 ENCOUNTER — OFFICE VISIT (OUTPATIENT)
Dept: NEUROSURGERY | Age: 43
End: 2023-03-06
Payer: COMMERCIAL

## 2023-03-06 ENCOUNTER — TELEPHONE (OUTPATIENT)
Dept: FAMILY MEDICINE CLINIC | Age: 43
End: 2023-03-06

## 2023-03-06 VITALS
BODY MASS INDEX: 32.99 KG/M2 | SYSTOLIC BLOOD PRESSURE: 117 MMHG | TEMPERATURE: 96 F | WEIGHT: 217 LBS | HEART RATE: 91 BPM | DIASTOLIC BLOOD PRESSURE: 82 MMHG | RESPIRATION RATE: 22 BRPM

## 2023-03-06 DIAGNOSIS — M51.16 LUMBAR DISC HERNIATION WITH RADICULOPATHY: Primary | ICD-10-CM

## 2023-03-06 PROCEDURE — 99214 OFFICE O/P EST MOD 30 MIN: CPT | Performed by: NEUROLOGICAL SURGERY

## 2023-03-06 NOTE — PROGRESS NOTES
[address]  [facility] [address] [facility] [address]  Dept: [phone]    Patient:  Jessica Ram  YOB: 1980  Date: 3/6/23    The patient is a 36 y.o. male who presents today for consult of the following problems:     Chief Complaint   Patient presents with    Follow-up    Back Pain             HPI:     Jessica Ram is a 36 y.o. male on whom neurosurgical consultation was requested by PEPE Paez CNP for management of severe left-sided leg pain that occurs in the left suprapubic region as well as the left hip region. Pain occurs in the back and radiates down the left lower extremity nondermatomal distribution with pain going down past the knee. States that the pain in the leg is worse when he is driving and seated for long periods as he is a . When he is standing for long periods he gets axial back pain but he does get pain in the left suprapubic region just above the inguinal canal denies any reducible bulge does not location or pain with coughing or Valsalva. .      History:     Past Medical History:   Diagnosis Date    GERD (gastroesophageal reflux disease)     Irritable bowel syndrome      Past Surgical History:   Procedure Laterality Date    COLONOSCOPY      COLONOSCOPY N/A 5/10/2022    COLORECTAL CANCER SCREENING, NOT HIGH RISK performed by Jose Alberto Cordova MD at [address] [facility], DIAGNOSTIC      UPPER GASTROINTESTINAL ENDOSCOPY  08/10/2009    UPPER GASTROINTESTINAL ENDOSCOPY N/A 5/10/2022    EGD BIOPSY performed by Jose Alberto Cordova MD at [address] [facility] History   Problem Relation Age of Onset    Hypertension Mother     Diabetes Father     Cancer Father         colon  death age 71     Current Outpatient Medications on File Prior to Visit   Medication Sig Dispense Refill    tiZANidine (ZANAFLEX) 4 MG tablet TAKE 1 TABLET BY MOUTH EVERY NIGHT AS NEEDED FOR STIFFNESS 30 tablet 1    dicyclomine (BENTYL) 10 MG capsule Take 1 capsule by mouth 4 times daily 120 capsule 3    omeprazole (PRILOSEC OTC) 20 MG tablet Take 1 tablet by mouth daily 30 tablet 3    naproxen (NAPROSYN) 500 MG tablet Take 1 tablet by mouth 2 times daily (with meals) 180 tablet 1    pitavastatin (LIVALO) 1 MG TABS tablet Take 1 tablet by mouth nightly 90 tablet 1    hydrocortisone 2.5 % ointment Apply topically 2 times daily. 200 g 1    triamcinolone (KENALOG) 0.1 % cream Apply topically 2 times daily, until rash gone 30 g 0    melatonin 1 MG tablet Take 1 tablet by mouth nightly 180 tablet 1    Cholecalciferol (VITAMIN D3) 25 MCG (1000 UT) TABS TAKE 1 TABLET BY MOUTH DAILY 90 tablet 3    vitamin B-12 (CYANOCOBALAMIN) 250 MCG tablet Take 1 tablet by mouth daily 90 tablet 1     No current facility-administered medications on file prior to visit. Social History     Tobacco Use    Smoking status: Never    Smokeless tobacco: Never   Vaping Use    Vaping Use: Never used   Substance Use Topics    Alcohol use: Yes     Alcohol/week: 0.0 standard drinks     Comment: occasionally    Drug use: No       No Known Allergies    Review of Systems  ROS: Back pain. Leg pain. Physical Exam:      /82   Pulse 91   Temp (!) 96 °F (35.6 °C)   Resp 22   Wt 217 lb (98.4 kg)   BMI 32.99 kg/m²   Estimated body mass index is 32.99 kg/m² as calculated from the following:    Height as of 2/27/23: 5' 8\" (1.727 m). Weight as of this encounter: 217 lb (98.4 kg). General:  Dulce Moreno is a 36y.o. year old male who appears his stated age. HEENT: Normocephalic atraumatic. Neck supple. Chest: regular rate; pulses equal. Equal chest rise and fall  Abdomen: Soft nondistended.    Ext: DP equal with good capillary refill  Neuro    Mentation  Appropriate affect   oriented    Cranial Nerves:   Pupils equal and reactive to light  Extraocular motion intact  Face symmetric  No dysarthria  v1-3 sensation symmetric, masseter tone symmetric  Hearing symmetric and intact to finger rub    Sensation:   Intact    Motor  L deltoid 5/5; R deltoid 5/5  L biceps 5/5; R biceps 5/5  L triceps 5/5; R triceps 5/5  L wrist extension 5/5; R wrist extension 5/5  L intrinsics 5/5; R intrinsics 5/5     L iliopsoas 5/5 , R iliopsoas 5/5  L quadriceps 5/5; R quadriceps 5/5  L Dorsiflexion 5/5; R dorsiflexion 5/5  L Plantarflexion 5/5; R plantarflexion 5/5  L EHL 5/5; R EHL 5/5    Reflexes  L Brachioradialis 2+/4; R brachioradialis 2+/4  L Biceps 2+/4; R Biceps 2+/4  L Triceps 2+/4; R Triceps 2+/4  L Patellar 2+/4: R Patellar 2+/4  L Achilles 2+/4; R Achilles 2+/4    hoffmans L: neg  hoffmans R: neg  Clonus L: neg  Clonus R: neg  Babinski L: up  Babinski R; up    I did inspect the suprapubic region as well as left inguinal region and did not note any reducible masses with Valsalva. Studies Review:     MRI lumbar spine L4-5 degenerative disease and broad-based protrusion left greater than right side. Assessment and Plan:      1. Lumbar disc herniation with radiculopathy          Plan: Patient with significant proved in terms of radiculopathy and axial pain status post physical therapy and continued home exercise regimen. Scheduled to have a injection later on this month. I do not believe that the suprapubic pain on the left side is related to anything related from his axial spine. He does not have any reducible masses in that location. Followup: No follow-ups on file. Prescriptions Ordered:  No orders of the defined types were placed in this encounter. Orders Placed:  No orders of the defined types were placed in this encounter. Electronically signed by Vick Carey DO on 3/6/2023 at 3:28 PM    Please note that this chart was generated using voice recognition Dragon dictation software.   Although every effort was made to ensure the accuracy of this automated transcription, some errors in transcription may have occurred.

## 2023-03-06 NOTE — TELEPHONE ENCOUNTER
----- Message from Renae Long DO sent at 3/6/2023  3:29 PM EST -----  Hello - pt doing better as far as radiculopathy. His L suprapubic I cannot explain and he doesn't have any reducible masses. He does have a left inguinal rash which Im not sure is related however. Awaiting caudal JEAN MARIE from dr bacon.      thanks

## 2023-03-06 NOTE — TELEPHONE ENCOUNTER
Please check with patient if he wants an earlier appointment for left lower quadrant pain and rash      Future Appointments   Date Time Provider Ruddy Salazari   3/30/2023 10:15 AM Merrilee Ormond, MD [facility]   4/13/2023  3:20 PM PEPE Aguilar - [facility] Collie Bender   5/10/2023  3:30 PM Mando Sanford MD [facility]   5/30/2023  3:00 PM [address] [place] [address], APRN - CNP [facility] Collie Sumner

## 2023-03-08 NOTE — TELEPHONE ENCOUNTER
Noted  Future Appointments   Date Time Provider Ruddy Chiquis   3/30/2023 10:15 AM Bailey Pepper MD [facility]   4/13/2023  3:20 PM PEPE Bowles [facility]   5/10/2023  3:30 PM Rosana Sanches MD fp sc [id number]   5/30/2023  3:00 PM [address], APRN - CNP [facility]

## 2023-03-30 ENCOUNTER — HOSPITAL ENCOUNTER (OUTPATIENT)
Dept: PAIN MANAGEMENT | Facility: CLINIC | Age: 43
Discharge: HOME | End: 2023-03-30

## 2023-03-30 NOTE — DISCHARGE INSTRUCTIONS

## 2023-05-10 ENCOUNTER — OFFICE VISIT (OUTPATIENT)
Dept: FAMILY MEDICINE CLINIC | Age: 43
End: 2023-05-10
Payer: COMMERCIAL

## 2023-05-10 VITALS
SYSTOLIC BLOOD PRESSURE: 124 MMHG | OXYGEN SATURATION: 97 % | HEART RATE: 81 BPM | TEMPERATURE: 98.3 F | DIASTOLIC BLOOD PRESSURE: 82 MMHG | BODY MASS INDEX: 30.87 KG/M2 | HEIGHT: 69 IN | WEIGHT: 208.4 LBS

## 2023-05-10 DIAGNOSIS — Z80.42 FAMILY HISTORY OF PROSTATE CANCER: ICD-10-CM

## 2023-05-10 DIAGNOSIS — R53.82 CHRONIC FATIGUE: ICD-10-CM

## 2023-05-10 DIAGNOSIS — M54.42 CHRONIC MIDLINE LOW BACK PAIN WITH LEFT-SIDED SCIATICA: ICD-10-CM

## 2023-05-10 DIAGNOSIS — H61.23 BILATERAL IMPACTED CERUMEN: ICD-10-CM

## 2023-05-10 DIAGNOSIS — L30.4 INTERTRIGO: ICD-10-CM

## 2023-05-10 DIAGNOSIS — L43.9: ICD-10-CM

## 2023-05-10 DIAGNOSIS — Z00.01 ENCOUNTER FOR WELL ADULT EXAM WITH ABNORMAL FINDINGS: Primary | ICD-10-CM

## 2023-05-10 DIAGNOSIS — G89.29 CHRONIC MIDLINE LOW BACK PAIN WITH LEFT-SIDED SCIATICA: ICD-10-CM

## 2023-05-10 DIAGNOSIS — E78.2 MIXED HYPERLIPIDEMIA: ICD-10-CM

## 2023-05-10 DIAGNOSIS — Z12.5 PROSTATE CANCER SCREENING: ICD-10-CM

## 2023-05-10 DIAGNOSIS — E55.9 VITAMIN D DEFICIENCY: ICD-10-CM

## 2023-05-10 DIAGNOSIS — Z80.0 FAMILY HISTORY OF COLON CANCER IN FATHER: ICD-10-CM

## 2023-05-10 PROBLEM — M51.379 DDD (DEGENERATIVE DISC DISEASE), LUMBOSACRAL: Status: ACTIVE | Noted: 2023-05-10

## 2023-05-10 PROBLEM — M51.37 DDD (DEGENERATIVE DISC DISEASE), LUMBOSACRAL: Status: ACTIVE | Noted: 2023-05-10

## 2023-05-10 PROBLEM — F51.02 ADJUSTMENT INSOMNIA: Status: RESOLVED | Noted: 2020-02-06 | Resolved: 2023-05-10

## 2023-05-10 PROBLEM — K59.00 CONSTIPATION: Status: RESOLVED | Noted: 2018-07-16 | Resolved: 2023-05-10

## 2023-05-10 PROCEDURE — 99396 PREV VISIT EST AGE 40-64: CPT | Performed by: FAMILY MEDICINE

## 2023-05-10 PROCEDURE — 99214 OFFICE O/P EST MOD 30 MIN: CPT | Performed by: FAMILY MEDICINE

## 2023-05-10 RX ORDER — TIZANIDINE 4 MG/1
4 TABLET ORAL
Qty: 90 TABLET | Refills: 1 | Status: SHIPPED | OUTPATIENT
Start: 2023-05-10

## 2023-05-10 RX ORDER — NAPROXEN 500 MG/1
500 TABLET ORAL 2 TIMES DAILY PRN
Qty: 180 TABLET | Refills: 0 | Status: SHIPPED | OUTPATIENT
Start: 2023-05-10

## 2023-05-10 RX ORDER — NYSTATIN 100000 [USP'U]/G
POWDER TOPICAL
Qty: 60 G | Refills: 2 | Status: SHIPPED | OUTPATIENT
Start: 2023-05-10

## 2023-05-10 SDOH — ECONOMIC STABILITY: FOOD INSECURITY: WITHIN THE PAST 12 MONTHS, THE FOOD YOU BOUGHT JUST DIDN'T LAST AND YOU DIDN'T HAVE MONEY TO GET MORE.: NEVER TRUE

## 2023-05-10 SDOH — ECONOMIC STABILITY: HOUSING INSECURITY
IN THE LAST 12 MONTHS, WAS THERE A TIME WHEN YOU DID NOT HAVE A STEADY PLACE TO SLEEP OR SLEPT IN A SHELTER (INCLUDING NOW)?: NO

## 2023-05-10 SDOH — ECONOMIC STABILITY: INCOME INSECURITY: HOW HARD IS IT FOR YOU TO PAY FOR THE VERY BASICS LIKE FOOD, HOUSING, MEDICAL CARE, AND HEATING?: NOT HARD AT ALL

## 2023-05-10 SDOH — ECONOMIC STABILITY: FOOD INSECURITY: WITHIN THE PAST 12 MONTHS, YOU WORRIED THAT YOUR FOOD WOULD RUN OUT BEFORE YOU GOT MONEY TO BUY MORE.: NEVER TRUE

## 2023-05-10 ASSESSMENT — ENCOUNTER SYMPTOMS
VOMITING: 0
ABDOMINAL PAIN: 0
CHEST TIGHTNESS: 0
CONSTIPATION: 0
BACK PAIN: 1
WHEEZING: 0
COUGH: 0
ABDOMINAL DISTENTION: 0
NAUSEA: 0
DIARRHEA: 0
SHORTNESS OF BREATH: 0

## 2023-05-10 ASSESSMENT — PATIENT HEALTH QUESTIONNAIRE - PHQ9
SUM OF ALL RESPONSES TO PHQ9 QUESTIONS 1 & 2: 0
2. FEELING DOWN, DEPRESSED OR HOPELESS: 0
1. LITTLE INTEREST OR PLEASURE IN DOING THINGS: 0
SUM OF ALL RESPONSES TO PHQ QUESTIONS 1-9: 0

## 2023-05-10 NOTE — PROGRESS NOTES
Visit Information    Have you changed or started any medications since your last visit including any over-the-counter medicines, vitamins, or herbal medicines? yes -    Have you stopped taking any of your medications? Is so, why? -  yes -   Are you having any side effects from any of your medications? - no    Have you seen any other physician or provider since your last visit? yes -    Have you had any other diagnostic tests since your last visit? yes -    Have you been seen in the emergency roomand/or had an admission in a hospital since we last saw you?  no   Have you had your routine dental cleaning in the past 6 months?  yes -      Do you have an active MyChart account? If no, what is the barrier?   Yes    Patient Care Team:  Jeannie Cates MD as PCP - General (Family Medicine)  Catherine James MD as Consulting Physician (Gastroenterology)    Medical History Review  Past Medical, Family, and Social History reviewed and does contribute to the patient presenting condition    Health Maintenance   Topic Date Due    COVID-19 Vaccine (1) Never done    Lipids  04/14/2023    Flu vaccine (Season Ended) 08/01/2023    Depression Screen  02/27/2024    DTaP/Tdap/Td vaccine (3 - Td or Tdap) 12/28/2030    Hepatitis C screen  Completed    HIV screen  Completed    Hepatitis A vaccine  Aged Out    Hib vaccine  Aged Out    Meningococcal (ACWY) vaccine  Aged Out    Pneumococcal 0-64 years Vaccine  Aged Out    Diabetes screen  Discontinued
Refill:  0    carbamide peroxide (DEBROX) 6.5 % otic solution     Sig: Place 5 drops into the right ear 2 times daily for 5 days     Dispense:  15 mL     Refill:  0         Medications Discontinued During This Encounter   Medication Reason    omeprazole (PRILOSEC OTC) 20 MG tablet Patient Choice    pitavastatin (LIVALO) 1 MG TABS tablet Patient Choice    hydrocortisone 2.5 % ointment Therapy completed    triamcinolone (KENALOG) 0.1 % cream Therapy completed    naproxen (NAPROSYN) 500 MG tablet REORDER    tiZANidine (ZANAFLEX) 4 MG tablet REORDER         Orders Placed This Encounter   Procedures    PSA Screening     Standing Status:   Future     Standing Expiration Date:   5/10/2024    CBC     Standing Status:   Future     Standing Expiration Date:   5/10/2024    Comprehensive Metabolic Panel     Standing Status:   Future     Standing Expiration Date:   5/10/2024    Lipid Panel     Standing Status:   Future     Standing Expiration Date:   5/10/2024     Order Specific Question:   Is Patient Fasting?/# of Hours     Answer:   8-10 Hours, water ok to drink    TSH     Standing Status:   Future     Standing Expiration Date:   5/10/2024    Vitamin D 25 Hydroxy     Standing Status:   Future     Standing Expiration Date:   5/10/2024    Rachel Parker MD, Urology, [place]     Referral Priority:   Routine     Referral Type:   Eval and Treat     Referral Reason:   Specialty Services Required     Referred to Provider:   Riley Joshua MD     Requested Specialty:   Urology     Number of Visits Requested:   1    [facility] OFFICE/OUTPATIENT ESTABLISHED MOD MDM 30-39 MIN         This note was completed by using the assistance of a speech-recognition program. However, inadvertent computerized transcription errors may be present. Although every effort was made to ensure accuracy, no guarantees can be provided that every mistake has been identified and corrected by editing.       An electronic signature was used to authenticate this

## 2023-05-11 ENCOUNTER — HOSPITAL ENCOUNTER (OUTPATIENT)
Age: 43
Discharge: HOME | End: 2023-05-11
Payer: COMMERCIAL

## 2023-05-11 ENCOUNTER — HOSPITAL ENCOUNTER (OUTPATIENT)
Dept: PAIN MANAGEMENT | Facility: CLINIC | Age: 43
Discharge: HOME | End: 2023-05-11

## 2023-05-11 DIAGNOSIS — E78.2 MIXED HYPERLIPIDEMIA: ICD-10-CM

## 2023-05-11 DIAGNOSIS — Z80.42 FAMILY HISTORY OF PROSTATE CANCER: ICD-10-CM

## 2023-05-11 DIAGNOSIS — R53.82 CHRONIC FATIGUE: ICD-10-CM

## 2023-05-11 DIAGNOSIS — E55.9 VITAMIN D DEFICIENCY: ICD-10-CM

## 2023-05-11 DIAGNOSIS — Z12.5 PROSTATE CANCER SCREENING: ICD-10-CM

## 2023-05-11 LAB
25(OH)D3 SERPL-MCNC: 24.1 NG/ML
ALBUMIN SERPL-MCNC: 4.3 G/DL (ref 3.5–5.2)
ALP SERPL-CCNC: 70 U/L (ref 40–129)
ALT SERPL-CCNC: 26 U/L (ref 5–41)
ANION GAP SERPL CALCULATED.3IONS-SCNC: 8 MMOL/L (ref 9–17)
AST SERPL-CCNC: 21 U/L
BILIRUB SERPL-MCNC: 0.3 MG/DL (ref 0.3–1.2)
BUN SERPL-MCNC: 17 MG/DL (ref 6–20)
CALCIUM SERPL-MCNC: 8.9 MG/DL (ref 8.6–10.4)
CHLORIDE SERPL-SCNC: 104 MMOL/L (ref 98–107)
CHOLEST SERPL-MCNC: 184 MG/DL
CHOLESTEROL/HDL RATIO: 4.4
CO2 SERPL-SCNC: 27 MMOL/L (ref 20–31)
CREAT SERPL-MCNC: 0.76 MG/DL (ref 0.7–1.2)
GFR SERPL CREATININE-BSD FRML MDRD: >60 ML/MIN/1.73M2
GLUCOSE SERPL-MCNC: 110 MG/DL (ref 70–99)
HCT VFR BLD AUTO: 43.1 % (ref 41–53)
HDLC SERPL-MCNC: 42 MG/DL
HGB BLD-MCNC: 14.9 G/DL (ref 13.5–17.5)
LDLC SERPL CALC-MCNC: 126 MG/DL (ref 0–130)
MCH RBC QN AUTO: 30.5 PG (ref 26–34)
MCHC RBC AUTO-ENTMCNC: 34.6 G/DL (ref 31–37)
MCV RBC AUTO: 88.1 FL (ref 80–100)
PDW BLD-RTO: 13.3 % (ref 11.5–14.9)
PLATELET # BLD AUTO: 290 K/UL (ref 150–450)
PMV BLD AUTO: 7.8 FL (ref 6–12)
POTASSIUM SERPL-SCNC: 4.4 MMOL/L (ref 3.7–5.3)
PROSTATE SPECIFIC ANTIGEN: 1.21 NG/ML
PROT SERPL-MCNC: 7.1 G/DL (ref 6.4–8.3)
RBC # BLD: 4.89 M/UL (ref 4.5–5.9)
SODIUM SERPL-SCNC: 139 MMOL/L (ref 135–144)
TRIGL SERPL-MCNC: 80 MG/DL
TSH SERPL-ACNC: 0.66 UIU/ML (ref 0.3–5)
WBC # BLD AUTO: 5.8 K/UL (ref 3.5–11)

## 2023-05-11 PROCEDURE — 85027 COMPLETE CBC AUTOMATED: CPT

## 2023-05-11 PROCEDURE — 80053 COMPREHEN METABOLIC PANEL: CPT

## 2023-05-11 PROCEDURE — 80061 LIPID PANEL: CPT

## 2023-05-11 PROCEDURE — 84443 ASSAY THYROID STIM HORMONE: CPT

## 2023-05-11 PROCEDURE — 36415 COLL VENOUS BLD VENIPUNCTURE: CPT

## 2023-05-11 PROCEDURE — 82306 VITAMIN D 25 HYDROXY: CPT

## 2023-05-11 PROCEDURE — G0103 PSA SCREENING: HCPCS

## 2023-05-11 NOTE — DISCHARGE INSTRUCTIONS

## 2023-05-11 NOTE — RESULT ENCOUNTER NOTE
Please notify patient: Very low vitamin D I will send high-dose vitamin D to the pharmacy, to take weekly with food for 3 months  Blood glucose mildly increased low-carb diet is advisable  Cholesterol is very slightly improved, there was atherosclerosis of the blood vessels in his abdomen on prior x-rays, I strongly suggest him to start a small dose of statin, I can send to the pharmacy a small dosage of Crestor 5 mg, which can prevent him from getting strokes and heart attacks and clogging of the blood vessels  Otherwise labs within normal limits  continue current treatment    Future Appointments  5/26/2023  3:20 PM    [facility] [address]  5/30/2023  3:00 PM    PEPE Ladd CNP  [facility]           [facility]  11/7/2023  4:00 PM    [facility] Knapp [facility]     [facility]  5/22/2024  4:00 PM    Ivy Knapp MD     [facility]

## 2023-05-12 DIAGNOSIS — E78.2 MIXED HYPERLIPIDEMIA: ICD-10-CM

## 2023-05-12 DIAGNOSIS — E55.9 VITAMIN D DEFICIENCY: Primary | ICD-10-CM

## 2023-05-12 RX ORDER — ROSUVASTATIN CALCIUM 5 MG/1
5 TABLET, COATED ORAL
Qty: 90 TABLET | Refills: 3 | Status: SHIPPED | OUTPATIENT
Start: 2023-05-12

## 2023-05-12 RX ORDER — ERGOCALCIFEROL 1.25 MG/1
50000 CAPSULE ORAL WEEKLY
Qty: 12 CAPSULE | Refills: 0 | Status: SHIPPED | OUTPATIENT
Start: 2023-05-12

## 2023-05-12 NOTE — PROGRESS NOTES
Diagnosis Orders   1. Vitamin D deficiency  vitamin D (ERGOCALCIFEROL) 1.25 MG (04320 UT) CAPS capsule      2.  Mixed hyperlipidemia  rosuvastatin (CRESTOR) 5 MG tablet           Future Appointments   Date Time Provider Ruddy Salazari   5/26/2023  3:20 PM PEPE Plaza - CNP [email or web address]   5/30/2023  3:00 PM PEPE Bowers - CNP [email or web address]   11/7/2023  4:00 PM Lili Guy MD [facility]   5/22/2024  4:00 PM Lili Guy MD fp sc [facility]

## 2023-05-12 NOTE — RESULT ENCOUNTER NOTE
Noted I sent both vitamin D and Crestor to the pharmacy  Future Appointments  5/26/2023  3:20 PM    Leanne Arroyo, [address] [place] [address]  5/30/2023  3:00 PM    PE[facility] Silva - FRANCIS  [facility]  11/7/2023  4:00 PM    Pastor Pena MD     [facility]  5/22/2024  4:00 PM    Pastor Pena MD     fp [facility]               Lokesh Horowitz

## 2023-05-26 ENCOUNTER — OFFICE VISIT (OUTPATIENT)
Dept: PAIN MANAGEMENT | Age: 43
End: 2023-05-26
Payer: COMMERCIAL

## 2023-05-26 VITALS — HEIGHT: 69 IN | BODY MASS INDEX: 30.81 KG/M2 | WEIGHT: 208 LBS

## 2023-05-26 DIAGNOSIS — G89.29 CHRONIC MIDLINE LOW BACK PAIN WITH LEFT-SIDED SCIATICA: ICD-10-CM

## 2023-05-26 DIAGNOSIS — M51.36 DDD (DEGENERATIVE DISC DISEASE), LUMBAR: ICD-10-CM

## 2023-05-26 DIAGNOSIS — M54.42 CHRONIC MIDLINE LOW BACK PAIN WITH LEFT-SIDED SCIATICA: ICD-10-CM

## 2023-05-26 DIAGNOSIS — M47.817 LUMBOSACRAL SPONDYLOSIS WITHOUT MYELOPATHY: Primary | ICD-10-CM

## 2023-05-26 PROCEDURE — 99213 OFFICE O/P EST LOW 20 MIN: CPT | Performed by: NURSE PRACTITIONER

## 2023-05-26 ASSESSMENT — ENCOUNTER SYMPTOMS
BOWEL INCONTINENCE: 0
COUGH: 0
BACK PAIN: 1
CONSTIPATION: 0
SHORTNESS OF BREATH: 0

## 2023-05-26 NOTE — PROGRESS NOTES
Chief Complaint   Patient presents with    Back Pain         PMH  Pt c/o lumbar pain for over a year, no known injury or surgery to the area. MRI lumbar spine with L4-5 disc herniation. He has seen a surgeon with nothing surgical planned. He has been doing physical therapy/HEP with minimal benefit. Pt was scheudled for caudal epidural steroid injection 3/2023 but missed appt and was rescheduled for 5/11/23 but this was canceled due to pt developing inguinal interigo and lichen planus of penis. He has an appt with urology on 6/19 to assess. Back Pain  This is a chronic problem. The current episode started more than 1 year ago. The problem occurs intermittently. The problem is unchanged. The pain is present in the lumbar spine. The quality of the pain is described as burning and aching. The pain radiates to the left foot. The pain is at a severity of 4/10. The pain is mild. The pain is Worse during the day. The symptoms are aggravated by bending, position, twisting, sitting, standing and stress. Stiffness is present All day. Pertinent negatives include no bladder incontinence, bowel incontinence, chest pain or fever. He has tried bed rest, home exercises, muscle relaxant, walking, chiropractic manipulation, heat and ice for the symptoms. The treatment provided mild relief. Patient denies any new neurological symptoms. No bowel or bladder incontinence, no weakness, and no falling.     Past Medical History:   Diagnosis Date    DDD (degenerative disc disease), lumbosacral 5/10/2023    GERD (gastroesophageal reflux disease)     Irritable bowel syndrome        Past Surgical History:   Procedure Laterality Date    COLONOSCOPY      COLONOSCOPY N/A 5/10/2022    COLORECTAL CANCER SCREENING, NOT HIGH RISK performed by Cy Porras MD at [facility], DIAGNOSTIC      UPPER GASTROINTESTINAL ENDOSCOPY  08/10/2009    UPPER GASTROINTESTINAL ENDOSCOPY N/A 5/10/2022    EGD BIOPSY

## 2023-06-19 ENCOUNTER — OFFICE VISIT (OUTPATIENT)
Dept: UROLOGY | Age: 43
End: 2023-06-19
Payer: COMMERCIAL

## 2023-06-19 VITALS
HEIGHT: 69 IN | TEMPERATURE: 97.8 F | HEART RATE: 81 BPM | SYSTOLIC BLOOD PRESSURE: 125 MMHG | DIASTOLIC BLOOD PRESSURE: 81 MMHG | WEIGHT: 217 LBS | BODY MASS INDEX: 32.14 KG/M2

## 2023-06-19 DIAGNOSIS — R39.9 UTI SYMPTOMS: Primary | ICD-10-CM

## 2023-06-19 DIAGNOSIS — N47.7 POSTHITIS: ICD-10-CM

## 2023-06-19 LAB
BILIRUBIN, POC: NORMAL
BLOOD URINE, POC: NEGATIVE
CLARITY, POC: NORMAL
COLOR, POC: YELLOW
GLUCOSE URINE, POC: NEGATIVE
KETONES, POC: NORMAL
LEUKOCYTE EST, POC: NEGATIVE
NITRITE, POC: NEGATIVE
PH, POC: NORMAL
PROTEIN, POC: NEGATIVE
SPECIFIC GRAVITY, POC: NORMAL
UROBILINOGEN, POC: NORMAL

## 2023-06-19 PROCEDURE — 99204 OFFICE O/P NEW MOD 45 MIN: CPT | Performed by: UROLOGY

## 2023-06-19 PROCEDURE — 81002 URINALYSIS NONAUTO W/O SCOPE: CPT | Performed by: UROLOGY

## 2023-06-19 RX ORDER — BETAMETHASONE DIPROPIONATE 0.05 %
OINTMENT (GRAM) TOPICAL
Qty: 30 G | Refills: 0 | Status: SHIPPED | OUTPATIENT
Start: 2023-06-19

## 2023-06-19 ASSESSMENT — ENCOUNTER SYMPTOMS
CONSTIPATION: 0
ABDOMINAL PAIN: 0
EYE REDNESS: 0
SHORTNESS OF BREATH: 0
WHEEZING: 0
EYE PAIN: 0
DIARRHEA: 0
BACK PAIN: 0
NAUSEA: 0
COUGH: 0
VOMITING: 0

## 2023-06-19 NOTE — PROGRESS NOTES
Review of Systems   Constitutional:  Negative for chills, fatigue and fever. Eyes:  Negative for pain, redness and visual disturbance. Respiratory:  Negative for cough, shortness of breath and wheezing. Cardiovascular:  Negative for chest pain and leg swelling. Gastrointestinal:  Negative for abdominal pain, constipation, diarrhea, nausea and vomiting. Genitourinary:  Negative for difficulty urinating, dysuria, flank pain, frequency, hematuria, scrotal swelling, testicular pain and urgency. Musculoskeletal:  Negative for back pain, joint swelling and myalgias. Skin:  Negative for rash and wound. Neurological:  Negative for dizziness, tremors, weakness and numbness. Hematological:  Does not bruise/bleed easily.
Exam  Constitutional: Patient in no acute distress. Neuro: Alert and oriented to person, place and time. Psych: Mood normal, affect normal  Skin: No rash noted  HEENT: Head: Normocephalic and atraumatic  Conjunctivae and EOM are normal. Pupils are equal, round  Nose: Normal  Right External Ear: Normal; Left External Ear: Normal  Mouth: Mucosa Moist  Neck: Supple  Lungs:Respiratory effort is normal  Cardiovascular: Warm & Pink  Abdomen: Soft, non-tender, non-distendedwith no CVA,  No flank tenderness,  Orhepatosplenomegaly   Lymphatics: No palpable lymphadenopathy. Bladder non-tender and not distended. Musculoskeletal: Normal gait and station  Penis uncirced; pt has fissure on shaft skin near glans  Urethral meatus normal  Scrotal exam normal  Testicles normal bilaterally  Epididymis normal bilaterally  No evidence of inguinal hernia  Normal rectal tone with no masses        Assessment and Plan      1. UTI symptoms    2. Posthitis           Plan:  Betamethasone  F/u 6 weeks  No indication for circ unless this worsens  Advised pt to use lubricant with intercourse       Prescriptions Ordered:  Orders Placed This Encounter   Medications    betamethasone dipropionate 0.05 % ointment     Sig: Apply topically daily on foreskin x 1 week     Dispense:  30 g     Refill:  0      Orders Placed:  Orders Placed This Encounter   Procedures    POCT Urinalysis no Micro             Addy Worthington MD    Agree with the ROS entered by the MA.

## 2024-05-21 ASSESSMENT — PATIENT HEALTH QUESTIONNAIRE - PHQ9
SUM OF ALL RESPONSES TO PHQ9 QUESTIONS 1 & 2: 0
1. LITTLE INTEREST OR PLEASURE IN DOING THINGS: NOT AT ALL
SUM OF ALL RESPONSES TO PHQ QUESTIONS 1-9: 0
SUM OF ALL RESPONSES TO PHQ9 QUESTIONS 1 & 2: 0
2. FEELING DOWN, DEPRESSED OR HOPELESS: NOT AT ALL
SUM OF ALL RESPONSES TO PHQ QUESTIONS 1-9: 0
1. LITTLE INTEREST OR PLEASURE IN DOING THINGS: NOT AT ALL
2. FEELING DOWN, DEPRESSED OR HOPELESS: NOT AT ALL
SUM OF ALL RESPONSES TO PHQ QUESTIONS 1-9: 0
SUM OF ALL RESPONSES TO PHQ QUESTIONS 1-9: 0

## 2024-05-22 ENCOUNTER — OFFICE VISIT (OUTPATIENT)
Dept: FAMILY MEDICINE CLINIC | Age: 44
End: 2024-05-22
Payer: COMMERCIAL

## 2024-05-22 VITALS
WEIGHT: 215 LBS | BODY MASS INDEX: 30.78 KG/M2 | DIASTOLIC BLOOD PRESSURE: 70 MMHG | OXYGEN SATURATION: 99 % | HEART RATE: 82 BPM | TEMPERATURE: 97.3 F | HEIGHT: 70 IN | SYSTOLIC BLOOD PRESSURE: 108 MMHG

## 2024-05-22 DIAGNOSIS — G89.29 CHRONIC MIDLINE LOW BACK PAIN WITH LEFT-SIDED SCIATICA: ICD-10-CM

## 2024-05-22 DIAGNOSIS — Z80.0 FAMILY HISTORY OF COLON CANCER IN FATHER: ICD-10-CM

## 2024-05-22 DIAGNOSIS — E55.9 VITAMIN D DEFICIENCY: ICD-10-CM

## 2024-05-22 DIAGNOSIS — Z00.01 ENCOUNTER FOR WELL ADULT EXAM WITH ABNORMAL FINDINGS: Primary | ICD-10-CM

## 2024-05-22 DIAGNOSIS — M54.42 CHRONIC MIDLINE LOW BACK PAIN WITH LEFT-SIDED SCIATICA: ICD-10-CM

## 2024-05-22 DIAGNOSIS — E78.2 MIXED HYPERLIPIDEMIA: ICD-10-CM

## 2024-05-22 DIAGNOSIS — Z11.59 ENCOUNTER FOR SCREENING FOR OTHER VIRAL DISEASES: ICD-10-CM

## 2024-05-22 DIAGNOSIS — E53.8 VITAMIN B 12 DEFICIENCY: ICD-10-CM

## 2024-05-22 DIAGNOSIS — R73.9 HYPERGLYCEMIA: ICD-10-CM

## 2024-05-22 PROBLEM — L30.4 INTERTRIGO: Status: RESOLVED | Noted: 2023-05-10 | Resolved: 2024-05-22

## 2024-05-22 PROBLEM — M54.2 CERVICAL PAIN: Status: RESOLVED | Noted: 2022-11-11 | Resolved: 2024-05-22

## 2024-05-22 PROBLEM — L30.9 DERMATITIS: Status: RESOLVED | Noted: 2022-04-12 | Resolved: 2024-05-22

## 2024-05-22 PROBLEM — R53.83 FATIGUE: Status: RESOLVED | Noted: 2020-02-06 | Resolved: 2024-05-22

## 2024-05-22 PROBLEM — B37.2 YEAST DERMATITIS: Status: RESOLVED | Noted: 2019-01-21 | Resolved: 2024-05-22

## 2024-05-22 PROCEDURE — 99396 PREV VISIT EST AGE 40-64: CPT | Performed by: FAMILY MEDICINE

## 2024-05-22 PROCEDURE — 99214 OFFICE O/P EST MOD 30 MIN: CPT | Performed by: FAMILY MEDICINE

## 2024-05-22 RX ORDER — MELATONIN
1 DAILY
Qty: 90 TABLET | Refills: 3 | Status: CANCELLED | OUTPATIENT
Start: 2024-05-22

## 2024-05-22 RX ORDER — NAPROXEN 500 MG/1
500 TABLET ORAL 2 TIMES DAILY PRN
Qty: 180 TABLET | Refills: 0 | Status: SHIPPED | OUTPATIENT
Start: 2024-05-22

## 2024-05-22 SDOH — ECONOMIC STABILITY: FOOD INSECURITY: WITHIN THE PAST 12 MONTHS, THE FOOD YOU BOUGHT JUST DIDN'T LAST AND YOU DIDN'T HAVE MONEY TO GET MORE.: NEVER TRUE

## 2024-05-22 SDOH — ECONOMIC STABILITY: FOOD INSECURITY: WITHIN THE PAST 12 MONTHS, YOU WORRIED THAT YOUR FOOD WOULD RUN OUT BEFORE YOU GOT MONEY TO BUY MORE.: NEVER TRUE

## 2024-05-22 SDOH — ECONOMIC STABILITY: INCOME INSECURITY: HOW HARD IS IT FOR YOU TO PAY FOR THE VERY BASICS LIKE FOOD, HOUSING, MEDICAL CARE, AND HEATING?: NOT HARD AT ALL

## 2024-05-22 ASSESSMENT — ENCOUNTER SYMPTOMS
VOMITING: 0
COUGH: 0
CONSTIPATION: 0
DIARRHEA: 0
NAUSEA: 0
WHEEZING: 0
SHORTNESS OF BREATH: 0
ABDOMINAL PAIN: 0
BACK PAIN: 1
CHEST TIGHTNESS: 0
ABDOMINAL DISTENTION: 0

## 2024-05-22 NOTE — PROGRESS NOTES
Visit Information    Have you changed or started any medications since your last visit including any over-the-counter medicines, vitamins, or herbal medicines? no   Have you stopped taking any of your medications? Is so, why? -  no  Are you having any side effects from any of your medications? - no    Have you seen any other physician or provider since your last visit?  no   Have you had any other diagnostic tests since your last visit?  no   Have you been seen in the emergency room and/or had an admission in a hospital since we last saw you?  no   Have you had your routine dental cleaning in the past 6 months?  no     Do you have an active MyChart account? If no, what is the barrier?  Yes    Patient Care Team:  Saida Billingsley MD as PCP - General (Family Medicine)  Saida Billingsley MD as PCP - Empaneled Provider  Jude Ferrara MD as Consulting Physician (Gastroenterology)  Keiko Eid MD as Anesthesiologist (Pain Management)  Sugar Zee APRN - CNP as Nurse Practitioner (Neurological Surgery)    Medical History Review  Past Medical, Family, and Social History reviewed and does contribute to the patient presenting condition    Health Maintenance   Topic Date Due    Hepatitis B vaccine (1 of 3 - 3-dose series) Never done    COVID-19 Vaccine (1) Never done    Lipids  05/11/2024    Flu vaccine (Season Ended) 08/01/2024    Diabetes screen  04/14/2025    Depression Screen  05/21/2025    Colorectal Cancer Screen  05/10/2027    DTaP/Tdap/Td vaccine (3 - Td or Tdap) 12/28/2030    Hepatitis C screen  Completed    HIV screen  Completed    Hepatitis A vaccine  Aged Out    Hib vaccine  Aged Out    HPV vaccine  Aged Out    Polio vaccine  Aged Out    Meningococcal (ACWY) vaccine  Aged Out    Pneumococcal 0-64 years Vaccine  Aged Out              
Nerves: No cranial nerve deficit.      Motor: No abnormal muscle tone.      Coordination: Coordination normal.      Gait: Gait normal.      Deep Tendon Reflexes: Reflexes normal.   Psychiatric:         Attention and Perception: Attention normal.         Mood and Affect: Mood normal.         Speech: Speech normal.         Behavior: Behavior normal.         Thought Content: Thought content normal.         Cognition and Memory: Cognition normal.         Judgment: Judgment normal.             I personally reviewed testing with patient.  Hyperlipidemia  Hyperglycemia  Vitamin D deficiency    Otherwise labs within normal limits      Lab Results   Component Value Date    WBC 5.8 05/11/2023    HGB 14.9 05/11/2023    HCT 43.1 05/11/2023    MCV 88.1 05/11/2023     05/11/2023       Lab Results   Component Value Date/Time     05/11/2023 07:29 AM    K 4.4 05/11/2023 07:29 AM     05/11/2023 07:29 AM    CO2 27 05/11/2023 07:29 AM    BUN 17 05/11/2023 07:29 AM    CREATININE 0.76 05/11/2023 07:29 AM    GLUCOSE 110 05/11/2023 07:29 AM    CALCIUM 8.9 05/11/2023 07:29 AM      Lab Results   Component Value Date    LABA1C 5.5 04/14/2022    LABA1C 5.5 02/09/2021    LABA1C 5.3 02/08/2020       Lab Results   Component Value Date    ALT 26 05/11/2023    AST 21 05/11/2023    ALKPHOS 70 05/11/2023    BILITOT 0.3 05/11/2023       Lab Results   Component Value Date    TSH 0.66 05/11/2023       Lab Results   Component Value Date     05/11/2023       Lab Results   Component Value Date    LABA1C 5.5 04/14/2022       Lab Results   Component Value Date    FFJTGLZO79 508 07/12/2022       Lab Results   Component Value Date    FOLATE 15.5 07/12/2022       No results found for: \"IRON\", \"TIBC\", \"FERRITIN\"    Lab Results   Component Value Date    VITD25 24.1 (L) 05/11/2023         Orders Placed This Encounter   Medications    naproxen (NAPROSYN) 500 MG tablet     Sig: Take 1 tablet by mouth 2 times daily as needed for Pain (back

## 2024-05-25 ENCOUNTER — HOSPITAL ENCOUNTER (OUTPATIENT)
Age: 44
Discharge: HOME | End: 2024-05-25
Payer: COMMERCIAL

## 2024-05-25 DIAGNOSIS — R73.9 HYPERGLYCEMIA: ICD-10-CM

## 2024-05-25 DIAGNOSIS — E55.9 VITAMIN D DEFICIENCY: Primary | ICD-10-CM

## 2024-05-25 DIAGNOSIS — Z11.59 ENCOUNTER FOR SCREENING FOR OTHER VIRAL DISEASES: ICD-10-CM

## 2024-05-25 DIAGNOSIS — E78.2 MIXED HYPERLIPIDEMIA: ICD-10-CM

## 2024-05-25 DIAGNOSIS — E55.9 VITAMIN D DEFICIENCY: ICD-10-CM

## 2024-05-25 LAB
25(OH)D3 SERPL-MCNC: 25.2 NG/ML (ref 30–100)
CHOLEST SERPL-MCNC: 206 MG/DL
CHOLESTEROL/HDL RATIO: 4.5
EST. AVERAGE GLUCOSE BLD GHB EST-MCNC: 111 MG/DL
HBA1C MFR BLD: 5.5 % (ref 4–6)
HBV SURFACE AB SERPL IA-ACNC: <3.5 MIU/ML
HDLC SERPL-MCNC: 46 MG/DL
LDLC SERPL CALC-MCNC: 122 MG/DL (ref 0–130)
TRIGL SERPL-MCNC: 190 MG/DL

## 2024-05-25 PROCEDURE — 82306 VITAMIN D 25 HYDROXY: CPT

## 2024-05-25 PROCEDURE — 36415 COLL VENOUS BLD VENIPUNCTURE: CPT

## 2024-05-25 PROCEDURE — 80061 LIPID PANEL: CPT

## 2024-05-25 PROCEDURE — 86317 IMMUNOASSAY INFECTIOUS AGENT: CPT

## 2024-05-25 PROCEDURE — 83036 HEMOGLOBIN GLYCOSYLATED A1C: CPT

## 2024-05-25 RX ORDER — ERGOCALCIFEROL 1.25 MG/1
50000 CAPSULE ORAL WEEKLY
Qty: 12 CAPSULE | Refills: 0 | Status: SHIPPED | OUTPATIENT
Start: 2024-05-25

## 2025-05-27 ENCOUNTER — OFFICE VISIT (OUTPATIENT)
Dept: FAMILY MEDICINE CLINIC | Age: 45
End: 2025-05-27
Payer: COMMERCIAL

## 2025-05-27 VITALS
HEIGHT: 68 IN | BODY MASS INDEX: 32.58 KG/M2 | OXYGEN SATURATION: 98 % | SYSTOLIC BLOOD PRESSURE: 102 MMHG | TEMPERATURE: 98.7 F | DIASTOLIC BLOOD PRESSURE: 68 MMHG | WEIGHT: 215 LBS | HEART RATE: 93 BPM

## 2025-05-27 DIAGNOSIS — Z12.5 PROSTATE CANCER SCREENING: ICD-10-CM

## 2025-05-27 DIAGNOSIS — Z23 ENCOUNTER FOR IMMUNIZATION: ICD-10-CM

## 2025-05-27 DIAGNOSIS — Z80.42 FAMILY HISTORY OF PROSTATE CANCER: ICD-10-CM

## 2025-05-27 DIAGNOSIS — Z80.0 FAMILY HISTORY OF COLON CANCER IN FATHER: ICD-10-CM

## 2025-05-27 DIAGNOSIS — R53.83 FATIGUE, UNSPECIFIED TYPE: ICD-10-CM

## 2025-05-27 DIAGNOSIS — E55.9 VITAMIN D DEFICIENCY: ICD-10-CM

## 2025-05-27 DIAGNOSIS — R73.9 HYPERGLYCEMIA: ICD-10-CM

## 2025-05-27 DIAGNOSIS — E78.2 MIXED HYPERLIPIDEMIA: ICD-10-CM

## 2025-05-27 DIAGNOSIS — Z00.01 ENCOUNTER FOR WELL ADULT EXAM WITH ABNORMAL FINDINGS: Primary | ICD-10-CM

## 2025-05-27 PROCEDURE — 90746 HEPB VACCINE 3 DOSE ADULT IM: CPT | Performed by: FAMILY MEDICINE

## 2025-05-27 PROCEDURE — 99396 PREV VISIT EST AGE 40-64: CPT | Performed by: FAMILY MEDICINE

## 2025-05-27 PROCEDURE — 90471 IMMUNIZATION ADMIN: CPT | Performed by: FAMILY MEDICINE

## 2025-05-27 PROCEDURE — 99213 OFFICE O/P EST LOW 20 MIN: CPT | Performed by: FAMILY MEDICINE

## 2025-05-27 SDOH — ECONOMIC STABILITY: FOOD INSECURITY: WITHIN THE PAST 12 MONTHS, THE FOOD YOU BOUGHT JUST DIDN'T LAST AND YOU DIDN'T HAVE MONEY TO GET MORE.: NEVER TRUE

## 2025-05-27 SDOH — ECONOMIC STABILITY: FOOD INSECURITY: WITHIN THE PAST 12 MONTHS, YOU WORRIED THAT YOUR FOOD WOULD RUN OUT BEFORE YOU GOT MONEY TO BUY MORE.: NEVER TRUE

## 2025-05-27 ASSESSMENT — ENCOUNTER SYMPTOMS
CONSTIPATION: 0
ABDOMINAL DISTENTION: 0
WHEEZING: 0
DIARRHEA: 0
SHORTNESS OF BREATH: 0
BACK PAIN: 0
ABDOMINAL PAIN: 0
COUGH: 0
NAUSEA: 0
CHEST TIGHTNESS: 0
VOMITING: 0

## 2025-05-27 ASSESSMENT — PATIENT HEALTH QUESTIONNAIRE - PHQ9
SUM OF ALL RESPONSES TO PHQ QUESTIONS 1-9: 0
1. LITTLE INTEREST OR PLEASURE IN DOING THINGS: NOT AT ALL
2. FEELING DOWN, DEPRESSED OR HOPELESS: NOT AT ALL

## 2025-05-27 NOTE — PROGRESS NOTES
Well Adult Note  Name: Hakeem Kiser Today’s Date: 2025   MRN: [id number] Sex: Male   Age: 44 y.o. Ethnicity:  /    : 1980 Race:  /       Hakeem Kiser is here for a well adult exam.Annual Exam, Immunizations (NO TO ALL VACCINES DUE), Fatigue, and Hyperlipidemia             Assessment & Plan      Encounter for well adult exam with abnormal findings  Low carb, low fat diet, increase fruits and vegetables, and exercise 4-5 times a week 30-40 minutes a day, or walk 1-2 hours per day, or wear a pedometer and get at least 10,000 steps per day.  Annual eye exam advised, he is due for eye exam, advised to schedule  Dental cleaning every 6 months advised    He is up-to-date with colonoscopy  Date of last Colonoscopy: 5/10/2022  Colonoscopy is due again in   He is not up-to-date with hepatitis B vaccine and he is not immune against hepatitis B, will give the vaccine, he is agreeable  Advised COVID-19 vaccine at the pharmacy  PSA screening ordered due to family history of prostate cancer     Ear wax.He has a bit of wax in his ears. He is advised to use Q-tips after washing his hair when the wax softens up, but otherwise avoid using Q-tips.    Fatigue, unspecified type  Chronic, intermittent  He reports occasional tiredness and getting 5-7 hours of sleep per night. His vitamin D and B12 levels will be checked as part of the comprehensive blood work to rule out deficiencies contributing to fatigue.  Will do basic labs to rule out certain common medical conditions: hematologic, renal, hepatic, electrolyte imbalances, thyroid disorders.    -     ESTABLISHED, LOW MDM, 20-29 MIN [[id number]]  -     CBC; Future  -     Comprehensive Metabolic Panel; Future  -     TSH; Future  Heartburn.  He reports occasional heartburn but is doing better than before. He is advised to avoid fried foods and large meals that may trigger symptoms.  He has had an EGD 5/10/2022, pathology report showed normal

## 2025-05-27 NOTE — PROGRESS NOTES
Visit Information    Have you changed or started any medications since your last visit including any over-the-counter medicines, vitamins, or herbal medicines? yes -    Have you stopped taking any of your medications? Is so, why? -  yes -   Are you having any side effects from any of your medications? - no    Have you seen any other physician or provider since your last visit?  no   Have you had any other diagnostic tests since your last visit?  no   Have you been seen in the emergency room and/or had an admission in a hospital since we last saw you?  no   Have you had your routine dental cleaning in the past 6 months?  yes -      Do you have an active MyChart account? If no, what is the barrier?  Yes    Patient Care Team:  Saida Billingsley MD as PCP - General (Family Medicine)  Saida Billingsley MD as PCP - Empaneled Provider  Jude Ferrara MD as Consulting Physician (Gastroenterology)  Keiko Eid MD as Anesthesiologist (Pain Management)  Sugar Zee APRN - CNP as Nurse Practitioner (Neurological Surgery)    Medical History Review  Past Medical, Family, and Social History reviewed and does contribute to the patient presenting condition    Health Maintenance   Topic Date Due    Hepatitis B vaccine (1 of 3 - 19+ 3-dose series) Never done    COVID-19 Vaccine (3 - 2024-25 season) 09/01/2024    Depression Screen  05/21/2025    Flu vaccine (Season Ended) 08/01/2025    Colorectal Cancer Screen  05/10/2027    Lipids  05/25/2029    DTaP/Tdap/Td vaccine (3 - Td or Tdap) 12/28/2030    Hepatitis C screen  Completed    HIV screen  Completed    Hepatitis A vaccine  Aged Out    Hib vaccine  Aged Out    HPV vaccine  Aged Out    Polio vaccine  Aged Out    Meningococcal (ACWY) vaccine  Aged Out    Meningococcal B vaccine  Aged Out    Pneumococcal 0-49 years Vaccine  Aged Out    Diabetes screen  Discontinued

## 2025-05-27 NOTE — PATIENT INSTRUCTIONS
Neck , no lymphadenopathy
hands, brush your teeth twice a day, and wear a seat belt in the car.   Where can you learn more?  Go to https://www.[employer].net/patientEd and enter P072 to learn more about \"Well Visit, Ages 18 to 65: Care Instructions.\"  Current as of: April 30, 2024  Content Version: 14.4  © 5282-7549 [employer].   Care instructions adapted under license by [employer]. If you have questions about a medical condition or this instruction, always ask your healthcare professional. [employer] disclaims any warranty or liability for your use of this information.

## 2025-05-31 ENCOUNTER — HOSPITAL ENCOUNTER (OUTPATIENT)
Age: 45
Discharge: HOME | End: 2025-05-31
Payer: COMMERCIAL

## 2025-05-31 ENCOUNTER — RESULTS FOLLOW-UP (OUTPATIENT)
Dept: FAMILY MEDICINE CLINIC | Age: 45
End: 2025-05-31

## 2025-05-31 DIAGNOSIS — Z80.42 FAMILY HISTORY OF PROSTATE CANCER: ICD-10-CM

## 2025-05-31 DIAGNOSIS — Z12.5 PROSTATE CANCER SCREENING: ICD-10-CM

## 2025-05-31 DIAGNOSIS — R73.9 HYPERGLYCEMIA: ICD-10-CM

## 2025-05-31 DIAGNOSIS — E78.2 MIXED HYPERLIPIDEMIA: ICD-10-CM

## 2025-05-31 DIAGNOSIS — R53.83 FATIGUE, UNSPECIFIED TYPE: ICD-10-CM

## 2025-05-31 DIAGNOSIS — E78.2 MIXED HYPERLIPIDEMIA: Primary | ICD-10-CM

## 2025-05-31 DIAGNOSIS — E55.9 VITAMIN D DEFICIENCY: ICD-10-CM

## 2025-05-31 LAB
25(OH)D3 SERPL-MCNC: 28.3 NG/ML (ref 30–100)
ALBUMIN SERPL-MCNC: 4.3 G/DL (ref 3.5–5.2)
ALP SERPL-CCNC: 73 U/L (ref 40–129)
ALT SERPL-CCNC: 30 U/L (ref 10–50)
ANION GAP SERPL CALCULATED.3IONS-SCNC: 10 MMOL/L (ref 9–16)
AST SERPL-CCNC: 26 U/L (ref 10–50)
BILIRUB SERPL-MCNC: 0.5 MG/DL (ref 0–1.2)
BUN SERPL-MCNC: 14 MG/DL (ref 6–20)
CALCIUM SERPL-MCNC: 9.3 MG/DL (ref 8.6–10.4)
CHLORIDE SERPL-SCNC: 107 MMOL/L (ref 98–107)
CHOLEST SERPL-MCNC: 201 MG/DL (ref 0–199)
CHOLESTEROL/HDL RATIO: 4.1
CO2 SERPL-SCNC: 26 MMOL/L (ref 20–31)
CREAT SERPL-MCNC: 0.8 MG/DL (ref 0.7–1.2)
ERYTHROCYTE [DISTWIDTH] IN BLOOD BY AUTOMATED COUNT: 12.6 % (ref 11.5–14.9)
EST. AVERAGE GLUCOSE BLD GHB EST-MCNC: 108 MG/DL
GFR, ESTIMATED: >90 ML/MIN/1.73M2
GLUCOSE SERPL-MCNC: 95 MG/DL (ref 74–99)
HBA1C MFR BLD: 5.4 % (ref 4–6)
HCT VFR BLD AUTO: 44.7 % (ref 41–53)
HDLC SERPL-MCNC: 49 MG/DL
HGB BLD-MCNC: 15.3 G/DL (ref 13.5–17.5)
LDLC SERPL CALC-MCNC: 138 MG/DL (ref 0–100)
MCH RBC QN AUTO: 30.9 PG (ref 26–34)
MCHC RBC AUTO-ENTMCNC: 34.2 G/DL (ref 31–37)
MCV RBC AUTO: 90.3 FL (ref 80–100)
NRBC BLD-RTO: 0 PER 100 WBC
PLATELET # BLD AUTO: 343 K/UL (ref 150–450)
PMV BLD AUTO: 9.9 FL (ref 8–13.5)
POTASSIUM SERPL-SCNC: 4.1 MMOL/L (ref 3.7–5.3)
PROT SERPL-MCNC: 7.3 G/DL (ref 6.6–8.7)
PSA SERPL-MCNC: 1.28 NG/ML (ref 0–4)
RBC # BLD AUTO: 4.95 M/UL (ref 4.21–5.77)
SODIUM SERPL-SCNC: 143 MMOL/L (ref 136–145)
TRIGL SERPL-MCNC: 69 MG/DL (ref 0–149)
TSH SERPL DL<=0.05 MIU/L-ACNC: 0.78 UIU/ML (ref 0.27–4.2)
WBC OTHER # BLD: 6.6 K/UL (ref 3.5–11)

## 2025-05-31 PROCEDURE — 80061 LIPID PANEL: CPT

## 2025-05-31 PROCEDURE — 83036 HEMOGLOBIN GLYCOSYLATED A1C: CPT

## 2025-05-31 PROCEDURE — 82306 VITAMIN D 25 HYDROXY: CPT

## 2025-05-31 PROCEDURE — 80053 COMPREHEN METABOLIC PANEL: CPT

## 2025-05-31 PROCEDURE — 85027 COMPLETE CBC AUTOMATED: CPT

## 2025-05-31 PROCEDURE — 36415 COLL VENOUS BLD VENIPUNCTURE: CPT

## 2025-05-31 PROCEDURE — 84443 ASSAY THYROID STIM HORMONE: CPT

## 2025-05-31 PROCEDURE — G0103 PSA SCREENING: HCPCS

## 2025-05-31 RX ORDER — ATORVASTATIN CALCIUM 10 MG/1
10 TABLET, FILM COATED ORAL EVERY EVENING
Qty: 90 TABLET | Refills: 3 | Status: SHIPPED | OUTPATIENT
Start: 2025-05-31 | End: 2025-07-08 | Stop reason: SDUPTHER

## 2025-05-31 RX ORDER — ERGOCALCIFEROL 1.25 MG/1
50000 CAPSULE, LIQUID FILLED ORAL WEEKLY
Qty: 12 CAPSULE | Refills: 0 | Status: SHIPPED | OUTPATIENT
Start: 2025-05-31 | End: 2025-08-17

## 2025-05-31 NOTE — RESULT ENCOUNTER NOTE
Please notify patient: Very low vitamin D, I will send high dosage vitamin D to be taken weekly with food  Worsening lipids, I will send statin as we spoke at the appointment to improve his cholesterol and decrease the risk of heart attacks and strokes    Otherwise labs within normal limits  continue current treatment    Future Appointments  6/3/2026   3:30 PM    Saida Billingsley MD    fp sc               [facility] DEP

## 2025-07-08 DIAGNOSIS — E78.2 MIXED HYPERLIPIDEMIA: ICD-10-CM

## 2025-07-09 RX ORDER — ATORVASTATIN CALCIUM 10 MG/1
10 TABLET, FILM COATED ORAL EVERY EVENING
Qty: 90 TABLET | Refills: 3 | Status: SHIPPED | OUTPATIENT
Start: 2025-07-09

## 2025-07-09 NOTE — TELEPHONE ENCOUNTER
Please Approve or Refuse.  Send to Pharmacy per Pt's Request:      Next Visit Date:  Visit date not found   Last Visit Date: 5/27/2025    Hemoglobin A1C (%)   Date Value   05/31/2025 5.4   05/25/2024 5.5   04/14/2022 5.5             ( goal A1C is < 7)   BP Readings from Last 3 Encounters:   05/27/25 102/68   05/22/24 108/70   06/19/23 125/81          (goal 120/80)  BUN   Date Value Ref Range Status   05/31/2025 14 6 - 20 mg/dL Final     Creatinine   Date Value Ref Range Status   05/31/2025 0.8 0.7 - 1.2 mg/dL Final     Potassium   Date Value Ref Range Status   05/31/2025 4.1 3.7 - 5.3 mmol/L Final

## 2025-08-21 DIAGNOSIS — E55.9 VITAMIN D DEFICIENCY: ICD-10-CM

## 2025-08-22 RX ORDER — ERGOCALCIFEROL 1.25 MG/1
CAPSULE, LIQUID FILLED ORAL
Qty: 12 CAPSULE | Refills: 0 | Status: SHIPPED | OUTPATIENT
Start: 2025-08-22

## (undated) DEVICE — BASIN EMSIS 700ML GRAPHITE PLAS KID SHP GRAD

## (undated) DEVICE — Device: Brand: DEFENDO VALVE AND CONNECTOR KIT

## (undated) DEVICE — GAUZE,SPONGE,3"X3",4PLY,NS,LF: Brand: MEDLINE

## (undated) DEVICE — GOWN,POLY REINFORCED,LG: Brand: MEDLINE

## (undated) DEVICE — CONNECTOR TBNG AUX H2O JET DISP FOR OLY 160/180 SER

## (undated) DEVICE — SYRINGE MED 50ML LUERLOCK TIP

## (undated) DEVICE — BITEBLOCK 54FR W/ DENT RIM BLOX

## (undated) DEVICE — ADAPTER,CATHETER/SYRINGE/LUER,STERILE: Brand: MEDLINE

## (undated) DEVICE — DEFENDO AIR WATER SUCTION AND BIOPSY VALVE KIT FOR  OLYMPUS: Brand: DEFENDO AIR/WATER/SUCTION AND BIOPSY VALVE

## (undated) DEVICE — 4-PORT MANIFOLD: Brand: NEPTUNE 2

## (undated) DEVICE — ENDO KIT W/SYRINGE: Brand: MEDLINE INDUSTRIES, INC.

## (undated) DEVICE — FORCEPS BX L240CM JAW DIA2.8MM L CAP W/ NDL MIC MESH TOOTH

## (undated) DEVICE — FLUFF UNDERPAD,MODERATE: Brand: WINGS

## (undated) DEVICE — GLOVE ORANGE PI 7   MSG9070

## (undated) DEVICE — NEEDLE SYR 18GA L1.5IN RED PLAS HUB S STL BLNT FILL W/O

## (undated) DEVICE — TUBING, SUCTION, 3/16" X 10', STRAIGHT: Brand: MEDLINE

## (undated) DEVICE — CANNULA IV 18GA L15IN BLNT FILL LUERLOCK HUB MJCT

## (undated) DEVICE — PERRYSBURG ENDO PACK: Brand: MEDLINE INDUSTRIES, INC.